# Patient Record
Sex: MALE | Race: WHITE | Employment: OTHER | URBAN - METROPOLITAN AREA
[De-identification: names, ages, dates, MRNs, and addresses within clinical notes are randomized per-mention and may not be internally consistent; named-entity substitution may affect disease eponyms.]

---

## 2017-04-15 ENCOUNTER — HOSPITAL ENCOUNTER (EMERGENCY)
Facility: CLINIC | Age: 67
Discharge: HOME | End: 2017-04-15
Attending: EMERGENCY MEDICINE | Admitting: EMERGENCY MEDICINE
Payer: COMMERCIAL

## 2017-04-15 ENCOUNTER — OFFICE VISIT (OUTPATIENT)
Dept: OPHTHALMOLOGY | Facility: CLINIC | Age: 67
End: 2017-04-15

## 2017-04-15 VITALS
TEMPERATURE: 98.7 F | RESPIRATION RATE: 20 BRPM | DIASTOLIC BLOOD PRESSURE: 107 MMHG | SYSTOLIC BLOOD PRESSURE: 159 MMHG | HEART RATE: 76 BPM | OXYGEN SATURATION: 97 %

## 2017-04-15 DIAGNOSIS — Z96.1 PSEUDOPHAKIA OF BOTH EYES: ICD-10-CM

## 2017-04-15 DIAGNOSIS — Z86.69 H/O RD (RETINAL DETACHMENT): ICD-10-CM

## 2017-04-15 DIAGNOSIS — H43.391 FLOATERS IN VISUAL FIELD, RIGHT: ICD-10-CM

## 2017-04-15 DIAGNOSIS — H43.811 PVD (POSTERIOR VITREOUS DETACHMENT), RIGHT EYE: Primary | ICD-10-CM

## 2017-04-15 PROCEDURE — 99284 EMERGENCY DEPT VISIT MOD MDM: CPT | Mod: 25

## 2017-04-15 PROCEDURE — 76512 OPH US DX B-SCAN: CPT

## 2017-04-15 RX ORDER — DILTIAZEM HYDROCHLORIDE 5 MG/ML
INJECTION INTRAVENOUS
Status: DISCONTINUED
Start: 2017-04-15 | End: 2017-04-15 | Stop reason: HOSPADM

## 2017-04-15 RX ORDER — ASPIRIN 81 MG/1
TABLET, CHEWABLE ORAL
Status: DISCONTINUED
Start: 2017-04-15 | End: 2017-04-15 | Stop reason: HOSPADM

## 2017-04-15 RX ORDER — PROPARACAINE HYDROCHLORIDE 5 MG/ML
SOLUTION/ DROPS OPHTHALMIC
Status: DISCONTINUED
Start: 2017-04-15 | End: 2017-04-15 | Stop reason: HOSPADM

## 2017-04-15 ASSESSMENT — TONOMETRY
OD_IOP_MMHG: 21
OS_IOP_MMHG: 15
IOP_METHOD: TONOPEN

## 2017-04-15 ASSESSMENT — VISUAL ACUITY
OS_PH_CC: 20/25-1
OD_CC: 20/20
METHOD: SNELLEN - LINEAR
OD_CC+: -1
OS: 20/40
CORRECTION_TYPE: GLASSES
OD: 20/50
OS_CC: 20/30
OS_CC+: +1

## 2017-04-15 ASSESSMENT — EXTERNAL EXAM - LEFT EYE: OS_EXAM: NORMAL

## 2017-04-15 ASSESSMENT — ENCOUNTER SYMPTOMS
HEADACHES: 0
EYE PAIN: 0

## 2017-04-15 ASSESSMENT — SLIT LAMP EXAM - LIDS
COMMENTS: NORMAL
COMMENTS: NORMAL

## 2017-04-15 ASSESSMENT — EXTERNAL EXAM - RIGHT EYE: OD_EXAM: NORMAL

## 2017-04-15 NOTE — MR AVS SNAPSHOT
After Visit Summary   4/15/2017    Leodan Yanez    MRN: [id number]           Patient Information     Date Of Birth          1950        Visit Information        Provider Department      4/15/2017 6:36 PM Bakari Mina MD Eye Clinic        Today's Diagnoses     PVD (posterior vitreous detachment), right eye    -  1    Pseudophakia of both eyes        H/O RD (retinal detachment)           Follow-ups after your visit        Follow-up notes from your care team     Return in 2 days (on 4/17/2017) for [facility] clinic 830 am.      Your next 10 appointments already scheduled     May 12, 2017   Procedure with Rose Weston MD   [facility] (--)    [address]  [facility] [address]   [phone]            May 13, 2017 10:15 AM CDT   Post-Op with Rose Weston MD   Eye Clinic ([facility])    [address]  [address]  [address] Clin [address] [place] [address]   [phone]            May 13, 2017 10:30 AM CDT   Post-Op with Edel Ruiz MD   Eye Clinic ([facility])    [address]  [address]  [address] Clin [address]  [facility] [address]   [phone]            May 18, 2017  3:00 PM CDT   Post-Op with Rose Weston MD   Eye Clinic ([facility])    [address]  [address]  [address] Clin [address] [place] [address]   [phone]            May 18, 2017  3:30 PM CDT   Post-Op with John Lindsey MD   Eye Clinic ([facility])    [address]  [address]  [address] Clin [address]  [facility] [address]   [phone]            Jun 16, 2017 10:30 AM CDT   Post-Op with John Lindsey MD   Eye Clinic ([facility])    [address]  [address]  [address] Clin [address] [place] [address]   [phone]              Who to contact     Please call your clinic at [phone] to:    Ask questions about  your health    Make or cancel appointments    Discuss your medicines    Learn about your test results    Speak to your doctor   If you have compliments or concerns about an experience at your clinic, or if you wish to file a complaint, please contact [facility] Patient Relations at [phone] or email us at [email or web address]         Additional Information About Your Visit        MyChart Information     Contixhart is an electronic gateway that provides easy, online access to your medical records. With Contixhart, you can request a clinic appointment, read your test results, renew a prescription or communicate with your care team.     To sign up for [employer] visit the website at [email or web address]   You will be asked to enter the access code listed below, as well as some personal information. Please follow the directions to create your username and password.     Your access code is: [email or web address]  Expires: 2017  5:44 PM     Your access code will  in 90 days. If you need help or a new code, please contact your [facility] or call [phone] for assistance.        Care EveryWhere ID     This is your Care EveryWhere ID. This could be used by other organizations to access your [place] medical records  [id number]         Blood Pressure from Last 3 Encounters:   04/15/17 (!) 159/107   12 139/80    Weight from Last 3 Encounters:   12 (!) 142 kg (313 lb 0.9 oz)              Today, you had the following     No orders found for display         Today's Medication Changes      Notice     This visit is on the same day as an admission, and a visit start time could not be determined. If the visit took place after discharge, manually review the med list with the patient.             Primary Care Provider Office Phone # Fax #    Stevie Krueger [phone]       [facility] [address]  [place]  [place] [address]        Thank you!     Thank you for choosing EYE CLINIC  for your care. Our goal is always to provide you with excellent care. Hearing back from our patients is one way we can continue to improve our services. Please take a few minutes to complete the written survey that you may receive in the mail after your visit with us. Thank you!             Your Updated Medication List - Protect others around you: Learn how to safely use, store and throw away your medicines at www.disposemymeds.org.      Notice     This visit is on the same day as an admission, and a visit start time could not be determined. If the visit took place after discharge, manually review the med list with the patient.

## 2017-04-15 NOTE — ED AVS SNAPSHOT
[facility] Emergency Department    [address] [place] [address]    Phone:  [phone]    Fax:  [phone]                                       Leodan Yanez   MRN: [id number]    Department:  [facility] Emergency Department   Date of Visit:  4/15/2017           Patient Information     Date Of Birth          1950        Your diagnoses for this visit were:     Floaters in visual field, right        You were seen by Ryan Patterson MD.      Follow-up Information     Go to Go see Dr. Mina right now at the [facility], 9th Floor, [address] [place] [address].  His cell phone is [phone] if you need it, he asked me to give it to you..      24 Hour Appointment Hotline       To make an appointment at any [facility], call [phone] ([phone]). If you don't have a family doctor or clinic, we will help you find one. [facility] clinics are conveniently located to serve the needs of you and your family.             Review of your medicines      Our records show that you are taking the medicines listed below. If these are incorrect, please call your family doctor or clinic.        Dose / Directions Last dose taken    acetaminophen 325 MG tablet   Commonly known as:  TYLENOL   Dose:  650 mg   Quantity:  250 tablet        Take 2 tablets by mouth every 6 hours as needed. Pain.   Refills:  0        calcium + D 600-200 MG-UNIT Tabs   Dose:  1 tablet   Generic drug:  calcium carbonate-vitamin D        Take 1 tablet by mouth daily.   Refills:  0        lisinopril 10 MG tablet   Commonly known as:  PRINIVIL/ZESTRIL   Dose:  10 mg   Quantity:  5 tablet        Take 1 tablet by mouth daily.   Refills:  0        metoprolol 25 MG tablet   Commonly known as:  LOPRESSOR   Dose:  25 mg   Quantity:  10 tablet        Take 1 tablet by mouth 2 times daily.   Refills:  0        pantoprazole 40 MG EC  tablet   Commonly known as:  PROTONIX   Dose:  40 mg   Quantity:  60 tablet        Take 1 tablet by mouth 2 times daily.   Refills:  0        sucralfate 1 GM/10ML suspension   Commonly known as:  CARAFATE   Dose:  1 g   Quantity:  420 mL        Take 10 mLs by mouth 2 times daily.   Refills:  0        VITAMIN C PO   Dose:  500 mg        Take 500 mg by mouth.   Refills:  0                Orders Needing Specimen Collection     None      Pending Results     No orders found from 4/13/2017 to 4/16/2017.            Pending Culture Results     No orders found from 4/13/2017 to 4/16/2017.            Test Results From Your Hospital Stay               Clinical Quality Measure: Blood Pressure Screening     Your blood pressure was checked while you were in the emergency department today. The last reading we obtained was  BP: (!) 159/107 . Please read the guidelines below about what these numbers mean and what you should do about them.  If your systolic blood pressure (the top number) is less than 120 and your diastolic blood pressure (the bottom number) is less than 80, then your blood pressure is normal. There is nothing more that you need to do about it.  If your systolic blood pressure (the top number) is 120-139 or your diastolic blood pressure (the bottom number) is 80-89, your blood pressure may be higher than it should be. You should have your blood pressure rechecked within a year by a primary care provider.  If your systolic blood pressure (the top number) is 140 or greater or your diastolic blood pressure (the bottom number) is 90 or greater, you may have high blood pressure. High blood pressure is treatable, but if left untreated over time it can put you at risk for heart attack, stroke, or kidney failure. You should have your blood pressure rechecked by a primary care provider within the next 4 weeks.  If your provider in the emergency department today gave you specific instructions to follow-up with your doctor or  "provider even sooner than that, you should follow that instruction and not wait for up to 4 weeks for your follow-up visit.        Thank you for choosing [facility]       Thank you for choosing [facility] for your care. Our goal is always to provide you with excellent care. Hearing back from our patients is one way we can continue to improve our services. Please take a few minutes to complete the written survey that you may receive in the mail after you visit with us. Thank you!        [employer] Information     Xcalia lets you send messages to your doctor, view your test results, renew your prescriptions, schedule appointments and more. To sign up, go to [email or web address] . Click on \"Log in\" on the left side of the screen, which will take you to the Welcome page. Then click on \"Sign up Now\" on the right side of the page.     You will be asked to enter the access code listed below, as well as some personal information. Please follow the directions to create your username and password.     Your access code is: [email or web address]  Expires: 2017  5:44 PM     Your access code will  in 90 days. If you need help or a new code, please call your [facility] clinic or [phone].        Care EveryWhere ID     This is your Care EveryWhere ID. This could be used by other organizations to access your [facility] medical records  [id number]        After Visit Summary       This is your record. Keep this with you and show to your community pharmacist(s) and doctor(s) at your next visit.                  "

## 2017-04-15 NOTE — ED AVS SNAPSHOT
[facility] Emergency Department    [address]    [facility] [address]    Phone:  [phone]    Fax:  [phone]                                       Leodan Yanez   MRN: [id number]    Department:  [facility] Emergency Department   Date of Visit:  4/15/2017           After Visit Summary Signature Page     I have received my discharge instructions, and my questions have been answered. I have discussed any challenges I see with this plan with the nurse or doctor.    ..........................................................................................................................................  Patient/Patient Representative Signature      ..........................................................................................................................................  Patient Representative Print Name and Relationship to Patient    ..................................................               ................................................  Date                                            Time    ..........................................................................................................................................  Reviewed by Signature/Title    ...................................................              ..............................................  Date                                                            Time

## 2017-04-15 NOTE — ED PROVIDER NOTES
"  History     Chief Complaint:  Blurred Vision    HPI   Leodan Yanez is a 66 year old male with a history of hypertension who presents with blurred vision. The patient reports the onset of blurred vision to his right eye approximately one week ago. He was seen by his Ophthalmologist earlier this week where everything checked out after testing ocular pressure and dilating he eyes. He comes into the ED today with complaint of intermittent blurry vision and intermittent \"hook\" shaped loss of vision to his right eye over the past 48 hours. Upon presentation in the ED, the patient reports that his vision is normal at this time, vision changes are sporadic per his report. The patient reports a history of retinal tear to his left eye 25 years ago, and states that symptoms today are similar. He also reports a history of bilateral cataracts and is status post cataract lens surgery in both eyes. He otherwise reports no further complaints at this time and no recent illness, headache, or eye pain.     Allergies:  NKDA    Medications:    Lisinopril  Metoprolol  Pantoprazole  Sucralfate     Past Medical History:    Hernia  Hypertension  GI bleed    Past Surgical History:    Bypass gastric duodenal switch    Family History:    No past pertinent family history.    Social History:  Marital Status:   [2]     Review of Systems   Eyes: Negative for pain.        Positive for floaters right eye  Positive for blurred vision right eye   Neurological: Negative for headaches.   All other systems reviewed and are negative.    Physical Exam   First Vitals:  BP: (!) 159/107  Pulse: 76  Temp: 98.7  F (37.1  C)  Resp: 20  SpO2: 97 %    Physical Exam  Constitutional: Patient is well appearing. No distress.  Head: Atraumatic.  Mouth/Throat: Oropharynx is clear and moist. No oropharyngeal exudate.  Eyes: Conjunctivae and EOM are normal. No scleral icterus.  Slit lamp/woods lamp: No anterior chamber abnormalities identified. No fluorecin " uptake. Pupil reactive. Bedside US technically difficult with lens replacement, no definite retinal detachment identified. Occular pressure 19.  Neck: Normal range of motion. Neck supple.   Cardiovascular: Normal rate, regular rhythm, normal heart sounds and intact distal pulses.   Pulmonary/Chest: Breath sounds normal. No respiratory distress.  Abdominal: Soft. Bowel sounds are normal. No distension. No tenderness. No rebound or guarding.   Musculoskeletal: Normal range of motion. No edema or tenderness.   Neurological: Alert and orientated to person, place, and time. No observable focal neuro deficit  Skin: Warm and dry. No rash noted. Not diaphoretic.      Emergency Department Course   Interventions:  proparacaine (ALCAINE) 0.5 % ophthalmic solution  fluorescein 1 MG ophthalmic strip     Emergency Department Course:  Nursing notes and vitals reviewed.  I performed an exam of the patient as documented above.     1658 Visual acuity: 20/30 left, 20/50 right.  1705 Pressure: 19 in the right eye.  1713 I performed a bedside US of the right eye, findings above.   1717 I discussed the patient with Dr. Mina, Ophthalmology, who is expecting the patient in clinic this evening.   1721 I reevaluated the patient and provided an update in regards to his ED course.      Findings and plan explained to the Patient. Patient discharged to [facility], Dr. Mina is expecting him in clinic.    Impression & Plan      Medical Decision Making:  Leodan Yanez is a 66 year old male floaters in right eye and intermittent black hook appearance form lateral margin.  Technically difficult US of eye with lens replacement as is fundoscopic exam.  Exam as above concern for vitreous or retinal abnl as pt had similar left eye detachment.  Spoke with ophthalmology and accepted right now in clinic at the [facility].      Diagnosis:  (H43.391) Floaters in visual field, right    Disposition:  Discharged to Opthalmology.        I Mary Arredondo, am serving as a scribe on 4/15/2017 at 4:47 PM to personally document services performed by Dr. Patterson based on my observations and the provider's statements to me.      Mary Arredondo  4/15/2017   [facility] EMERGENCY DEPARTMENT       Ryan Patterson MD  04/15/17 1755

## 2017-04-15 NOTE — ED NOTES
"Patient presents complaining of \"floaters\" in his right eye, along with some blurred vision. He is alert and oriented, ABCs intact.  "

## 2017-04-16 NOTE — PROGRESS NOTES
HPI:  Leodan Yanez is a 66 year old male with history of RD OS 1990s, CE/IOL OU, saw Dr. Mukul Linder on Thursday of this week, complaining of blurry vision in his right eye. That evening he noticed increased blur out of his right eye. Yesterday, noticed new flashes in his right eye, like little balls. He also describes a hook shaped floater. He describes 4-5 episodes of flashes/floater. He denies pain, redness. Endorses tearing. The blur occurs after every blink.    No curtain       POHx: RD OS 1990s s/p buckle, CE/IOL OU  PMHx: HTN  Current Medications:   Current Outpatient Prescriptions on File Prior to Visit:  acetaminophen (TYLENOL) 325 MG tablet Take 2 tablets by mouth every 6 hours as needed. Pain.   lisinopril (PRINIVIL,ZESTRIL) 10 MG tablet Take 1 tablet by mouth daily.   metoprolol (LOPRESSOR) 25 MG tablet Take 1 tablet by mouth 2 times daily.   pantoprazole (PROTONIX) 40 MG enteric coated tablet Take 1 tablet by mouth 2 times daily.   sucralfate (CARAFATE) 1 GM/10ML suspension Take 10 mLs by mouth 2 times daily.   Ascorbic Acid (VITAMIN C PO) Take 500 mg by mouth.   calcium carbonate-vitamin D (CALCIUM + D) 600-200 MG-UNIT TABS Take 1 tablet by mouth daily.     Current Facility-Administered Medications on File Prior to Visit:  [DISCONTINUED] proparacaine (ALCAINE) 0.5 % ophthalmic solution   [DISCONTINUED] fluorescein 1 MG ophthalmic strip   [DISCONTINUED] fluorescein 1 MG ophthalmic strip   [DISCONTINUED] aspirin 81 MG chewable tablet   [DISCONTINUED] diltiazem (CARDIZEM) 25 MG/5ML injection     FHx: no ocular hx  PSHx: bariatric surgery      Current Eye Medications:  Visine    Assessment & Plan:  (H43.811) PVD (posterior vitreous detachment), right eye  (primary encounter diagnosis)  Comment: new on exam  Plan: RD precautions discussed    (Z96.1) Pseudophakia of both eyes  Comment: loose zonules with inferonasal displacement of both lenses  Right eye moves with each blink, likely explaining patients  decreased vision  Plan: may need scleral fixation of 3-piece lens, Monday    (Z86.69) H/O RD (retinal detachment)  Comment: retina appears attached  Plan: monitor            Return in 2 days (on 4/17/2017) for [facility] clinic 830 am.            Bakari Mina MD  PGY2, Dept of Ophthalmology  Pager [phone]      ~~~~~~~~~~~~~~~~~~~~~~~~~~~~~~~~~~   Complete documentation of historical and exam elements from today's encounter can be found in the full encounter summary report (not reduplicated in this progress note).  I personally obtained the chief complaint(s) and history of present illness.  I confirmed and edited as necessary the review of systems, past medical/surgical history, family history, social history, and examination findings as documented by others; and I examined the patient myself.  I personally reviewed the relevant tests, images, and reports as documented above.  I formulated and edited as necessary the assessment and plan and discussed the findings and management plan with the patient and family and I have not examined this patient.  I have reviewed the documentation above and agree with the assessment and plan as stated above and agree with all of its relevant components.    Rose Weston MD  .  Retina Service   Department of Ophthalmology and Visual Neurosciences   [facility]  Phone: [phone]   Fax: [phone]

## 2017-04-17 ENCOUNTER — OFFICE VISIT (OUTPATIENT)
Dept: OPHTHALMOLOGY | Facility: CLINIC | Age: 67
End: 2017-04-17
Attending: OPHTHALMOLOGY
Payer: COMMERCIAL

## 2017-04-17 DIAGNOSIS — T85.22XD DISLOCATED INTRAOCULAR LENS, SUBSEQUENT ENCOUNTER: ICD-10-CM

## 2017-04-17 DIAGNOSIS — Z96.1 PSEUDOPHAKIA OF BOTH EYES: ICD-10-CM

## 2017-04-17 DIAGNOSIS — Z86.69 H/O RD (RETINAL DETACHMENT): ICD-10-CM

## 2017-04-17 DIAGNOSIS — H43.811 PVD (POSTERIOR VITREOUS DETACHMENT), RIGHT EYE: Primary | ICD-10-CM

## 2017-04-17 PROCEDURE — 99213 OFFICE O/P EST LOW 20 MIN: CPT | Mod: ZF

## 2017-04-17 ASSESSMENT — VISUAL ACUITY
OD_CC: 20/20
METHOD: SNELLEN - LINEAR
OD_CC+: -3
OS_CC+: +1
CORRECTION_TYPE: GLASSES
OS_CC: 20/30

## 2017-04-17 ASSESSMENT — REFRACTION_WEARINGRX
OS_ADD: +2.75
OS_CYLINDER: +1.25
OS_SPHERE: -1.50
OD_CYLINDER: +0.75
OD_ADD: +2.75
OD_AXIS: 062
OD_SPHERE: -2.50
OS_AXIS: 082

## 2017-04-17 ASSESSMENT — CONF VISUAL FIELD
OS_NORMAL: 1
OD_NORMAL: 1

## 2017-04-17 ASSESSMENT — TONOMETRY
IOP_METHOD: TONOPEN
OD_IOP_MMHG: 11
OS_IOP_MMHG: 12

## 2017-04-17 ASSESSMENT — SLIT LAMP EXAM - LIDS
COMMENTS: NORMAL
COMMENTS: NORMAL

## 2017-04-17 ASSESSMENT — EXTERNAL EXAM - LEFT EYE: OS_EXAM: NORMAL

## 2017-04-17 ASSESSMENT — EXTERNAL EXAM - RIGHT EYE: OD_EXAM: NORMAL

## 2017-04-17 NOTE — MR AVS SNAPSHOT
After Visit Summary   4/17/2017    Leodan Yanez    MRN: [id number]           Patient Information     Date Of Birth          1950        Visit Information        Provider Department      4/17/2017 8:30 AM Teo Smith MD Eye Clinic        Today's Diagnoses     PVD (posterior vitreous detachment), right eye    -  1    Pseudophakia of both eyes        H/O RD (retinal detachment)        Dislocated intraocular lens, subsequent encounter           Follow-ups after your visit        Follow-up notes from your care team     Return in about 3 days (around 4/20/2017) for dislocated IOL right eye.      Your next 10 appointments already scheduled     Apr 20, 2017  8:15 AM CDT   NEW RETINA with Rose Weston MD   Eye Clinic ([facility])    [facility]  [address]  [address] Clin [address]  [facility] [id number] [phone]              Who to contact     Please call your clinic at [phone] to:    Ask questions about your health    Make or cancel appointments    Discuss your medicines    Learn about your test results    Speak to your doctor   If you have compliments or concerns about an experience at your clinic, or if you wish to file a complaint, please contact [facility] Patient Relations at [phone] or email us at [email or web address]         Additional Information About Your Visit        MyChart Information     6connect is an electronic gateway that provides easy, online access to your medical records. With 6connect, you can request a clinic appointment, read your test results, renew a prescription or communicate with your care team.     To sign up for [employer] visit the website at [email or web address].   You will be asked to enter the access code listed below, as well as some personal information. Please follow the directions to create your username and password.     Your access code is: [email or web address]  Expires:  2017  5:44 PM     Your access code will  in 90 days. If you need help or a new code, please contact your [facility] or call [phone] for assistance.        Care EveryWhere ID     This is your Care EveryWhere ID. This could be used by other organizations to access your [place] medical records  [id number]         Blood Pressure from Last 3 Encounters:   04/15/17 (!) 159/107   12 139/80    Weight from Last 3 Encounters:   12 (!) 142 kg (313 lb 0.9 oz)              Today, you had the following     No orders found for display       Primary Care Provider Office Phone # Fax #    Stevie Krueger [phone]       [facility] [address]  [facility] [address]        Thank you!     Thank you for choosing EYE CLINIC  for your care. Our goal is always to provide you with excellent care. Hearing back from our patients is one way we can continue to improve our services. Please take a few minutes to complete the written survey that you may receive in the mail after your visit with us. Thank you!             Your Updated Medication List - Protect others around you: Learn how to safely use, store and throw away your medicines at www.disposemymeds.org.          This list is accurate as of: 17 10:23 AM.  Always use your most recent med list.                   Brand Name Dispense Instructions for use    acetaminophen 325 MG tablet    TYLENOL    250 tablet    Take 2 tablets by mouth every 6 hours as needed. Pain.       calcium + D 600-200 MG-UNIT Tabs   Generic drug:  calcium carbonate-vitamin D      Take 1 tablet by mouth daily.       lisinopril 10 MG tablet    PRINIVIL/ZESTRIL    5 tablet    Take 1 tablet by mouth daily.       metoprolol 25 MG tablet    LOPRESSOR    10 tablet    Take 1 tablet by mouth 2 times daily.       pantoprazole 40 MG EC tablet    PROTONIX    60 tablet    Take 1 tablet by mouth 2 times daily.        sucralfate 1 GM/10ML suspension    CARAFATE    420 mL    Take 10 mLs by mouth 2 times daily.       VITAMIN C PO      Take 500 mg by mouth.

## 2017-04-17 NOTE — NURSING NOTE
Chief Complaints and History of Present Illnesses   Patient presents with     Follow Up For     PVD RE     HPI    Last Eye Exam:  4/15/17   Affected eye(s):  Right   Symptoms:     Blurred vision   Floaters   Flashes   No redness   Dryness      Duration:  3 days   Frequency:  Constant       Do you have eye pain now?:  No      Comments:  Pt complains of floaters and flashing lights RE, onset 3 days. Denies any pain. Some dryness noted today, notes that he use to use [facility] regular basis until stopped by Dr over past weekend. Notes that [facility] 1993. States that he has been told his IOL RE is loose RE>LE. GIANCARLO TEE, COA 9:07 AM 04/17/2017

## 2017-04-17 NOTE — PROGRESS NOTES
HPI:  Leodan Yanez is a 66 year old male with history of RD OS 1990s, CE/IOL OU. Here for f/u PVD right eye and eval for dislocated IOL.       POHx: RD OS 1990s s/p buckle, CE/IOL OU  PMHx: HTN  Current Medications: FHx: no ocular hx  PSHx: bariatric surgery      Current Eye Medications:  None    Assessment & Plan:  (H43.811) PVD (posterior vitreous detachment), right eye  (primary encounter diagnosis)  Comment: new on 4/15/17. No tears or detachments on 360 SD today.   Plan: Continue to monitor for RD  RD precautions were discussed. Patient instructed to contact clinic immediately if he/she experiences flashes, floaters, shadows/curtains in their peripheral vision, or a sudden change in vision.     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 Weston due to posterior dislocation in supine     (Z86.69) H/O RD repair with SB left eye at [facility] 1993 (retinal detachment)  Comment: retina appears attached  Plan: monitor    Follow up with Dr. Weston on Thursday due to quickly changing vision right eye with concern for IOL possibly dropping to back of the eye soon     Teo Smith MD MPH   Ophthalmology Resident PGY-3    Teaching statement:  Complete documentation of historical and exam elements from today's encounter can be found in the full encounter summary report (not reduplicated in this progress note). I personally obtained the chief complaint(s) and history of present illness.  I confirmed and edited as necessary the review of systems, past medical/surgical history, family history, social history, and examination findings as documented by others; and I examined the patient myself. I personally reviewed the relevant tests, images, and reports as documented above.     I formulated and edited as necessary the assessment and  plan and discussed the findings and management plan with the patient and family.    Tia Almendarez MD  Comprehensive Ophthalmology & Ocular Pathology  Department of Ophthalmology and Visual Neurosciences  [email or web address]  Pager [phone]

## 2017-04-20 ENCOUNTER — OFFICE VISIT (OUTPATIENT)
Dept: OPHTHALMOLOGY | Facility: CLINIC | Age: 67
End: 2017-04-20
Attending: OPHTHALMOLOGY
Payer: COMMERCIAL

## 2017-04-20 DIAGNOSIS — H43.812 VITREOUS DEGENERATION, LEFT: ICD-10-CM

## 2017-04-20 DIAGNOSIS — T85.22XS DISLOCATED INTRAOCULAR LENS, SEQUELA: Primary | ICD-10-CM

## 2017-04-20 DIAGNOSIS — T85.22XA: Primary | ICD-10-CM

## 2017-04-20 PROCEDURE — 92285 EXTERNAL OCULAR PHOTOGRAPHY: CPT | Mod: ZF | Performed by: OPHTHALMOLOGY

## 2017-04-20 PROCEDURE — 76519 ECHO EXAM OF EYE: CPT | Mod: ZF | Performed by: OPHTHALMOLOGY

## 2017-04-20 PROCEDURE — 40000269 ZZH STATISTIC NO CHARGE FACILITY FEE: Mod: ZF

## 2017-04-20 PROCEDURE — 99212 OFFICE O/P EST SF 10 MIN: CPT | Mod: 25,ZF

## 2017-04-20 RX ORDER — FLUOXETINE 20 MG/5ML
SOLUTION ORAL DAILY
COMMUNITY
End: 2017-05-11

## 2017-04-20 RX ORDER — TAMSULOSIN HYDROCHLORIDE 0.4 MG/1
0.4 CAPSULE ORAL DAILY
COMMUNITY

## 2017-04-20 ASSESSMENT — SLIT LAMP EXAM - LIDS
COMMENTS: DERMATOCHALASIS
COMMENTS: NORMAL
COMMENTS: NORMAL
COMMENTS: DERMATOCHALASIS

## 2017-04-20 ASSESSMENT — CONF VISUAL FIELD
OS_NORMAL: 1
OS_NORMAL: 1
OD_NORMAL: 1

## 2017-04-20 ASSESSMENT — TONOMETRY
IOP_METHOD: TONOPEN
OS_IOP_MMHG: 18
OD_IOP_MMHG: 18
IOP_METHOD: TONOPEN
OD_IOP_MMHG: 18
OS_IOP_MMHG: 18

## 2017-04-20 ASSESSMENT — VISUAL ACUITY
METHOD: SNELLEN - LINEAR
OS_CC: 20/30-2
OD_PH_CC: 20/25
OS_CC: 20/30
OS_PH_CC: 20/20
OD_CC: 20/30
CORRECTION_TYPE: GLASSES
METHOD: SNELLEN - LINEAR
OD_CC: 20/30-1
OS_CC+: -2
OD_CC+: -1

## 2017-04-20 ASSESSMENT — EXTERNAL EXAM - RIGHT EYE
OD_EXAM: NORMAL
OD_EXAM: NORMAL

## 2017-04-20 ASSESSMENT — REFRACTION_WEARINGRX
OD_SPHERE: -2.50
OS_ADD: +2.75
OD_AXIS: 062
OD_CYLINDER: +0.75
OS_CYLINDER: +1.25
OS_SPHERE: -1.50
OD_ADD: +2.75
OS_AXIS: 082

## 2017-04-20 ASSESSMENT — EXTERNAL EXAM - LEFT EYE
OS_EXAM: NORMAL
OS_EXAM: NORMAL

## 2017-04-20 NOTE — MR AVS SNAPSHOT
After Visit Summary   2017    Leodan Yanez    MRN: [id number]           Patient Information     Date Of Birth          1950        Visit Information        Provider Department      2017 12:45 PM John Lindsey MD [facility]        Today's Diagnoses     Dislocated intraocular lens, sequela - Both Eyes    -  1       Follow-ups after your visit        Who to contact     Please call your clinic at [phone] to:    Ask questions about your health    Make or cancel appointments    Discuss your medicines    Learn about your test results    Speak to your doctor   If you have compliments or concerns about an experience at your clinic, or if you wish to file a complaint, please contact [facility] Patient Relations at [phone] or email us at [email or web address]         Additional Information About Your Visit        MyChart Information     [employer] is an electronic gateway that provides easy, online access to your medical records. With [employer], you can request a clinic appointment, read your test results, renew a prescription or communicate with your care team.     To sign up for [employer] visit the website at [email or web address]   You will be asked to enter the access code listed below, as well as some personal information. Please follow the directions to create your username and password.     Your access code is: [email or web address]  Expires: 2017  5:44 PM     Your access code will  in 90 days. If you need help or a new code, please contact your [facility] or call [phone] for assistance.        Care EveryWhere ID     This is your Care EveryWhere ID. This could be used by other organizations to access your [place] medical records  [id number]         Blood Pressure from Last 3 Encounters:   04/15/17 (!) 159/107   12 139/80    Weight from Last 3 Encounters:   12 (!) 142 kg (313 lb 0.9  oz)              We Performed the Following     IOL Biometry w/ IOL calc OU (both eye)     Asya-Operative Worksheet (Ant Seg)        Primary Care Provider Office Phone # Fax #    Stevie Krueger [phone]       [facility] [address]  [facility] [address]        Thank you!     Thank you for choosing EYE CLINIC  for your care. Our goal is always to provide you with excellent care. Hearing back from our patients is one way we can continue to improve our services. Please take a few minutes to complete the written survey that you may receive in the mail after your visit with us. Thank you!             Your Updated Medication List - Protect others around you: Learn how to safely use, store and throw away your medicines at www.disposemymeds.org.          This list is accurate as of: 4/20/17 11:59 PM.  Always use your most recent med list.                   Brand Name Dispense Instructions for use    acetaminophen 325 MG tablet    TYLENOL    250 tablet    Take 2 tablets by mouth every 6 hours as needed. Pain.       calcium + D 600-200 MG-UNIT Tabs   Generic drug:  calcium carbonate-vitamin D      Take 1 tablet by mouth daily.       FLUoxetine 20 MG/5ML solution    PROzac     Take by mouth daily       lisinopril 10 MG tablet    PRINIVIL/ZESTRIL    5 tablet    Take 1 tablet by mouth daily.       metoprolol 25 MG tablet    LOPRESSOR    10 tablet    Take 1 tablet by mouth 2 times daily.       pantoprazole 40 MG EC tablet    PROTONIX    60 tablet    Take 1 tablet by mouth 2 times daily.       sucralfate 1 GM/10ML suspension    CARAFATE    420 mL    Take 10 mLs by mouth 2 times daily.       tamsulosin 0.4 MG capsule    FLOMAX     Take by mouth daily       VITAMIN C PO      Take 500 mg by mouth.

## 2017-04-20 NOTE — PROGRESS NOTES
CC:bilaterally dislocated IOL     HPI: Leodan Yanez is a  66 year old year-old patient seen by Dr Mina and Luis for dislocated intraocular lens' both eyes. He  saw Dr. Mukul Linder on Thursday 4-6-17, complaining of blurry vision in his right eye. That evening he noticed increased blur out of his right eye. Also noticing some flashes/floaters.   He has noticed continued blur of the right eye. He feels like it may be slightly worse. No more flashes.      Left eye is slightly blurry as well.     ON FLOMAX x >2 year.       OCULAR HISTORY  Cataract extraction/IOL both eyes 2000 ([place]; [facility])   High myope  S/p Retinal detachment LE With scleral buckle 1993  Dry eyes     Assessment & Plan:    1. 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 Weston.     Patient very light sensitive and squeezing eyes with light. Could consider general anesthesia  Patient on aspirin consider stopping 2 weeks before surgery    2. 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 posterior vitreous detachment right eye   Comment: new on 4/15/17. No tears/detachments seen on exam.     4. H/O RD repair with SB left eye at [facility] 1993 (retinal detachment)  retina appears attached    5. History of sleep apnea. Uses CPAP at night    Julio Puga MD  Ophthalmology resident, PGY-3      Complete documentation of historical and exam elements from today's encounter can be found in the full encounter summary report (not reduplicated in this progress note). I personally obtained the chief complaint(s) and history of present illness.  I confirmed and edited as necessary the review of systems, past medical/surgical history, family history, social history,  and examination findings as documented by others.  I examined the patient myself, and I personally reviewed the relevant tests, images, and reports as documented above. I formulated and edited as necessary the assessment and plan and discussed the findings and management plan with the patient and family.     I personally interpreted the diagnostic / imaging study and have edited the interpretation as needed.    John Lindsey MD, MA  Director, Cornea & Anterior Segment  [facility] Department of Ophthalmology & Visual Neuroscience

## 2017-04-20 NOTE — NURSING NOTE
Chief Complaints and History of Present Illnesses   Patient presents with     New Patient     dislocated IOL ou; combo case with Trista ISAACS    Last Eye Exam:  4/20/17   Affected eye(s):  Both   Symptoms:              Comments:  Pt. States that VA RE>LE has been getting progressively worse over the last week.  No longer seeing any flashes or floaters BE.  Ana Arreguin COT 8:26 AM April 20, 2017

## 2017-04-20 NOTE — NURSING NOTE
Chief Complaints and History of Present Illnesses   Patient presents with     Follow Up For     dislocated IOL right eye     HPI    Affected eye(s):  Right   Symptoms:        Duration:  1 week   Frequency:  Constant       Do you have eye pain now?:  No      Comments:  Pt. States that VA RE>LE has been getting progressively worse over the last week.  No longer seeing any flashes or floaters BE.  Ana Arreguin COT 8:26 AM April 20, 2017

## 2017-04-20 NOTE — MR AVS SNAPSHOT
After Visit Summary   2017    Leodan Yanez    MRN: [id number]           Patient Information     Date Of Birth          1950        Visit Information        Provider Department      2017 8:15 AM Rose Weston MD [facility]        Today's Diagnoses     Dislocation of intraocular lens into vitreous of both eyes    -  1    Vitreous degeneration, left - Right Eye           Follow-ups after your visit        Who to contact     Please call your clinic at [phone] to:    Ask questions about your health    Make or cancel appointments    Discuss your medicines    Learn about your test results    Speak to your doctor   If you have compliments or concerns about an experience at your clinic, or if you wish to file a complaint, please contact [facility] Patient Relations at [phone] or email us at [email or web address]         Additional Information About Your Visit        MyChart Information     [facility] is an electronic gateway that provides easy, online access to your medical records. With [facility], you can request a clinic appointment, read your test results, renew a prescription or communicate with your care team.     To sign up for [employer] visit the website at [email or web address]   You will be asked to enter the access code listed below, as well as some personal information. Please follow the directions to create your username and password.     Your access code is: [email or web address]  Expires: 2017  5:44 PM     Your access code will  in 90 days. If you need help or a new code, please contact your [facility] or call [phone] for assistance.        Care EveryWhere ID     This is your Care EveryWhere ID. This could be used by other organizations to access your [place] medical records  [id number]         Blood Pressure from Last 3 Encounters:   04/15/17 (!) 159/107   12 139/80    Weight  from Last 3 Encounters:   08/08/12 (!) 142 kg (313 lb 0.9 oz)              We Performed the Following     Fundus Photos OU (both eyes)     OCT Retina Spectralis OU (both eyes)     Asya-Operative Worksheet (Retina)     Asya-Operative Worksheet (Retina)     Slit Lamp Photos OU (both eyes)        Primary Care Provider Office Phone # Fax #    Stevie Krueger [phone]       [facility] [address]  [facility] [address]        Thank you!     Thank you for choosing EYE CLINIC  for your care. Our goal is always to provide you with excellent care. Hearing back from our patients is one way we can continue to improve our services. Please take a few minutes to complete the written survey that you may receive in the mail after your visit with us. Thank you!             Your Updated Medication List - Protect others around you: Learn how to safely use, store and throw away your medicines at www.disposemymeds.org.          This list is accurate as of: 4/20/17  5:47 PM.  Always use your most recent med list.                   Brand Name Dispense Instructions for use    acetaminophen 325 MG tablet    TYLENOL    250 tablet    Take 2 tablets by mouth every 6 hours as needed. Pain.       calcium + D 600-200 MG-UNIT Tabs   Generic drug:  calcium carbonate-vitamin D      Take 1 tablet by mouth daily.       FLUoxetine 20 MG/5ML solution    PROzac     Take by mouth daily       lisinopril 10 MG tablet    PRINIVIL/ZESTRIL    5 tablet    Take 1 tablet by mouth daily.       metoprolol 25 MG tablet    LOPRESSOR    10 tablet    Take 1 tablet by mouth 2 times daily.       pantoprazole 40 MG EC tablet    PROTONIX    60 tablet    Take 1 tablet by mouth 2 times daily.       sucralfate 1 GM/10ML suspension    CARAFATE    420 mL    Take 10 mLs by mouth 2 times daily.       tamsulosin 0.4 MG capsule    FLOMAX     Take by mouth daily       VITAMIN C PO      Take 500 mg by mouth.

## 2017-04-20 NOTE — PROGRESS NOTES
CC: blurred visual acuity   HPI: Leodan Yanez is a  66 year old year-old patient seen by Dr Mina and Luis for dislocated intraocular lens' both eyes. He  saw Dr. Mukul Linder on Thursday 4-6-17, complaining of blurry vision in his right eye. That evening he noticed increased blur out of his right eye. The following day noticed new flashes in his right eye, like little balls. He also describes a hook shaped floater. He describes 4-5 episodes of flashes/floater. He denies pain, redness. Endorses tearing. The blur occurs after every blink.    OCULAR HISTORY  Cataract extraction/IOL both eyes   High myope  S/p Retinal detachment  With scleral buckle 1993    Retinal Imaging:     Optos image: consistent with exam   OCT  4.20.17  RE: small drusen and Retinal pigment epithelium changes   LE: small drusen and Retinal pigment epithelium changes     intraocular lens calcs: 4.20.17  Right eye: 32.77  Left eye: 34.18    Assessment & Plan:    1. 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 Lindsey  - Patient very light sensitive and squeezing eyes with light.  - Patient on aspirin consider stopping 2 weeks before surgery    2. 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 posterior vitreous detachment right eye   new on 4/15/17. No tears or detachments on 360 SD today.   RD precautions were discussed. Patient instructed to contact clinic immediately if he/she experiences flashes, floaters, shadows/curtains in their peripheral vision, or a sudden change in vision.     4. History of RD repair with SB left eye at [facility] 1993 (retinal detachment)  retina  attached    5. History of sleep apnea. Uses CPAP at  night       ~~~~~~~~~~~~~~~~~~~~~~~~~~~~~~~~~~  Complete documentation of historical and exam elements from today's encounter can be found in the full encounter summary report (not redupilcated in this progress note).  I personally obtained the chief complaint(s) and hisotry of present illness., I have confirmed and edited as necessary the Past medical history/Past Surgical History, Social history, Family medical history,  Review of systems, and exam/neuro findings as obtained by the technician or others. I have examined this patient myself. , I personally viewed the relevant tests, image(s), reports, and studies listed above and the documentation reflects my findings and interpretation. and I formulated and edited as necessary the assessment and plan and discussed the findings and management plan with the patient and family.    Rose Weston MD  .  Retina Service   Department of Ophthalmology and Visual Neurosciences   [facility]  Phone: [phone]   Fax: [phone]

## 2017-05-08 ENCOUNTER — TRANSFERRED RECORDS (OUTPATIENT)
Dept: HEALTH INFORMATION MANAGEMENT | Facility: CLINIC | Age: 67
End: 2017-05-08

## 2017-05-11 RX ORDER — LISINOPRIL AND HYDROCHLOROTHIAZIDE 12.5; 2 MG/1; MG/1
1 TABLET ORAL 2 TIMES DAILY
COMMUNITY

## 2017-05-11 RX ORDER — METOPROLOL SUCCINATE 200 MG/1
200 TABLET, EXTENDED RELEASE ORAL DAILY
COMMUNITY

## 2017-05-12 ENCOUNTER — ANESTHESIA EVENT (OUTPATIENT)
Dept: SURGERY | Facility: CLINIC | Age: 67
End: 2017-05-12
Payer: COMMERCIAL

## 2017-05-12 ENCOUNTER — SURGERY (OUTPATIENT)
Age: 67
End: 2017-05-12

## 2017-05-12 ENCOUNTER — ANESTHESIA (OUTPATIENT)
Dept: SURGERY | Facility: CLINIC | Age: 67
End: 2017-05-12
Payer: COMMERCIAL

## 2017-05-12 ENCOUNTER — HOSPITAL ENCOUNTER (OUTPATIENT)
Facility: CLINIC | Age: 67
Discharge: HOME | End: 2017-05-12
Attending: OPHTHALMOLOGY | Admitting: OPHTHALMOLOGY
Payer: COMMERCIAL

## 2017-05-12 VITALS
BODY MASS INDEX: 41.75 KG/M2 | OXYGEN SATURATION: 95 % | WEIGHT: 315 LBS | TEMPERATURE: 98.2 F | HEIGHT: 73 IN | SYSTOLIC BLOOD PRESSURE: 111 MMHG | DIASTOLIC BLOOD PRESSURE: 77 MMHG | RESPIRATION RATE: 18 BRPM

## 2017-05-12 DIAGNOSIS — T85.22XS DISLOCATED INTRAOCULAR LENS, SEQUELA: Primary | ICD-10-CM

## 2017-05-12 PROCEDURE — 25000125 ZZHC RX 250: Performed by: NURSE ANESTHETIST, CERTIFIED REGISTERED

## 2017-05-12 PROCEDURE — 25800025 ZZH RX 258: Performed by: ANESTHESIOLOGY

## 2017-05-12 PROCEDURE — 36000104 ZZH EYE SURGERY LEVEL 4 1ST 30 MIN: Performed by: OPHTHALMOLOGY

## 2017-05-12 PROCEDURE — 25000132 ZZH RX MED GY IP 250 OP 250 PS 637: Performed by: OPHTHALMOLOGY

## 2017-05-12 PROCEDURE — V2632 POST CHMBR INTRAOCULAR LENS: HCPCS | Performed by: OPHTHALMOLOGY

## 2017-05-12 PROCEDURE — 25000125 ZZHC RX 250: Performed by: OPHTHALMOLOGY

## 2017-05-12 PROCEDURE — 25000125 ZZHC RX 250: Performed by: ANESTHESIOLOGY

## 2017-05-12 PROCEDURE — 25800025 ZZH RX 258: Performed by: OPHTHALMOLOGY

## 2017-05-12 PROCEDURE — 27210995 ZZH RX 272: Performed by: OPHTHALMOLOGY

## 2017-05-12 PROCEDURE — 27210794 ZZH OR GENERAL SUPPLY STERILE: Performed by: OPHTHALMOLOGY

## 2017-05-12 PROCEDURE — 25000128 H RX IP 250 OP 636: Performed by: NURSE ANESTHETIST, CERTIFIED REGISTERED

## 2017-05-12 PROCEDURE — 71000028 ZZH EYE RECOVERY PHASE 2 EACH 15 MINS: Performed by: OPHTHALMOLOGY

## 2017-05-12 PROCEDURE — 37000008 ZZH ANESTHESIA TECHNICAL FEE, 1ST 30 MIN: Performed by: OPHTHALMOLOGY

## 2017-05-12 PROCEDURE — 40000170 ZZH STATISTIC PRE-PROCEDURE ASSESSMENT II: Performed by: OPHTHALMOLOGY

## 2017-05-12 PROCEDURE — 36000105 ZZH EYE SURGERY LEVEL 4 EA 15 ADDTL MIN: Performed by: OPHTHALMOLOGY

## 2017-05-12 PROCEDURE — 37000009 ZZH ANESTHESIA TECHNICAL FEE, EACH ADDTL 15 MIN: Performed by: OPHTHALMOLOGY

## 2017-05-12 PROCEDURE — 27211024 ZZHC OR SUPPLY OTHER OPNP: Performed by: OPHTHALMOLOGY

## 2017-05-12 PROCEDURE — 25000128 H RX IP 250 OP 636: Performed by: OPHTHALMOLOGY

## 2017-05-12 RX ORDER — NEOMYCIN SULFATE, POLYMYXIN B SULFATE, AND DEXAMETHASONE 3.5; 10000; 1 MG/G; [USP'U]/G; MG/G
OINTMENT OPHTHALMIC PRN
Status: DISCONTINUED | OUTPATIENT
Start: 2017-05-12 | End: 2017-05-12 | Stop reason: HOSPADM

## 2017-05-12 RX ORDER — PHENYLEPHRINE HYDROCHLORIDE 25 MG/ML
1 SOLUTION/ DROPS OPHTHALMIC
Status: COMPLETED | OUTPATIENT
Start: 2017-05-12 | End: 2017-05-12

## 2017-05-12 RX ORDER — ONDANSETRON 2 MG/ML
INJECTION INTRAMUSCULAR; INTRAVENOUS PRN
Status: DISCONTINUED | OUTPATIENT
Start: 2017-05-12 | End: 2017-05-12

## 2017-05-12 RX ORDER — SODIUM CHLORIDE, SODIUM LACTATE, POTASSIUM CHLORIDE, CALCIUM CHLORIDE 600; 310; 30; 20 MG/100ML; MG/100ML; MG/100ML; MG/100ML
INJECTION, SOLUTION INTRAVENOUS CONTINUOUS
Status: DISCONTINUED | OUTPATIENT
Start: 2017-05-12 | End: 2017-05-12 | Stop reason: HOSPADM

## 2017-05-12 RX ORDER — DEXAMETHASONE SODIUM PHOSPHATE 4 MG/ML
INJECTION, SOLUTION INTRA-ARTICULAR; INTRALESIONAL; INTRAMUSCULAR; INTRAVENOUS; SOFT TISSUE PRN
Status: DISCONTINUED | OUTPATIENT
Start: 2017-05-12 | End: 2017-05-12 | Stop reason: HOSPADM

## 2017-05-12 RX ORDER — BALANCED SALT SOLUTION 6.4; .75; .48; .3; 3.9; 1.7 MG/ML; MG/ML; MG/ML; MG/ML; MG/ML; MG/ML
SOLUTION OPHTHALMIC PRN
Status: DISCONTINUED | OUTPATIENT
Start: 2017-05-12 | End: 2017-05-12 | Stop reason: HOSPADM

## 2017-05-12 RX ORDER — FENTANYL CITRATE 50 UG/ML
INJECTION, SOLUTION INTRAMUSCULAR; INTRAVENOUS PRN
Status: DISCONTINUED | OUTPATIENT
Start: 2017-05-12 | End: 2017-05-12

## 2017-05-12 RX ORDER — CYCLOPENTOLATE HYDROCHLORIDE 10 MG/ML
1 SOLUTION/ DROPS OPHTHALMIC
Status: COMPLETED | OUTPATIENT
Start: 2017-05-12 | End: 2017-05-12

## 2017-05-12 RX ORDER — PREDNISOLONE ACETATE 10 MG/ML
1 SUSPENSION/ DROPS OPHTHALMIC 4 TIMES DAILY
Qty: 1 BOTTLE | Refills: 0 | Status: ON HOLD | OUTPATIENT
Start: 2017-05-12 | End: 2017-08-14

## 2017-05-12 RX ORDER — PROPARACAINE HYDROCHLORIDE 5 MG/ML
1 SOLUTION/ DROPS OPHTHALMIC ONCE
Status: COMPLETED | OUTPATIENT
Start: 2017-05-12 | End: 2017-05-12

## 2017-05-12 RX ORDER — PROPOFOL 10 MG/ML
INJECTION, EMULSION INTRAVENOUS PRN
Status: DISCONTINUED | OUTPATIENT
Start: 2017-05-12 | End: 2017-05-12

## 2017-05-12 RX ORDER — TROPICAMIDE 10 MG/ML
1 SOLUTION/ DROPS OPHTHALMIC
Status: COMPLETED | OUTPATIENT
Start: 2017-05-12 | End: 2017-05-12

## 2017-05-12 RX ORDER — OFLOXACIN 3 MG/ML
1 SOLUTION/ DROPS OPHTHALMIC 4 TIMES DAILY
Qty: 1 BOTTLE | Refills: 0 | Status: ON HOLD | OUTPATIENT
Start: 2017-05-12 | End: 2017-08-14

## 2017-05-12 RX ORDER — DICLOFENAC SODIUM 1 MG/ML
1 SOLUTION/ DROPS OPHTHALMIC
Status: COMPLETED | OUTPATIENT
Start: 2017-05-12 | End: 2017-05-12

## 2017-05-12 RX ADMIN — DEXMEDETOMIDINE HYDROCHLORIDE 12 MCG: 100 INJECTION, SOLUTION INTRAVENOUS at 10:19

## 2017-05-12 RX ADMIN — PROPOFOL 20 MG: 10 INJECTION, EMULSION INTRAVENOUS at 10:24

## 2017-05-12 RX ADMIN — ACETYLCHOLINE CHLORIDE 5 MG: KIT at 11:45

## 2017-05-12 RX ADMIN — Medication 0.5 ML: at 11:09

## 2017-05-12 RX ADMIN — CYCLOPENTOLATE HYDROCHLORIDE 1 DROP: 10 SOLUTION/ DROPS OPHTHALMIC at 09:45

## 2017-05-12 RX ADMIN — TROPICAMIDE 1 DROP: 10 SOLUTION/ DROPS OPHTHALMIC at 09:37

## 2017-05-12 RX ADMIN — FENTANYL CITRATE 25 MCG: 50 INJECTION, SOLUTION INTRAMUSCULAR; INTRAVENOUS at 11:44

## 2017-05-12 RX ADMIN — DICLOFENAC SODIUM 1 DROP: 1 SOLUTION/ DROPS OPHTHALMIC at 09:46

## 2017-05-12 RX ADMIN — LIDOCAINE HYDROCHLORIDE 1 ML: 10 INJECTION, SOLUTION EPIDURAL; INFILTRATION; INTRACAUDAL; PERINEURAL at 09:54

## 2017-05-12 RX ADMIN — DICLOFENAC SODIUM 1 DROP: 1 SOLUTION/ DROPS OPHTHALMIC at 09:37

## 2017-05-12 RX ADMIN — SODIUM CHLORIDE, POTASSIUM CHLORIDE, SODIUM LACTATE AND CALCIUM CHLORIDE: 600; 310; 30; 20 INJECTION, SOLUTION INTRAVENOUS at 09:54

## 2017-05-12 RX ADMIN — BALANCED SALT SOLUTION 15 ML: 6.4; .75; .48; .3; 3.9; 1.7 SOLUTION OPHTHALMIC at 10:50

## 2017-05-12 RX ADMIN — NEOMYCIN SULFATE, POLYMYXIN B SULFATE, AND DEXAMETHASONE 1 G: 3.5; 10000; 1 OINTMENT OPHTHALMIC at 12:10

## 2017-05-12 RX ADMIN — PROPOFOL 30 MG: 10 INJECTION, EMULSION INTRAVENOUS at 10:20

## 2017-05-12 RX ADMIN — PROPARACAINE HYDROCHLORIDE 1 DROP: 5 SOLUTION/ DROPS OPHTHALMIC at 09:37

## 2017-05-12 RX ADMIN — WATER 0.5 ML: 1 INJECTION INTRAMUSCULAR; INTRAVENOUS; SUBCUTANEOUS at 12:09

## 2017-05-12 RX ADMIN — PHENYLEPHRINE HYDROCHLORIDE 1 DROP: 2.5 SOLUTION/ DROPS OPHTHALMIC at 09:53

## 2017-05-12 RX ADMIN — PROPOFOL 20 MG: 10 INJECTION, EMULSION INTRAVENOUS at 10:21

## 2017-05-12 RX ADMIN — DEXAMETHASONE SODIUM PHOSPHATE 2 MG: 4 INJECTION, SOLUTION INTRA-ARTICULAR; INTRALESIONAL; INTRAMUSCULAR; INTRAVENOUS; SOFT TISSUE at 12:10

## 2017-05-12 RX ADMIN — FENTANYL CITRATE 25 MCG: 50 INJECTION, SOLUTION INTRAMUSCULAR; INTRAVENOUS at 10:42

## 2017-05-12 RX ADMIN — CYCLOPENTOLATE HYDROCHLORIDE 1 DROP: 10 SOLUTION/ DROPS OPHTHALMIC at 09:37

## 2017-05-12 RX ADMIN — EPINEPHRINE 5002 ML: 1 INJECTION, SOLUTION, CONCENTRATE INTRAVENOUS at 10:51

## 2017-05-12 RX ADMIN — DEXMEDETOMIDINE HYDROCHLORIDE 8 MCG: 100 INJECTION, SOLUTION INTRAVENOUS at 10:21

## 2017-05-12 RX ADMIN — PHENYLEPHRINE HYDROCHLORIDE 1 DROP: 2.5 SOLUTION/ DROPS OPHTHALMIC at 09:37

## 2017-05-12 RX ADMIN — FENTANYL CITRATE 25 MCG: 50 INJECTION, SOLUTION INTRAMUSCULAR; INTRAVENOUS at 10:55

## 2017-05-12 RX ADMIN — SODIUM CHLORIDE, POTASSIUM CHLORIDE, SODIUM LACTATE AND CALCIUM CHLORIDE: 600; 310; 30; 20 INJECTION, SOLUTION INTRAVENOUS at 10:00

## 2017-05-12 RX ADMIN — FENTANYL CITRATE 25 MCG: 50 INJECTION, SOLUTION INTRAMUSCULAR; INTRAVENOUS at 12:01

## 2017-05-12 RX ADMIN — TROPICAMIDE 1 DROP: 10 SOLUTION/ DROPS OPHTHALMIC at 09:46

## 2017-05-12 RX ADMIN — CYCLOPENTOLATE HYDROCHLORIDE 1 DROP: 10 SOLUTION/ DROPS OPHTHALMIC at 09:53

## 2017-05-12 RX ADMIN — TROPICAMIDE 1 DROP: 10 SOLUTION/ DROPS OPHTHALMIC at 09:53

## 2017-05-12 RX ADMIN — PHENYLEPHRINE HYDROCHLORIDE 1 DROP: 2.5 SOLUTION/ DROPS OPHTHALMIC at 09:45

## 2017-05-12 RX ADMIN — Medication 5.5 MG: at 10:51

## 2017-05-12 RX ADMIN — ONDANSETRON 4 MG: 2 INJECTION INTRAMUSCULAR; INTRAVENOUS at 10:30

## 2017-05-12 RX ADMIN — PROPOFOL 20 MG: 10 INJECTION, EMULSION INTRAVENOUS at 10:22

## 2017-05-12 RX ADMIN — DICLOFENAC SODIUM 1 DROP: 1 SOLUTION/ DROPS OPHTHALMIC at 09:53

## 2017-05-12 RX ADMIN — PROPOFOL 10 MG: 10 INJECTION, EMULSION INTRAVENOUS at 10:23

## 2017-05-12 RX ADMIN — BALANCED SALT SOLUTION 0.5 ML: 6.4; .75; .48; .3; 3.9; 1.7 SOLUTION OPHTHALMIC at 10:51

## 2017-05-12 ASSESSMENT — COPD QUESTIONNAIRES: COPD: 0

## 2017-05-12 ASSESSMENT — LIFESTYLE VARIABLES: TOBACCO_USE: 0

## 2017-05-12 ASSESSMENT — ENCOUNTER SYMPTOMS
SEIZURES: 0
DYSRHYTHMIAS: 0

## 2017-05-12 NOTE — IP AVS SNAPSHOT
MRN:[id number]                      After Visit Summary   5/12/2017    Leodan Yanez    MRN: [id number]           Thank you!     Thank you for choosing [facility] for your care. Our goal is always to provide you with excellent care. Hearing back from our patients is one way we can continue to improve our services. Please take a few minutes to complete the written survey that you may receive in the mail after you visit with us. Thank you!        Patient Information     Date Of Birth          1950        About your hospital stay     You were admitted on:  May 12, 2017 You last received care in the:  [facility]    You were discharged on:  May 12, 2017       Who to Call     For medical emergencies, please call 911.  For non-urgent questions about your medical care, please call your primary care provider or clinic, [phone]  For questions related to your surgery, please call your surgery clinic        Attending Provider     Provider Rose Dixon MD Ophthalmology       Primary Care Provider Office Phone # Fax #    Stevie Krueger [phone]       [facility] [address] [facility] [address]        Your next 10 appointments already scheduled     May 13, 2017 10:15 AM CDT   Post-Op with Rose Weston MD   Eye Clinic ([facility])    [address]res [address]  [address]   [address] Clin [address] [place] [id number]   [phone]            May 13, 2017 10:30 AM CDT   Post-Op with Edel Ruiz MD   Eye Clinic ([facility])    Andres Nairg  [address]   [address] Clin [address] [place] [id number]   [phone]            May 18, 2017  3:00 PM CDT   Post-Op with Rose Weston MD   Eye Clinic ([facility])    Andres Nairg  [address]   [address] Clin [address] [place] [address]   [phone]            May 18, 2017  3:30 PM CDT   Post-Op  with John Lindsey MD   Eye Clinic ([facility])    [address]  [address]  9[address] Clin [address]  [facility] [address]   [phone]            Jun 16, 2017 10:30 AM CDT   Post-Op with John Lindsey MD   Eye Clinic ([facility])    [address]  [address]  9[address] Clin [address]a  [facility] [address]   [phone]              Further instructions from your care team       [facility] Anesthesia [facility] Care Center Discharge  Instructions  Anesthesia ([facility] [place])   Adult Discharge Instructions    For 24 hours after surgery    1. Get plenty of rest.  Make arrangements to have a responsible adult stay with you for at least 6 hours after you leave the hospital.  2. Do not drive or use heavy equipment for 24 hours.    3. Do not drink alcohol for 24 hours.  4. Do not sign legal documents or make important decisions for 24 hours.  5. Avoid strenuous or risky activities. You may feel lightheaded.  If so, sit for a few minutes before standing.  Have someone help you get up.   6. Conscious sedation patients may resume a regular diet..  7. Any questions of medical nature, call your physician.      **Because you had anesthesia today and your history of sleep apnea, it is extremely important that you use your CPAP machine for the next 24 hours while napping or sleeping.**    [facility]  Discharge Instruction    EyeSurgery [facility]    MD Rose Ford MD Joseph Terry, MD  Will I have pain?  Some discomfort is normal and expected following surgery. The first few days after surgery you may need to use prescription pain pills. Taking Tylenol (acetaminophen) regularly may help prevent pain.  Discomfort should gradually decrease and Tylenol should be sufficient to relieve pain. A foreign body sensation in the cornea of the eye is very common and caused by sutures placed at surgery. These sutures will go away in one  to two weeks. If the pain worsens, you should call the doctor.  Do I need to wear an eye patch?  You do not need to wear an eye patch at home after the doctor has removed the patch on your first day after surgery. However, you may be more comfortable wearing a patch outside in the sun, when sleeping or napping, or in a timi, windy environment.  How much drainage should I have?  You may expect a moderate amount of drainage for a week. Gradually the drainage should decrease. The lids can be cleaned with a clean washcloth and gentle soap or diluted baby shampoo. Wipe the eyelids gently from the nose outward. Some blood in the tears is a normal finding.  Will there be swelling?  Some swelling is normal for about a week or two after which it will gradually decrease. Applying a cool compress, using a clean washcloth for 5 - 10 minutes several times a day may reduce the swelling and make you more comfortable. People may have some swelling of both eyes, especially if face down positioning is required. The white part of the eye may appear very red or bloody for a week or two. This may get worse a few days after surgery. Though the bright red appearance can look frightening, it is a normal finding early after surgery and will resolve in a few weeks.  Will I need to use eye drops?  You will be using several different kinds of eye drops or ointment (salve) when you leave the hospital. The directions will be on each bottle or tube. The medication with the red top will keep your eye dilated and may make your eye more sensitive to light. Wearing sunglasses may help. The other medication is a combination antibiotic/steroid to prevent infection and promote healing.  Occasionally a third drop is used to control the pressure in your eye. A new bottle of artificial tears or lubricant ointment may be used along with your prescription eye drops after surgery. You will be using drops from four to eight weeks. Bring all eye medications  (drops. ointments, or pills) with you  to each visit.   Always wash your hands before putting in the eye drops. You may wish to have someone else help you. Pull down on the lower lid and squeeze one drop from the bottle being careful not to touch the dropper to your eye or eyelid. One drop is sufficient, but another may be used if the first did not go into the eye. It is often easier to put in the drops if you are reclining or lying down. Wait five (5) minutes after the first drop before using the second drop to allow the medications to absorb into the eye.  How long will it take for my vision to improve?  Your vision should gradually improve, but it may take up to six months to regain your best vision. Frequently, air or gas bubbles are injected into the eye at the time of surgery. This will blur your vision significantly at first. As the bubble becomes smaller it will cause a black line in your vision that moves as you move your head. As the bubble becomes smaller you may notice that it looks more like a bubble or that it will break up into several smaller bubbles. It will take from a few days to a few weeks for the bubble to dissolve and be replaced by clear fluid.  You may notice floaters or double vision after your surgery. These symptoms usually will decrease with time. If the double vision is bothersome patching the eye may help.  If you notice a sudden worsening in your vision call your doctor.    Heavy lifting (greater than 50 pounds), swimming and contact sports should be avoided for about 3 to 4 weeks after surgery.  You may resume your usual sexual activities about one week after surgery.  When may I return to work or my normal activities?  Depending on the type or work, you may return to work within a few days. If your work involves physical activity or driving, you will need to restrict your activities and remain home longer.  You may watch television, look at magazines, or work puzzles. Reading may be  uncomfortable for several days, but using the eyes will not cause any damage.  You may go outside as usual. If conditions are windy or timi, wear an eye pad to avoid getting dust or dirt in the eye.      Are there any driving restrictions?  Someone will need to drive you home from the hospital. Generally driving can be resumed in several days if you have good vision in your other eye. If you do not feel comfortable driving, do not drive! Your depth perception will be decreased so you will want to try driving during the day in light traffic until you feel comfortable driving. You should restrict your driving while you are taking prescription pain pills as they also can affect your judgment.  When can I shower and wash my hair?  You may shower or bathe when you get home, but avoid getting water in your eye. You may want someone to help you shampoo your hair at first.  You may shave, brush your teeth, or comb your hair. Do not use make-up, mascara, or creams/lotions around your eye for several weeks.  When will I see the doctor again?  Generally, you will be seen the first day after surgery and again 1-2 weeks later. If you have not received a return appointment before leaving the hospital, you should call our office during the business hours to arrange an appointment. If you will be seeing your local doctor instead of us, you will need to call that office to set up an appointment.  How do I reach a doctor if I have concerns?  One of our doctors is available by calling [facility] [phone]. Please try to call for routine questions and prescription refills during business hours.  You should call your doctor if:  You notice a sudden decrease in your vision.  Have severe pain or pain increases rather than subsiding.    You notice a new black curtain over your eye that is not the gas bubble. If you have any of these symptoms, you may need to be examined.        2/14/14    Dr. Lindsey   [place]  "[facility]          Please call [phone] if you have any questions for Dr Lindsey      Pending Results     No orders found from 5/10/2017 to 2017.            Admission Information     Date & Time Provider Department Dept. Phone    2017 Rose Weston MD [facility] [phone]      Your Vitals Were     Blood Pressure Temperature Respirations Height Weight Pulse Oximetry    123/86 98.2  F (36.8  C) (Temporal) 16 1.854 m (6' 1\") 143.8 kg (317 lb) 98%    BMI (Body Mass Index)                   41.82 kg/m2           MyChart Information     [facility] lets you send messages to your doctor, view your test results, renew your prescriptions, schedule appointments and more. To sign up, go to [email or web address] . Click on \"Log in\" on the left side of the screen, which will take you to the Welcome page. Then click on \"Sign up Now\" on the right side of the page.     You will be asked to enter the access code listed below, as well as some personal information. Please follow the directions to create your username and password.     Your access code is: [email or web address]  Expires: 2017  5:44 PM     Your access code will  in 90 days. If you need help or a new code, please call your [facility] clinic or [phone].        Care EveryWhere ID     This is your Care EveryWhere ID. This could be used by other organizations to access your [facility] medical records  [id number]           Review of your medicines      UNREVIEWED medicines. Ask your doctor about these medicines        Dose / Directions    acetaminophen 325 MG tablet   Commonly known as:  TYLENOL   Used for:  Upper GI bleed        Dose:  650 mg   Take 2 tablets by mouth every 6 hours as needed. Pain.   Quantity:  250 tablet   Refills:  0       calcium + D 600-200 MG-UNIT Tabs   Generic drug:  calcium carbonate-vitamin D        Dose:  1 tablet   Take 1 tablet by mouth daily.   Refills:  0       CELEBREX PO        Dose:  200 mg "   Take 200 mg by mouth daily   Refills:  0       FLUOXETINE HCL PO        Dose:  60 mg   Take 60 mg by mouth daily   Refills:  0       lisinopril-hydrochlorothiazide 20-12.5 MG per tablet   Commonly known as:  PRINZIDE/ZESTORETIC        Dose:  1 tablet   Take 1 tablet by mouth 2 times daily   Refills:  0       metoprolol 200 MG 24 hr tablet   Commonly known as:  TOPROL-XL        Dose:  200 mg   Take 200 mg by mouth daily   Refills:  0       NIFEDIPINE PO        Dose:  90 mg   Take 90 mg by mouth daily   Refills:  0       pantoprazole 40 MG EC tablet   Commonly known as:  PROTONIX   Used for:  Upper GI bleed        Dose:  40 mg   Take 1 tablet by mouth 2 times daily.   Quantity:  60 tablet   Refills:  0       tamsulosin 0.4 MG capsule   Commonly known as:  FLOMAX        Take by mouth daily   Refills:  0       VITAMIN B-12 PO        Refills:  0       VITAMIN C PO        Dose:  500 mg   Take 500 mg by mouth.   Refills:  0       ZOLPIDEM TARTRATE PO        Dose:  10 mg   Take 10 mg by mouth At Bedtime   Refills:  0         START taking        Dose / Directions    ofloxacin 0.3 % ophthalmic solution   Commonly known as:  OCUFLOX   Used for:  Dislocated intraocular lens, sequela        Dose:  1 drop   Place 1 drop into the right eye 4 times daily   Quantity:  1 Bottle   Refills:  0       prednisoLONE acetate 1 % ophthalmic susp   Commonly known as:  PRED FORTE   Used for:  Dislocated intraocular lens, sequela        Dose:  1 drop   Place 1 drop into the right eye 4 times daily   Quantity:  1 Bottle   Refills:  0            Where to get your medicines      These medications were sent to [facility] [place], [place] - [address]  [address] [facility] [address], [place] [address]     Phone:  [phone]     ofloxacin 0.3 % ophthalmic solution    prednisoLONE acetate 1 % ophthalmic susp                Protect others around you: Learn how to safely use, store and throw away your medicines at  www.disposemymeds.org.             Medication List: This is a list of all your medications and when to take them. Check marks below indicate your daily home schedule. Keep this list as a reference.      Medications           Morning Afternoon Evening Bedtime As Needed    acetaminophen 325 MG tablet   Commonly known as:  TYLENOL   Take 2 tablets by mouth every 6 hours as needed. Pain.                                calcium + D 600-200 MG-UNIT Tabs   Take 1 tablet by mouth daily.   Generic drug:  calcium carbonate-vitamin D                                CELEBREX PO   Take 200 mg by mouth daily                                FLUOXETINE HCL PO   Take 60 mg by mouth daily                                lisinopril-hydrochlorothiazide 20-12.5 MG per tablet   Commonly known as:  PRINZIDE/ZESTORETIC   Take 1 tablet by mouth 2 times daily                                metoprolol 200 MG 24 hr tablet   Commonly known as:  TOPROL-XL   Take 200 mg by mouth daily                                NIFEDIPINE PO   Take 90 mg by mouth daily                                ofloxacin 0.3 % ophthalmic solution   Commonly known as:  OCUFLOX   Place 1 drop into the right eye 4 times daily                                pantoprazole 40 MG EC tablet   Commonly known as:  PROTONIX   Take 1 tablet by mouth 2 times daily.                                prednisoLONE acetate 1 % ophthalmic susp   Commonly known as:  PRED FORTE   Place 1 drop into the right eye 4 times daily                                tamsulosin 0.4 MG capsule   Commonly known as:  FLOMAX   Take by mouth daily                                VITAMIN B-12 PO                                VITAMIN C PO   Take 500 mg by mouth.                                ZOLPIDEM TARTRATE PO   Take 10 mg by mouth At Bedtime

## 2017-05-12 NOTE — DISCHARGE INSTRUCTIONS
[facility] Anesthesia Eye Care Center Discharge  Instructions  Anesthesia (Eye Care Center)   Adult Discharge Instructions    For 24 hours after surgery    1. Get plenty of rest.  Make arrangements to have a responsible adult stay with you for at least 6 hours after you leave the hospital.  2. Do not drive or use heavy equipment for 24 hours.    3. Do not drink alcohol for 24 hours.  4. Do not sign legal documents or make important decisions for 24 hours.  5. Avoid strenuous or risky activities. You may feel lightheaded.  If so, sit for a few minutes before standing.  Have someone help you get up.   6. Conscious sedation patients may resume a regular diet..  7. Any questions of medical nature, call your physician.      **Because you had anesthesia today and your history of sleep apnea, it is extremely important that you use your CPAP machine for the next 24 hours while napping or sleeping.**    [facility]  Discharge Instruction    EyeSurgery [facility]    MD Rose Ford MD Joseph Terry, MD  Will I have pain?  Some discomfort is normal and expected following surgery. The first few days after surgery you may need to use prescription pain pills. Taking Tylenol (acetaminophen) regularly may help prevent pain.  Discomfort should gradually decrease and Tylenol should be sufficient to relieve pain. A foreign body sensation in the cornea of the eye is very common and caused by sutures placed at surgery. These sutures will go away in one to two weeks. If the pain worsens, you should call the doctor.  Do I need to wear an eye patch?  You do not need to wear an eye patch at home after the doctor has removed the patch on your first day after surgery. However, you may be more comfortable wearing a patch outside in the sun, when sleeping or napping, or in a timi, windy environment.  How much drainage should I have?  You may expect a moderate amount of drainage for a  week. Gradually the drainage should decrease. The lids can be cleaned with a clean washcloth and gentle soap or diluted baby shampoo. Wipe the eyelids gently from the nose outward. Some blood in the tears is a normal finding.  Will there be swelling?  Some swelling is normal for about a week or two after which it will gradually decrease. Applying a cool compress, using a clean washcloth for 5 - 10 minutes several times a day may reduce the swelling and make you more comfortable. People may have some swelling of both eyes, especially if face down positioning is required. The white part of the eye may appear very red or bloody for a week or two. This may get worse a few days after surgery. Though the bright red appearance can look frightening, it is a normal finding early after surgery and will resolve in a few weeks.  Will I need to use eye drops?  You will be using several different kinds of eye drops or ointment (salve) when you leave the hospital. The directions will be on each bottle or tube. The medication with the red top will keep your eye dilated and may make your eye more sensitive to light. Wearing sunglasses may help. The other medication is a combination antibiotic/steroid to prevent infection and promote healing.  Occasionally a third drop is used to control the pressure in your eye. A new bottle of artificial tears or lubricant ointment may be used along with your prescription eye drops after surgery. You will be using drops from four to eight weeks. Bring all eye medications (drops. ointments, or pills) with you  to each visit.   Always wash your hands before putting in the eye drops. You may wish to have someone else help you. Pull down on the lower lid and squeeze one drop from the bottle being careful not to touch the dropper to your eye or eyelid. One drop is sufficient, but another may be used if the first did not go into the eye. It is often easier to put in the drops if you are reclining or  lying down. Wait five (5) minutes after the first drop before using the second drop to allow the medications to absorb into the eye.  How long will it take for my vision to improve?  Your vision should gradually improve, but it may take up to six months to regain your best vision. Frequently, air or gas bubbles are injected into the eye at the time of surgery. This will blur your vision significantly at first. As the bubble becomes smaller it will cause a black line in your vision that moves as you move your head. As the bubble becomes smaller you may notice that it looks more like a bubble or that it will break up into several smaller bubbles. It will take from a few days to a few weeks for the bubble to dissolve and be replaced by clear fluid.  You may notice floaters or double vision after your surgery. These symptoms usually will decrease with time. If the double vision is bothersome patching the eye may help.  If you notice a sudden worsening in your vision call your doctor.    Heavy lifting (greater than 50 pounds), swimming and contact sports should be avoided for about 3 to 4 weeks after surgery.  You may resume your usual sexual activities about one week after surgery.  When may I return to work or my normal activities?  Depending on the type or work, you may return to work within a few days. If your work involves physical activity or driving, you will need to restrict your activities and remain home longer.  You may watch television, look at magazines, or work puzzles. Reading may be uncomfortable for several days, but using the eyes will not cause any damage.  You may go outside as usual. If conditions are windy or timi, wear an eye pad to avoid getting dust or dirt in the eye.      Are there any driving restrictions?  Someone will need to drive you home from the hospital. Generally driving can be resumed in several days if you have good vision in your other eye. If you do not feel comfortable driving,  do not drive! Your depth perception will be decreased so you will want to try driving during the day in light traffic until you feel comfortable driving. You should restrict your driving while you are taking prescription pain pills as they also can affect your judgment.  When can I shower and wash my hair?  You may shower or bathe when you get home, but avoid getting water in your eye. You may want someone to help you shampoo your hair at first.  You may shave, brush your teeth, or comb your hair. Do not use make-up, mascara, or creams/lotions around your eye for several weeks.  When will I see the doctor again?  Generally, you will be seen the first day after surgery and again 1-2 weeks later. If you have not received a return appointment before leaving the hospital, you should call our office during the business hours to arrange an appointment. If you will be seeing your local doctor instead of us, you will need to call that office to set up an appointment.  How do I reach a doctor if I have concerns?  One of our doctors is available by calling [facility] [phone]. Please try to call for routine questions and prescription refills during business hours.  You should call your doctor if:  You notice a sudden decrease in your vision.  Have severe pain or pain increases rather than subsiding.    You notice a new black curtain over your eye that is not the gas bubble. If you have any of these symptoms, you may need to be examined.        2/14/14    Dr. Lindsey   [facility]          Please call [phone] if you have any questions for Dr Lindsey

## 2017-05-12 NOTE — ANESTHESIA PREPROCEDURE EVALUATION
Procedure: Procedure(s):  VITRECTOMY PARSPLANA WITH 25 GAUGE SYSTEM  IMPLANT SECONDARY INTRAOCULAR LENS IMPLANT  Preop diagnosis: RIGHT EYE DISLOCATED IOL    No Known Allergies  Past Medical History:   Diagnosis Date     Hernia of unspecified site of abdominal cavity without mention of obstruction or gangrene      Hypertension      Retinal detachment 1993    [facility]     Sleep apnea      Past Surgical History:   Procedure Laterality Date     BYPASS GASTRIC DUODENAL SWITCH       COLONOSCOPY       EYE SURGERY       ORTHOPEDIC SURGERY       Prior to Admission medications    Medication Sig Start Date End Date Taking? Authorizing Provider   NIFEDIPINE PO Take 90 mg by mouth daily   Yes Reported, Patient   metoprolol (TOPROL-XL) 200 MG 24 hr tablet Take 200 mg by mouth daily   Yes Reported, Patient   Celecoxib (CELEBREX PO) Take 200 mg by mouth daily   Yes Reported, Patient   FLUOXETINE HCL PO Take 60 mg by mouth daily   Yes Reported, Patient   ZOLPIDEM TARTRATE PO Take 10 mg by mouth At Bedtime   Yes Reported, Patient   lisinopril-hydrochlorothiazide (PRINZIDE/ZESTORETIC) 20-12.5 MG per tablet Take 1 tablet by mouth 2 times daily   Yes Reported, Patient   Cyanocobalamin (VITAMIN B-12 PO)    Yes Reported, Patient   tamsulosin (FLOMAX) 0.4 MG capsule Take by mouth daily   Yes Reported, Patient   pantoprazole (PROTONIX) 40 MG enteric coated tablet Take 1 tablet by mouth 2 times daily. 8/9/12  Yes Samuel Eckert DO   Ascorbic Acid (VITAMIN C PO) Take 500 mg by mouth.   Yes Reported, Patient   calcium carbonate-vitamin D (CALCIUM + D) 600-200 MG-UNIT TABS Take 1 tablet by mouth daily.   Yes Reported, Patient   acetaminophen (TYLENOL) 325 MG tablet Take 2 tablets by mouth every 6 hours as needed. Pain. 8/9/12   Samuel Eckert DO     Current Facility-Administered Medications Ordered in Epic   Medication Dose Route Frequency Last Rate Last Dose     lactated ringers infusion   Intravenous Continuous         bupivacaine 0.75%  (pf) 4.5mL + lidocaine 2% w/EPI 1:111866 (pf) 4.5mL + hyaluronidase (HYLENEX) 150 units   Retrobulbar Once         lidocaine 1 % 1 mL  1 mL Other Q1H PRN   1 mL at 05/12/17 0954     lactated ringers infusion   Intravenous Continuous 25 mL/hr at 05/12/17 0954       No current Epic-ordered outpatient prescriptions on file.     Wt Readings from Last 1 Encounters:   05/11/17 (!) 143.8 kg (317 lb)     Temp Readings from Last 1 Encounters:   05/12/17 36.8  C (98.2  F) (Temporal)     BP Readings from Last 6 Encounters:   05/12/17 123/86   04/15/17 (!) 159/107   08/09/12 139/80     Pulse Readings from Last 4 Encounters:   04/15/17 76   08/09/12 69     Resp Readings from Last 1 Encounters:   05/12/17 16     SpO2 Readings from Last 1 Encounters:   05/12/17 98%     Recent Labs   Lab Test  05/19/15   1502  08/09/12   0250  08/08/12   0517   NA   --   141  144   POTASSIUM   --   3.8  3.7   CHLORIDE   --   108  115*   CO2   --   27  26   ANIONGAP   --   6  4*   GLC  93  99  96   BUN   --   7  11   CR   --   0.66  0.62*   KELLY   --   7.6*  6.9*     Recent Labs   Lab Test  08/09/12   0250  08/08/12   2130   08/08/12   0517   WBC  7.4   --    --   7.9   HGB  8.2*  8.4*   < >  8.2*   PLT  151   --    --   142*    < > = values in this interval not displayed.     RECENT LABS:   Reviewed    ECG: Afib, no RVR    STRESS ECHO: 2016  FINAL CONCLUSIONS   Probably normal study with diaphragmatic attenuation.   Atrial fibrillation.   Stress ECG is negative for myocardial ischemia.   EF may not be accurate due to AF; see below.    Echo:    Final Conclusion   Normal LV size and systolic function with estimated ejection fraction of 55%.   No regional wall motion abnormalities.   Moderate left atrial dilatation. Mild right atrial dilatation.   No significant valvular heart disease noted.   Right ventricular systolic pressure estimated at 26.3 mmHg + RAP.   Probable normal right ventricular size and function.   Mild aortic dilatation at the level of  the sinuses of Valsalva (4.2 cm). Mildly dilated   ascending aorta (4.0 cm).    Anesthesia Evaluation     . Pt has had prior anesthetic.     No history of anesthetic complications          ROS/MED HX    ENT/Pulmonary:     (+)sleep apnea, uses CPAP , . .   (-) tobacco use, asthma, COPD and recent URI   Neurologic:      (-) seizures and CVA   Cardiovascular:     (+) Dyslipidemia, hypertension----. : . . . :. .      (-) angina, CAD, syncope, arrhythmias, irregular heartbeat/palpitations, valvular problems/murmurs and angina   METS/Exercise Tolerance:     Hematologic:         Musculoskeletal:         GI/Hepatic:     (+) Other GI/Hepatic s/p GIB     (-) GERD and liver disease   Renal/Genitourinary:     (+) BPH,    (-) renal disease   Endo:     (+) Obesity, .   (-) Type II DM, thyroid disease and chronic steroid usage   Psychiatric:         Infectious Disease:         Malignancy:         Other:                     Physical Exam      Airway   Mallampati: I  TM distance: >3 FB  Neck ROM: full    Dental   (+) upper dentures    Cardiovascular   Rhythm and rate: irregular and normal  (-) no murmur    Pulmonary    breath sounds clear to auscultation(-) no wheezes                    Anesthesia Plan      History & Physical Review  History and physical reviewed and following examination; no interval change.    ASA Status:  3 .    NPO Status:  > 8 hours    Plan for MAC   PONV prophylaxis:  Ondansetron (or other 5HT-3)  Avoid Fentanyl  Light sedation after propofol/precedex for RBBB       Postoperative Care      Consents  Anesthetic plan, risks, benefits and alternatives discussed with:  Patient..

## 2017-05-12 NOTE — IP AVS SNAPSHOT
[facility]    [address]    [place] [address]    Phone:  [phone]    Fax:  [phone]                                       After Visit Summary   5/12/2017    Leodan Yanez    MRN: [id number]           After Visit Summary Signature Page     I have received my discharge instructions, and my questions have been answered. I have discussed any challenges I see with this plan with the nurse or doctor.    ..........................................................................................................................................  Patient/Patient Representative Signature      ..........................................................................................................................................  Patient Representative Print Name and Relationship to Patient    ..................................................               ................................................  Date                                            Time    ..........................................................................................................................................  Reviewed by Signature/Title    ...................................................              ..............................................  Date                                                            Time

## 2017-05-12 NOTE — ANESTHESIA POSTPROCEDURE EVALUATION
Patient: Leodan Yanez    Procedure(s):  RIGHT EYE VITRECTOMY PARSPLANA WITH 25 GAUGE SYSTEM, ENDOLASER,  REPOSIONING OF INTRAOCULAR LENS WITH SCLERAL FIXATION - Wound Class: I-Clean   - Wound Class: I-Clean    Diagnosis:RIGHT EYE DISLOCATED IOL  Diagnosis Additional Information: No value filed.    Anesthesia Type:  MAC    Note:  Anesthesia Post Evaluation    Patient location during evaluation: PACU  Patient participation: Able to fully participate in evaluation  Level of consciousness: awake  Pain management: adequate  Airway patency: patent  Cardiovascular status: acceptable  Respiratory status: acceptable  Hydration status: acceptable  PONV: none     Anesthetic complications: None          Last vitals:  Vitals:    05/12/17 0944 05/12/17 1220   BP: 123/86 111/77   Resp: 16 18   Temp: 36.8  C (98.2  F)    SpO2: 98% 95%         Electronically Signed By: Zeyad Daniels MD  May 12, 2017  2:01 PM

## 2017-05-12 NOTE — OR NURSING
AOX3-VSS-O2 sats >94% RA- no positional issues per post op report- no gas per chart- no green band in p[lace- clarified no positional issues, no need for green band and OK for CPAP when home with Charge Nurse- confirmed by Charge Nurse. Pt and wife verbalize understanding of discharge instructions, denies questions. Up to BR voids cl yellow- Up to W/C- transported to door for discharge to home.

## 2017-05-12 NOTE — OP NOTE
Attending: John Lindsey MD    Fellow: Edel Ruiz MD    Pre-operative diagnosis: Dislocated intraocular lens, right eye    Pre-operative diagnosis: Dislocated intraocular lens, right eye    Procedure: Repositioning of intraocular lens with scleral fixation, right eye    Anesthesia: MAC with retrobulbar block    Complications: None    Indications for Procedure:  The patient has a history of dislocated 3 piece intraocular lens and high myopia. The risks including, but not limited to infection, loss of vision, loss of eye, need for more surgery, and bleeding, along with the benefits, alternatives, expectations, and the procedure itself were discussed at length with the patient who wished to proceed with surgery.     Description of Procedure:  In the preoperative suite, the patient was identified, the surgical site marked and informed consent was obtained. The patient was then brought back to the operative suite where the appropriate anesthesia monitors were connected. The patient's eye was prepped and draped in the usual sterile fashion for ophthalmic surgery.     The retina team performed a vitrectomy and capsulectomy, dictated under a separate note.      A 2.8 mm keratome was used to make a superonasal clear corneal incision.     The conjunctiva was marked nasally and temporally 2mm posterior to the limbus, 180 degrees away from each other.      A 30g thin walled needle was passed transconjunctivally and through the sclera at the previously made marks 2mm from the nasal limbus, creating an oblique scleral tunnel. The leading haptic was grasped with intraocular forceps and fed into the 30g needle ab interno. A second 30g needle was passed temporally in opposite fashion, and the trailing haptic docked within the needle lumen. Both needles were withdrawn to externalize the haptics. High temp cautery was used to melt the haptic tips and create a flange at the end of each haptic. The haptics were then fed back into  the scleral tunnels and the flanges ensured to be buried under the conjunctiva and buried in the sclera. The intraocular lens was noted to be well centered with minimal tilt of the superior optic. Miochol was instilled, and the intraocular lens remained posterior to the iris.     The superior keratome wound was hydrated and the retina infusion line was removed and the sclerotomy sutured.      Subconjunctival ancef / decadron / lidocaine was injected inferiorly.      A patch and shield were taped over the eye. The patient was then taken to the recovery room in stable condition having tolerated the procedure well and discharged home in good condition.     Dr. John Lindsey was scrubbed and present for the entire case.

## 2017-05-12 NOTE — OP NOTE
SURGEON: Rose Weston M.D. (vitrectomy, endolaser )  Surgeon: John Lindsey MD (scleral fixation of intraocular lens placement)  ASSISTANT SURGEON: Dr. Teo Smith MD  PREOPERATIVE DIAGNOSES:   1. Dislocated intraocular lens right eye   POSTOPERATIVE DIAGNOSES: same  PROCEDURES:   1. 25 gauge pars plana vitrectomy, endolaser right eye, membrane peel right eye   ANESTHESIA: MAC and Retrobulbar block.   COMPLICATIONS: None.   ESTIMATED BLOOD LOSS: Scant.   INDICATIONS: Mr. Beau Alcocer is a 66 year old patient with a history of dislocated intraocular lens. The patient has also history of high myopia. This is a planned combo surgery with Dr. Lindsey  DESCRIPTION OF PROCEDURE:   The patient was taken to the operating room where sedation was administered by the anesthesia department. A retrobulbar block consisting of a 1:1 mixture of 2% lidocaine and 0.75% Marcaine with epinephrine and Wydase was administered with adequate anesthesia and akinesia.  The patient's operative eye was prepped and draped in the usual sterile surgical fashion for ophthalmic surgery, including instillation of 1 drop of 5% povidone iodine. A sterile drape was placed over the face and body and a lid speculum was inserted. The microscope was brought into the operative field.   A 25-gauge trocar was placed inferoly at 6 o'clock 3.5 mm posterior to the limbus. the infusion cannula was connected and its intravitreal location was verified by direct visualization. another 23-gauge trocar was placed superior nasally and superior temporally. Next, the vitrector handpiece and endoilluminator were placed in the eye. Upon entering the eye it was noted the patient to have a dislocated intraocular lens. A vitrectomy was performed. The Vitrectomy was assisted with scleral depression 360 degrees. Next a paracenthesis was performed inferior temporally. Next the intraocular lens was brought to the anterior chamber using the vitrector and a vitreous forceps  placed through the paracenthesis. Next, the lens capsule and lens material were removed around the intraocular lens. Next endolaser was applied 360 degrees to the periphery. The retina remained attached.     Next Dr. Lindsey proceed with the sclera fixation of the intraocular lens and this will be dictated in a separate op note     Next, the instruments were removed. myochol was administered to induced pupillary constriction. the trocars and the infusion were removed and the sclerotomies were closed with 6-0 plain gut sutures. The wounds were hydrated. Subconjunctival injections of ceftazidime and dexamethasone were administered.     The patient tolerated the procedure well and was discharge to the PACU in stable condition.  The surgery was assisted by dr. nataly MD, our current resident who was available on the day of the surgery. Dr. Smith assisted with parts of the vitrectomy.

## 2017-05-16 ENCOUNTER — OFFICE VISIT (OUTPATIENT)
Dept: OPHTHALMOLOGY | Facility: CLINIC | Age: 67
End: 2017-05-16
Attending: OPHTHALMOLOGY
Payer: COMMERCIAL

## 2017-05-16 DIAGNOSIS — Z48.810 AFTERCARE FOLLOWING SURGERY OF A SENSORY ORGAN: Primary | ICD-10-CM

## 2017-05-16 DIAGNOSIS — T85.22XS DISLOCATED INTRAOCULAR LENS, SEQUELA: ICD-10-CM

## 2017-05-16 PROCEDURE — 99212 OFFICE O/P EST SF 10 MIN: CPT | Mod: ZF

## 2017-05-16 ASSESSMENT — VISUAL ACUITY
OS_CC+: -2
OS_CC: 20/25
OD_CC+: -2
METHOD: SNELLEN - LINEAR
OD_CC: 20/60

## 2017-05-16 ASSESSMENT — TONOMETRY
OD_IOP_MMHG: 15
OS_IOP_MMHG: 19
IOP_METHOD: ICARE

## 2017-05-16 ASSESSMENT — SLIT LAMP EXAM - LIDS: COMMENTS: NORMAL

## 2017-05-16 NOTE — MR AVS SNAPSHOT
After Visit Summary   5/16/2017    Leodan Yanez    MRN: [id number]           Patient Information     Date Of Birth          1950        Visit Information        Provider Department      5/16/2017 3:15 PM John Lindsey MD [facility]        Today's Diagnoses     Aftercare following surgery of a sensory organ    -  1    Dislocated intraocular lens, sequela          Care Instructions    Prednisolone six times a day for one week then four times a day until your follow up appointment in 1 month    Stop ofloxacin on 5/18/17    Okay to use preservative free artificial tears every 3-4 hours as needed    Follow up on 5/17/17 at 3 pm with Trista        Follow-ups after your visit        Your next 10 appointments already scheduled     May 17, 2017  3:00 PM CDT   Post-Op with Rose Weston MD   Eye Clinic ([facility])    [address]  [address] [facility] [address]  [address]  [facility] [id number] [phone]              Who to contact     Please call your clinic at [phone] to:    Ask questions about your health    Make or cancel appointments    Discuss your medicines    Learn about your test results    Speak to your doctor   If you have compliments or concerns about an experience at your clinic, or if you wish to file a complaint, please contact [facility] Patient Relations at [phone] or email us at [email or web address]         Additional Information About Your Visit        MyChart Information     [employer] is an electronic gateway that provides easy, online access to your medical records. With [employer], you can request a clinic appointment, read your test results, renew a prescription or communicate with your care team.     To sign up for [employer] visit the website at [email or web address]   You will be asked to enter the access code listed below, as well as some personal information. Please follow the directions to  create your username and password.     Your access code is: [email or web address]  Expires: 2017  5:44 PM     Your access code will  in 90 days. If you need help or a new code, please contact your [facility] or call [phone] for assistance.        Care EveryWhere ID     This is your Care EveryWhere ID. This could be used by other organizations to access your [facility] medical records  [id number]         Blood Pressure from Last 3 Encounters:   17 111/77   04/15/17 (!) 159/107   12 139/80    Weight from Last 3 Encounters:   17 (!) 143.8 kg (317 lb)   12 (!) 142 kg (313 lb 0.9 oz)              Today, you had the following     No orders found for display         Today's Medication Changes          These changes are accurate as of: 17 11:59 PM.  If you have any questions, ask your nurse or doctor.               These medicines have changed or have updated prescriptions.        Dose/Directions    * prednisoLONE acetate 1 % ophthalmic susp   Commonly known as:  PRED FORTE   This may have changed:  Another medication with the same name was added. Make sure you understand how and when to take each.   Used for:  Dislocated intraocular lens, sequela        Dose:  1 drop   Place 1 drop into the right eye 4 times daily   Quantity:  1 Bottle   Refills:  0       * prednisoLONE acetate 1 % ophthalmic susp   Commonly known as:  PRED FORTE   This may have changed:  You were already taking a medication with the same name, and this prescription was added. Make sure you understand how and when to take each.   Used for:  Aftercare following surgery of a sensory organ        Dose:  1 drop   Place 1 drop into the right eye 4 times daily   Quantity:  15 mL   Refills:  0       * Notice:  This list has 2 medication(s) that are the same as other medications prescribed for you. Read the directions carefully, and ask your doctor or other care provider to review them with you.          Where to get your medicines      These medications were sent to [facility]/pharmacy #[id number] - [place], [place] - [address]  [address], [facility] [address]     Phone:  [phone]     prednisoLONE acetate 1 % ophthalmic susp                Primary Care Provider Office Phone # Fax #    Stevie Krueger [phone]       [facility] [id number]  [facility] [address]        Thank you!     Thank you for choosing EYE CLINIC  for your care. Our goal is always to provide you with excellent care. Hearing back from our patients is one way we can continue to improve our services. Please take a few minutes to complete the written survey that you may receive in the mail after your visit with us. Thank you!             Your Updated Medication List - Protect others around you: Learn how to safely use, store and throw away your medicines at www.disposemymeds.org.          This list is accurate as of: 5/16/17 11:59 PM.  Always use your most recent med list.                   Brand Name Dispense Instructions for use    acetaminophen 325 MG tablet    TYLENOL    250 tablet    Take 2 tablets by mouth every 6 hours as needed. Pain.       calcium + D 600-200 MG-UNIT Tabs   Generic drug:  calcium carbonate-vitamin D      Take 1 tablet by mouth daily.       CELEBREX PO      Take 200 mg by mouth daily       FLUOXETINE HCL PO      Take 60 mg by mouth daily       lisinopril-hydrochlorothiazide 20-12.5 MG per tablet    PRINZIDE/ZESTORETIC     Take 1 tablet by mouth 2 times daily       metoprolol 200 MG 24 hr tablet    TOPROL-XL     Take 200 mg by mouth daily       NIFEDIPINE PO      Take 90 mg by mouth daily       ofloxacin 0.3 % ophthalmic solution    OCUFLOX    1 Bottle    Place 1 drop into the right eye 4 times daily       pantoprazole 40 MG EC tablet    PROTONIX    60 tablet    Take 1 tablet by mouth 2 times daily.       * prednisoLONE acetate 1 % ophthalmic susp    PRED FORTE    1  Bottle    Place 1 drop into the right eye 4 times daily       * prednisoLONE acetate 1 % ophthalmic susp    PRED FORTE    15 mL    Place 1 drop into the right eye 4 times daily       tamsulosin 0.4 MG capsule    FLOMAX     Take by mouth daily       VITAMIN B-12 PO          VITAMIN C PO      Take 500 mg by mouth.       ZOLPIDEM TARTRATE PO      Take 10 mg by mouth At Bedtime       * Notice:  This list has 2 medication(s) that are the same as other medications prescribed for you. Read the directions carefully, and ask your doctor or other care provider to review them with you.

## 2017-05-16 NOTE — NURSING NOTE
Chief Complaints and History of Present Illnesses   Patient presents with     Post Op (Ophthalmology) Right Eye     CE IOL RE     HPI    Affected eye(s):  Right   Symptoms:     Blurred vision   No decreased vision   No floaters   No flashes   No tearing   No Dryness   No itching   No burning   No photophobia      Duration:  1 week      Do you have eye pain now?:  No      Comments:  Seems like he is doing worse since last visit in RE everything in vision is worse.  Grey looking through maira and with mild pin holes.  Taking drops as prescribed.  Shield using at night.    Tawny PARKER 3:11 PM May 16, 2017

## 2017-05-16 NOTE — PROGRESS NOTES
CC: POD #1 PPV/EL (McCracken) and repositioning of intraocular lens with scleral fixation (Page) right eye 5/12/17    A/P:    1. POD #4 PPV/EL (McCracken) and repositioning of intraocular lens with scleral fixation (Page) right eye 5/12/17  2. High myopia both eyes  3. Dislocated intraocular lens left eye    Intermittent capture of optic from 12 to 3 o'clock   Prednisolone six times a day right eye  Ofloxacin four times a day right eye  Shield at night  Post op precautions  F/u Wednesday with McCracken

## 2017-05-16 NOTE — PATIENT INSTRUCTIONS
Prednisolone six times a day for one week then four times a day until your follow up appointment in 1 month    Stop ofloxacin on 5/18/17    Okay to use preservative free artificial tears every 3-4 hours as needed    Follow up on 5/17/17 at 3 pm with Trista

## 2017-05-17 ENCOUNTER — OFFICE VISIT (OUTPATIENT)
Dept: OPHTHALMOLOGY | Facility: CLINIC | Age: 67
End: 2017-05-17
Attending: OPHTHALMOLOGY
Payer: COMMERCIAL

## 2017-05-17 DIAGNOSIS — Z48.810 AFTERCARE FOLLOWING SURGERY OF A SENSORY ORGAN: Primary | ICD-10-CM

## 2017-05-17 PROCEDURE — 99212 OFFICE O/P EST SF 10 MIN: CPT | Mod: ZF

## 2017-05-17 RX ORDER — PREDNISOLONE ACETATE 10 MG/ML
1 SUSPENSION/ DROPS OPHTHALMIC 4 TIMES DAILY
Qty: 15 ML | Refills: 0 | Status: ON HOLD | OUTPATIENT
Start: 2017-05-17 | End: 2017-08-14

## 2017-05-17 ASSESSMENT — VISUAL ACUITY
METHOD: SNELLEN - LINEAR
OS_CC+: -2
OS_CC: 20/30
OD_CC: 20/100
CORRECTION_TYPE: GLASSES
OD_CC+: -1
OD_PH_CC: 20/80

## 2017-05-17 ASSESSMENT — REFRACTION_WEARINGRX
OD_SPHERE: -2.50
OS_ADD: +2.75
OS_AXIS: 082
OD_AXIS: 062
OD_CYLINDER: +0.75
OD_ADD: +2.75
OS_CYLINDER: +1.25
OS_SPHERE: -1.50

## 2017-05-17 ASSESSMENT — SLIT LAMP EXAM - LIDS: COMMENTS: NORMAL

## 2017-05-17 ASSESSMENT — TONOMETRY
OS_IOP_MMHG: 12
IOP_METHOD: TONOPEN
OD_IOP_MMHG: 07

## 2017-05-17 NOTE — MR AVS SNAPSHOT
After Visit Summary   2017    Leodan Yanez    MRN: [id number]           Patient Information     Date Of Birth          1950        Visit Information        Provider Department      2017 3:00 PM Rose Weston MD [facility]        Today's Diagnoses     Aftercare following surgery of a sensory organ    -  1       Follow-ups after your visit        Follow-up notes from your care team     Return in about 1 week (around 2017).      Who to contact     Please call your clinic at [phone] to:    Ask questions about your health    Make or cancel appointments    Discuss your medicines    Learn about your test results    Speak to your doctor   If you have compliments or concerns about an experience at your clinic, or if you wish to file a complaint, please contact [facility] Patient Relations at [phone] or email us at [email or web address]         Additional Information About Your Visit        MyChart Information     ServiceBencht is an electronic gateway that provides easy, online access to your medical records. With Nanameue, you can request a clinic appointment, read your test results, renew a prescription or communicate with your care team.     To sign up for ServiceBencht visit the website at [email or web address]   You will be asked to enter the access code listed below, as well as some personal information. Please follow the directions to create your username and password.     Your access code is: [email or web address]  Expires: 2017  5:44 PM     Your access code will  in 90 days. If you need help or a new code, please contact your [facility] or call [phone] for assistance.        Care EveryWhere ID     This is your Care EveryWhere ID. This could be used by other organizations to access your [place] medical records  [id number]         Blood Pressure from Last 3 Encounters:   17 111/77    04/15/17 (!) 159/107   08/09/12 139/80    Weight from Last 3 Encounters:   05/11/17 (!) 143.8 kg (317 lb)   08/08/12 (!) 142 kg (313 lb 0.9 oz)              Today, you had the following     No orders found for display       Primary Care Provider Office Phone # Fax #    Stevie Krueger [phone]       [facility] [address]  [facility] [address]        Thank you!     Thank you for choosing EYE CLINIC  for your care. Our goal is always to provide you with excellent care. Hearing back from our patients is one way we can continue to improve our services. Please take a few minutes to complete the written survey that you may receive in the mail after your visit with us. Thank you!             Your Updated Medication List - Protect others around you: Learn how to safely use, store and throw away your medicines at www.disposemymeds.org.          This list is accurate as of: 5/17/17  3:52 PM.  Always use your most recent med list.                   Brand Name Dispense Instructions for use    acetaminophen 325 MG tablet    TYLENOL    250 tablet    Take 2 tablets by mouth every 6 hours as needed. Pain.       calcium + D 600-200 MG-UNIT Tabs   Generic drug:  calcium carbonate-vitamin D      Take 1 tablet by mouth daily.       CELEBREX PO      Take 200 mg by mouth daily       FLUOXETINE HCL PO      Take 60 mg by mouth daily       lisinopril-hydrochlorothiazide 20-12.5 MG per tablet    PRINZIDE/ZESTORETIC     Take 1 tablet by mouth 2 times daily       metoprolol 200 MG 24 hr tablet    TOPROL-XL     Take 200 mg by mouth daily       NIFEDIPINE PO      Take 90 mg by mouth daily       ofloxacin 0.3 % ophthalmic solution    OCUFLOX    1 Bottle    Place 1 drop into the right eye 4 times daily       pantoprazole 40 MG EC tablet    PROTONIX    60 tablet    Take 1 tablet by mouth 2 times daily.       * prednisoLONE acetate 1 % ophthalmic susp    PRED FORTE    1 Bottle    Place 1 drop into the  right eye 4 times daily       * prednisoLONE acetate 1 % ophthalmic susp    PRED FORTE    15 mL    Place 1 drop into the right eye 4 times daily       tamsulosin 0.4 MG capsule    FLOMAX     Take by mouth daily       VITAMIN B-12 PO          VITAMIN C PO      Take 500 mg by mouth.       ZOLPIDEM TARTRATE PO      Take 10 mg by mouth At Bedtime       * Notice:  This list has 2 medication(s) that are the same as other medications prescribed for you. Read the directions carefully, and ask your doctor or other care provider to review them with you.

## 2017-05-17 NOTE — NURSING NOTE
Chief Complaints and History of Present Illnesses   Patient presents with     Post Op (Ophthalmology) Right Eye      PPV/EL ([place]) and repositioning of intraocular lens with scleral fixation (Page) right eye 5/12/17     HPI    Affected eye(s):  Right   Symptoms:        Duration:  1 day   Frequency:  Constant       Do you have eye pain now?:  No      Comments:  Pt. States that he has had FB sensation RE.  No change in VA RE.  Some floaters RE.  Ana Arreguin COT 2:58 PM May 17, 2017

## 2017-05-19 ENCOUNTER — TELEPHONE (OUTPATIENT)
Dept: OPHTHALMOLOGY | Facility: CLINIC | Age: 67
End: 2017-05-19

## 2017-05-19 NOTE — TELEPHONE ENCOUNTER
Pt seen by dr. edwards and asked to f/u with dr. Lindsey in 1-2 weeks for dislocated intraocular lens  Pt needed to confirm transportation before scheduling and provided direct triage number  Dr. Lindsey out next week and limited scheduling without overbooking  No call back by this Friday 5-19-17  Scheduled appt for thursday may 25th in one the last add on slots for the week  Left message with direct phone number to confirm date/time or if other arrangements need to be made  Landon Galicia RN 2:00 PM 05/19/17    Note to dr. edwards's facilitator for review

## 2017-06-01 ENCOUNTER — OFFICE VISIT (OUTPATIENT)
Dept: OPHTHALMOLOGY | Facility: CLINIC | Age: 67
End: 2017-06-01
Attending: OPHTHALMOLOGY
Payer: COMMERCIAL

## 2017-06-01 DIAGNOSIS — T85.22XS DISLOCATED INTRAOCULAR LENS, SEQUELA: Primary | ICD-10-CM

## 2017-06-01 DIAGNOSIS — Z96.1 PSEUDOPHAKIA OF BOTH EYES: ICD-10-CM

## 2017-06-01 PROCEDURE — 99213 OFFICE O/P EST LOW 20 MIN: CPT | Mod: ZF

## 2017-06-01 ASSESSMENT — CONF VISUAL FIELD
OD_NORMAL: 1
OS_NORMAL: 1

## 2017-06-01 ASSESSMENT — TONOMETRY
OS_IOP_MMHG: 16
OD_IOP_MMHG: 14
IOP_METHOD: TONOPEN

## 2017-06-01 ASSESSMENT — REFRACTION_MANIFEST
OD_CYLINDER: +3.75
OD_AXIS: 045
OD_SPHERE: -7.00

## 2017-06-01 ASSESSMENT — VISUAL ACUITY
METHOD: SNELLEN - LINEAR
OS_PH_CC: 20/20
OD_CC: 20/200
CORRECTION_TYPE: GLASSES
OS_CC+: -3
OS_CC: 20/25
OD_PH_CC: 20/60-2

## 2017-06-01 ASSESSMENT — REFRACTION_WEARINGRX
OD_CYLINDER: +0.50
OS_SPHERE: -1.25
OD_ADD: +2.75
OD_AXIS: 073
OD_SPHERE: -2.25
OS_ADD: +2.75
OS_AXIS: 016
OS_CYLINDER: +1.25

## 2017-06-01 ASSESSMENT — SLIT LAMP EXAM - LIDS: COMMENTS: NORMAL

## 2017-06-01 NOTE — NURSING NOTE
Chief Complaints and History of Present Illnesses   Patient presents with     Follow Up For      Aftercare following surgery of a sensory organ     HPI    Affected eye(s):  Right   Symptoms:        Duration:  19 days   Frequency:  Constant       Do you have eye pain now?:  No      Comments:  He is her today for Aftercare following surgery of a sensory organ RENina  Leodan states his vision is the same as at his last visit, which is blurry.    Jose Antonio Blair COT 3:21 PM June 1, 2017

## 2017-06-11 NOTE — PROGRESS NOTES
3 weeks s/p vitrectomy, intraocular lens repositioning with transcleral fixation right eye   Eye cooling off  Good centration of intraocular lens but tilted - likely attributable to distortion of haptics from dislocated / fibrosed capsule prior to surgery    manifest refraction today - 20/40 with 3.75 cylinder    Cont  To observe for now for stability and resolution of Anterior chamber cells    Defer decision on intraocular lens status for now, consider explant with replacement as needed      ~~~~~~~~~~~~~~~~~~~~~~~~~~~~~~~~~~~~~~~~~~~~~~~~~~~~~~~~~~~~~~~~    Complete documentation of historical and exam elements from today's encounter can be found in the full encounter summary report (not reduplicated in this progress note). I personally obtained the chief complaint(s) and history of present illness.  I confirmed and edited as necessary the review of systems, past medical/surgical history, family history, social history, and examination findings as documented by others.  I examined the patient myself, and I personally reviewed the relevant tests, images, and reports as documented above. I formulated and edited as necessary the assessment and plan and discussed the findings and management plan with the patient and family.     John Lindsey MD, MA  Director, Cornea & Anterior Segment  [facility] Department of Ophthalmology & Visual Neuroscience

## 2017-06-22 ENCOUNTER — OFFICE VISIT (OUTPATIENT)
Dept: OPHTHALMOLOGY | Facility: CLINIC | Age: 67
End: 2017-06-22
Attending: OPHTHALMOLOGY
Payer: COMMERCIAL

## 2017-06-22 DIAGNOSIS — T85.22XS DISLOCATED INTRAOCULAR LENS, SEQUELA: Primary | ICD-10-CM

## 2017-06-22 PROCEDURE — 99212 OFFICE O/P EST SF 10 MIN: CPT | Mod: ZF

## 2017-06-22 ASSESSMENT — TONOMETRY
IOP_METHOD: ICARE
OS_IOP_MMHG: 18
OD_IOP_MMHG: 19

## 2017-06-22 ASSESSMENT — VISUAL ACUITY
OD_PH_SC: 20/60
METHOD: SNELLEN - LINEAR
OS_PH_SC: 20/25
OD_SC: 20/200
OS_SC: 20/30
CORRECTION_TYPE: GLASSES

## 2017-06-22 ASSESSMENT — REFRACTION_MANIFEST
OD_CYLINDER: +3.75
OD_ADD: +2.75
OD_SPHERE: -7.25
OD_AXIS: 055

## 2017-06-22 ASSESSMENT — SLIT LAMP EXAM - LIDS: COMMENTS: NORMAL

## 2017-06-22 NOTE — NURSING NOTE
Chief Complaints and History of Present Illnesses   Patient presents with     Post Op (Ophthalmology) Right Eye      s/p vitrectomy, intraocular lens repositioning with transcleral fixation right eye      HPI    Affected eye(s):  Right   Symptoms:        Duration:  3 weeks   Frequency:  Constant       Do you have eye pain now?:  No      Comments:  Pt. States VA is still blurry RE.  No c/o comfort BE.  Ana Arreguin COT 3:15 PM June 22, 2017

## 2017-06-22 NOTE — PROGRESS NOTES
6 weeks s/p vitrectomy, intraocular lens repositioning with transcleral fixation right eye 5/12/17  Eye cooling off  Good centration of intraocular lens but tilted - likely attributable to distortion of haptics from dislocated / fibrosed capsule prior to surgery    manifest refraction today - 20/20 with 3.75 cylinder    Anisometropia too great for patient to tolerate    Could not tolerate anisometropia in trial frame  Would plan for explant with replacement in the near future right eye  Re-start prednisolone twice times a day right eye for AC reaction    RBB  90 minutes   Biometry on file  Plan Anterior chamber maintainer  Repeat scleral fixation vs Anterior chamber intraocular lens (ACIOL)   Will need to cut haptics to explant current lens        ~~~~~~~~~~~~~~~~~~~~~~~~~~~~~~~~~~~~~~~~~~~~~~~~~~~~~~~~~~~~~~~~    Complete documentation of historical and exam elements from today's encounter can be found in the full encounter summary report (not reduplicated in this progress note). I personally obtained the chief complaint(s) and history of present illness.  I confirmed and edited as necessary the review of systems, past medical/surgical history, family history, social history, and examination findings as documented by others.  I examined the patient myself, and I personally reviewed the relevant tests, images, and reports as documented above. I formulated and edited as necessary the assessment and plan and discussed the findings and management plan with the patient and family.     John Lindsey MD, MA  Director, Cornea & Anterior Segment  [facility] Department of Ophthalmology & Visual Neuroscience

## 2017-06-22 NOTE — MR AVS SNAPSHOT
After Visit Summary   6/22/2017    Leodan aYnez    MRN: [id number]           Patient Information     Date Of Birth          1950        Visit Information        Provider Department      6/22/2017 3:30 PM John Lindsey MD [facility]        Today's Diagnoses     Dislocated intraocular lens, sequela    -  1       Follow-ups after your visit        Your next 10 appointments already scheduled     Aug 14, 2017   Procedure with John Lindsey MD   [facility] PeriOP Services (--)    [address]  [facility] [address]   [phone]            Aug 15, 2017  3:15 PM CDT   Post-Op with John Lindsey MD   Eye Clinic ([facility])    [address]  [address] [facility]  [address] [address]  [facility] [place] [id number] [phone]            Aug 22, 2017  3:15 PM CDT   Post-Op with John Lindsey MD   Eye Clinic ([facility])    [address]  [address] [facility]  [address] [address]  [facility] [place] [id number] [phone]              Who to contact     Please call your clinic at [phone] to:    Ask questions about your health    Make or cancel appointments    Discuss your medicines    Learn about your test results    Speak to your doctor   If you have compliments or concerns about an experience at your clinic, or if you wish to file a complaint, please contact [facility] Patient Relations at [phone] or email us at [email or web address]         Additional Information About Your Visit        MyChart Information     [email or web address] is an electronic gateway that provides easy, online access to your medical records. With [email or web address], you can request a clinic appointment, read your test results, renew a prescription or communicate with your care team.     To sign up for [email or web address] visit the website at [email or web address]   You will be asked to enter the access code listed below, as well as some personal  information. Please follow the directions to create your username and password.     Your access code is: [email or web address]  Expires: 10/26/2017 11:01 AM     Your access code will  in 90 days. If you need help or a new code, please contact your [facility] or call [phone] for assistance.        Care EveryWhere ID     This is your Care EveryWhere ID. This could be used by other organizations to access your [place] medical records  [id number]         Blood Pressure from Last 3 Encounters:   17 111/77   04/15/17 (!) 159/107   12 139/80    Weight from Last 3 Encounters:   17 (!) 143.8 kg (317 lb)   12 (!) 142 kg (313 lb 0.9 oz)              We Performed the Following     Asya-Operative Worksheet        Primary Care Provider Office Phone # Fax #    Stevie Krueger [phone]       [facility] [address] [facility] [address]        Equal Access to Services     [facility]: Hadii aad ku hadasho Socolleen, waaxda luqadaha, qaybta kaalmada adeegyaaz, karen escobar . So [facility] [phone].    ATENCIÓN: Si habla español, tiene a martinez disposición servicios gratuitos de asistencia lingüística. [facility] [phone].    We comply with applicable federal civil rights laws and [place] laws. We do not discriminate on the basis of race, color, national origin, age, disability sex, sexual orientation or gender identity.            Thank you!     Thank you for choosing EYE CLINIC  for your care. Our goal is always to provide you with excellent care. Hearing back from our patients is one way we can continue to improve our services. Please take a few minutes to complete the written survey that you may receive in the mail after your visit with us. Thank you!             Your Updated Medication List - Protect others around you: Learn how to safely use, store and throw away your medicines at  www.disposemymeds.org.          This list is accurate as of: 6/22/17 11:59 PM.  Always use your most recent med list.                   Brand Name Dispense Instructions for use Diagnosis    acetaminophen 325 MG tablet    TYLENOL    250 tablet    Take 2 tablets by mouth every 6 hours as needed. Pain.    Upper GI bleed       calcium + D 600-200 MG-UNIT Tabs   Generic drug:  calcium carbonate-vitamin D      Take 1 tablet by mouth daily.        CELEBREX PO      Take 200 mg by mouth daily        FLUOXETINE HCL PO      Take 60 mg by mouth daily        lisinopril-hydrochlorothiazide 20-12.5 MG per tablet    PRINZIDE/ZESTORETIC     Take 1 tablet by mouth 2 times daily        metoprolol 200 MG 24 hr tablet    TOPROL-XL     Take 200 mg by mouth daily        NIFEDIPINE PO      Take 90 mg by mouth daily        ofloxacin 0.3 % ophthalmic solution    OCUFLOX    1 Bottle    Place 1 drop into the right eye 4 times daily    Dislocated intraocular lens, sequela       pantoprazole 40 MG EC tablet    PROTONIX    60 tablet    Take 1 tablet by mouth 2 times daily.    Upper GI bleed       * prednisoLONE acetate 1 % ophthalmic susp    PRED FORTE    1 Bottle    Place 1 drop into the right eye 4 times daily    Dislocated intraocular lens, sequela       * prednisoLONE acetate 1 % ophthalmic susp    PRED FORTE    15 mL    Place 1 drop into the right eye 4 times daily    Aftercare following surgery of a sensory organ       tamsulosin 0.4 MG capsule    FLOMAX     Take by mouth daily        VITAMIN B-12 PO           VITAMIN C PO      Take 500 mg by mouth.        ZOLPIDEM TARTRATE PO      Take 10 mg by mouth At Bedtime        * Notice:  This list has 2 medication(s) that are the same as other medications prescribed for you. Read the directions carefully, and ask your doctor or other care provider to review them with you.

## 2017-06-23 ENCOUNTER — TELEPHONE (OUTPATIENT)
Dept: OPHTHALMOLOGY | Facility: CLINIC | Age: 67
End: 2017-06-23

## 2017-06-27 ENCOUNTER — HOSPITAL ENCOUNTER (OUTPATIENT)
Facility: CLINIC | Age: 67
End: 2017-06-27
Attending: OPHTHALMOLOGY | Admitting: OPHTHALMOLOGY
Payer: COMMERCIAL

## 2017-06-27 ENCOUNTER — TELEPHONE (OUTPATIENT)
Dept: OPHTHALMOLOGY | Facility: CLINIC | Age: 67
End: 2017-06-27

## 2017-07-07 RX ORDER — ERGOCALCIFEROL 1.25 MG/1
50000 CAPSULE, LIQUID FILLED ORAL
COMMUNITY
End: 2017-07-10 | Stop reason: HOSPADM

## 2017-07-07 RX ORDER — MULTIPLE VITAMINS W/ MINERALS TAB 9MG-400MCG
1 TAB ORAL DAILY
COMMUNITY
End: 2017-07-10 | Stop reason: HOSPADM

## 2017-07-26 ENCOUNTER — TELEPHONE (OUTPATIENT)
Dept: OPHTHALMOLOGY | Facility: CLINIC | Age: 67
End: 2017-07-26

## 2017-07-26 NOTE — TELEPHONE ENCOUNTER
Cell phone number: [phone]  Pt had to cancel last surgery-- was with son in [place]  Pt back now and would like to get the process started again    Reviewed would likely need pre-op done again if over 30 days and will have surgery scheduling review if need appt with dr. Lindsey-- no indication needs to see dr. Lindsey prior.    Note to surgery scheduler for call back    Landon Galicia RN 11:34 AM 07/26/17

## 2017-07-27 ENCOUNTER — TELEPHONE (OUTPATIENT)
Dept: OPHTHALMOLOGY | Facility: CLINIC | Age: 67
End: 2017-07-27

## 2017-07-31 ENCOUNTER — TRANSFERRED RECORDS (OUTPATIENT)
Dept: HEALTH INFORMATION MANAGEMENT | Facility: CLINIC | Age: 67
End: 2017-07-31

## 2017-08-03 ENCOUNTER — OFFICE VISIT (OUTPATIENT)
Dept: SURGERY | Facility: CLINIC | Age: 67
End: 2017-08-03
Payer: COMMERCIAL

## 2017-08-03 VITALS
OXYGEN SATURATION: 97 % | HEIGHT: 73 IN | HEART RATE: 64 BPM | DIASTOLIC BLOOD PRESSURE: 88 MMHG | WEIGHT: 315 LBS | BODY MASS INDEX: 41.75 KG/M2 | SYSTOLIC BLOOD PRESSURE: 122 MMHG

## 2017-08-03 DIAGNOSIS — K43.2 INCISIONAL HERNIA, WITHOUT OBSTRUCTION OR GANGRENE: Primary | ICD-10-CM

## 2017-08-03 DIAGNOSIS — K43.2 INCISIONAL HERNIA: Primary | ICD-10-CM

## 2017-08-03 PROCEDURE — 99203 OFFICE O/P NEW LOW 30 MIN: CPT | Performed by: SURGERY

## 2017-08-03 ASSESSMENT — ENCOUNTER SYMPTOMS
WEIGHT GAIN: 1
DIARRHEA: 1

## 2017-08-03 NOTE — PROGRESS NOTES
HPI      ROS (Review of Systems):      Positive for weight gain.   Cardiovascular: Positive for hypertension.   GASTROINTESTINAL: Positive for diarrhea.          Physical Exam

## 2017-08-03 NOTE — MR AVS SNAPSHOT
After Visit Summary   8/3/2017    Leodan Yanez    MRN: [id number]           Patient Information     Date Of Birth          1950        Visit Information        Provider Department      8/3/2017 2:30 PM Cayetano Vivar MD [facility] General Surgery      Today's Diagnoses     Incisional hernia, without obstruction or gangrene    -  1       Follow-ups after your visit        Your next 10 appointments already scheduled     Aug 14, 2017   Procedure with John Lindsey MD   [facility] PeriOP Services (--)    [address]  [facility] [address]   [phone]            Aug 15, 2017  3:15 PM CDT   Post-Op with John Lindsey MD   Eye Clinic ([facility])    [address]  [address] [place] [address]   [phone]            Aug 22, 2017  3:15 PM CDT   Post-Op with John Lindsey MD   Eye Clinic ([facility])    [address]  [address] [place] [address]   [phone]              Future tests that were ordered for you today     Open Future Orders        Priority Expected Expires Ordered    CT Abdomen w Contrast Routine 8/3/2017 8/3/2018 8/3/2017            Who to contact     If you have questions or need follow up information about today's clinic visit or your schedule please contact [facility] directly at [phone].  Normal or non-critical lab and imaging results will be communicated to you by MyChart, letter or phone within 4 business days after the clinic has received the results. If you do not hear from us within 7 days, please contact the clinic through MyChart or phone. If you have a critical or abnormal lab result, we will notify you by phone as soon as possible.  Submit refill requests through [employer] or call your pharmacy and they will forward the refill request to us. Please allow 3  "business days for your refill to be completed.          Additional Information About Your Visit        MyChart Information     Smarty Ants lets you send messages to your doctor, view your test results, renew your prescriptions, schedule appointments and more. To sign up, go to [email or web address] . Click on \"Log in\" on the left side of the screen, which will take you to the Welcome page. Then click on \"Sign up Now\" on the right side of the page.     You will be asked to enter the access code listed below, as well as some personal information. Please follow the directions to create your username and password.     Your access code is: [email or web address]  Expires: 10/26/2017 11:01 AM     Your access code will  in 90 days. If you need help or a new code, please call your [place] clinic or [phone].        Care EveryWhere ID     This is your Care EveryWhere ID. This could be used by other organizations to access your [place] medical records  [id number]        Your Vitals Were     Pulse Height Pulse Oximetry BMI (Body Mass Index)          64 6' 1\" (1.854 m) 97% 41.82 kg/m2         Blood Pressure from Last 3 Encounters:   17 122/88   17 111/77   04/15/17 (!) 159/107    Weight from Last 3 Encounters:   17 (!) 317 lb (143.8 kg)   17 (!) 317 lb (143.8 kg)   12 (!) 313 lb 0.9 oz (142 kg)              Today, you had the following     No orders found for display       Primary Care Provider Office Phone # Fax #    Stevie Krueger [phone]       [facility] [address]  [facility] [address]        Equal Access to Services     MUKUND AGARWAL : Padmini Liu, loren greenberg, serafin russell, karen okeefe. So [facility] [phone].    ATENCIÓN: Si habla español, tiene a martinez disposición servicios gratuitos de asistencia lingüística. Will marques [phone].    We comply with applicable federal civil rights " laws and [place] laws. We do not discriminate on the basis of race, color, national origin, age, disability sex, sexual orientation or gender identity.            Thank you!     Thank you for choosing [facility]  for your care. Our goal is always to provide you with excellent care. Hearing back from our patients is one way we can continue to improve our services. Please take a few minutes to complete the written survey that you may receive in the mail after your visit with us. Thank you!             Your Updated Medication List - Protect others around you: Learn how to safely use, store and throw away your medicines at www.disposemymeds.org.          This list is accurate as of: 8/3/17  3:39 PM.  Always use your most recent med list.                   Brand Name Dispense Instructions for use Diagnosis    acetaminophen 325 MG tablet    TYLENOL    250 tablet    Take 2 tablets by mouth every 6 hours as needed. Pain.    Upper GI bleed       calcium + D 600-200 MG-UNIT Tabs   Generic drug:  calcium carbonate-vitamin D      Take 1 tablet by mouth daily.        CELEBREX PO      Take 200 mg by mouth daily        FLUOXETINE HCL PO      Take 60 mg by mouth daily        lisinopril-hydrochlorothiazide 20-12.5 MG per tablet    PRINZIDE/ZESTORETIC     Take 1 tablet by mouth 2 times daily        metoprolol 200 MG 24 hr tablet    TOPROL-XL     Take 200 mg by mouth daily        NIFEDIPINE PO      Take 90 mg by mouth daily        ofloxacin 0.3 % ophthalmic solution    OCUFLOX    1 Bottle    Place 1 drop into the right eye 4 times daily    Dislocated intraocular lens, sequela       pantoprazole 40 MG EC tablet    PROTONIX    60 tablet    Take 1 tablet by mouth 2 times daily.    Upper GI bleed       * prednisoLONE acetate 1 % ophthalmic susp    PRED FORTE    1 Bottle    Place 1 drop into the right eye 4 times daily    Dislocated intraocular lens, sequela       * prednisoLONE acetate 1 % ophthalmic susp    PRED  FORTE    15 mL    Place 1 drop into the right eye 4 times daily    Aftercare following surgery of a sensory organ       tamsulosin 0.4 MG capsule    FLOMAX     Take by mouth daily        VITAMIN B-12 PO           VITAMIN C PO      Take 500 mg by mouth.        ZOLPIDEM TARTRATE PO      Take 10 mg by mouth At Bedtime        * Notice:  This list has 2 medication(s) that are the same as other medications prescribed for you. Read the directions carefully, and ask your doctor or other care provider to review them with you.

## 2017-08-03 NOTE — LETTER
August 3, 2017      RE:  Leodan Yanez-:  [birth year]    Leodan is a 66 year old White male who presents for hernia evaluation. The patient has noticed a bulge. Pain has been present.  Symptoms began 7 years ago.  Symptoms are described as aching, burning and pressure  located in the upper abdomen.  Associated with this  is none.  Pt has had previous ABD surgery including gastric bypass.  Patient does report that increased activity/lifting causes pain. Employment does not require lifting.     ConstipationNo  DysuriaYes, on Flomax  CoughNo but on CPAP  SmokingNo  DiabetesYes, resolved with weight loss after bypass     Pt's chart has been reviewed for PMH, PSH, allergies, medications and social history.     ROS:  Pulm:  No shortness of breath, dyspnea on exertion, cough, or hemoptysis  CV:  negative  ABD:  See chief complaint  :  negative     Physical exam:  Patient able to get up on table with significant difficulty.  Head eyes, nose and mouth within normal limits.  Sclera are clear  No supraclavicular or cervical adenopathy noted.  Neck shows no gross mass  Respirations are regular and non labored  Abdomen is abdomen is soft without significant tenderness, masses, organomegaly or guarding  bowel sounds are positive and no caput medusa noted.  Hernia  is present in the epigastrium.  Somewhat difficult to palpate due to his persistent obesity.  The defect appears to be about 15 cm across however.     Imaging  CT No        Assesment: upper ventral wall incisional hernia     Plan: Discussed observation, external support, possible progression, incarceration and strangulation signs and symptoms and need for immediate treatment if they develop.  Discussed surgery in detail, including risk, benefits, complications, incision/cosmetics, mesh, infection (possibly requiring removal of the mesh), chronic pain, involvement of inta-abdominal organs, lifting and activity limits after surgery. Gave literature to review.  We  discussed the possible need for a component separation and increased complications and healing required in this situation.  We will perform a CT scan for measurement of the defect and planning for surgery, to determine whether component separation will be required.     Cayetano Vivar MD

## 2017-08-03 NOTE — PROGRESS NOTES
Leodan is a 66 year old White male who presents for hernia evaluation. The patient has noticed a bulge. Pain has been present.  Symptoms began 7 years ago.  Symptoms are described as aching, burning and pressure  located in the upper abdomen.  Associated with this  is none.  Pt has had previous ABD surgery including gastric bypass.  Patient does report that increased activity/lifting causes pain. Employment does not require lifting.    ConstipationNo  DysuriaYes, on Flomax  CoughNo but on CPAP  SmokingNo  DiabetesYes, resolved with weight loss after bypass    Pt's chart has been reviewed for PMH, PSH, allergies, medications and social history.    ROS:  Pulm:  No shortness of breath, dyspnea on exertion, cough, or hemoptysis  CV:  negative  ABD:  See chief complaint  :  negative    Physical exam:  Patient able to get up on table with significant difficulty.  Head eyes, nose and mouth within normal limits.  Sclera are clear  No supraclavicular or cervical adenopathy noted.  Neck shows no gross mass  Respirations are regular and non labored  Abdomen is abdomen is soft without significant tenderness, masses, organomegaly or guarding  bowel sounds are positive and no caput medusa noted.  Hernia  is present in the epigastrium.  Somewhat difficult to palpate due to his persistent obesity.  The defect appears to be about 15 cm across however.    Imaging  CT No      Assesment: upper ventral wall incisional hernia    Plan: Discussed observation, external support, possible progression, incarceration and strangulation signs and symptoms and need for immediate treatment if they develop.  Discussed surgery in detail, including risk, benefits, complications, incision/cosmetics, mesh, infection (possibly requiring removal of the mesh), chronic pain, involvement of inta-abdominal organs, lifting and activity limits after surgery. Gave literature to review.  We discussed the possible need for a component separation and increased  complications and healing required in this situation.  We will perform a CT scan for measurement of the defect and planning for surgery, to determine whether component separation will be required.  Time spent with the patient with greater that 50% of the time in discussion was 20-30 minutes.    Cayetano Vivar MD  8/3/2017 3:34 PM    Please route or send letter to:  Primary Care Provider (PCP) and Include Progress Note

## 2017-08-07 ENCOUNTER — HOSPITAL ENCOUNTER (OUTPATIENT)
Dept: CT IMAGING | Facility: CLINIC | Age: 67
Discharge: HOME | End: 2017-08-07
Attending: SURGERY | Admitting: SURGERY
Payer: COMMERCIAL

## 2017-08-07 DIAGNOSIS — K43.2 INCISIONAL HERNIA: ICD-10-CM

## 2017-08-07 PROCEDURE — 25000128 H RX IP 250 OP 636: Performed by: RADIOLOGY

## 2017-08-07 PROCEDURE — 74160 CT ABDOMEN W/CONTRAST: CPT

## 2017-08-07 RX ORDER — IOPAMIDOL 755 MG/ML
500 INJECTION, SOLUTION INTRAVASCULAR ONCE
Status: COMPLETED | OUTPATIENT
Start: 2017-08-07 | End: 2017-08-07

## 2017-08-07 RX ADMIN — SODIUM CHLORIDE 55 ML: 9 INJECTION, SOLUTION INTRAVENOUS at 15:17

## 2017-08-07 RX ADMIN — IOPAMIDOL 100 ML: 755 INJECTION, SOLUTION INTRAVENOUS at 15:17

## 2017-08-11 RX ORDER — ASPIRIN 81 MG/1
81 TABLET, CHEWABLE ORAL DAILY
COMMUNITY

## 2017-08-11 RX ORDER — MULTIVIT-MIN/IRON/FOLIC ACID/K 18-600-40
1 CAPSULE ORAL DAILY
COMMUNITY
End: 2017-11-07

## 2017-08-13 ENCOUNTER — ANESTHESIA EVENT (OUTPATIENT)
Dept: SURGERY | Facility: CLINIC | Age: 67
End: 2017-08-13
Payer: COMMERCIAL

## 2017-08-14 ENCOUNTER — HOSPITAL ENCOUNTER (OUTPATIENT)
Facility: CLINIC | Age: 67
Discharge: HOME | End: 2017-08-14
Attending: OPHTHALMOLOGY | Admitting: OPHTHALMOLOGY
Payer: COMMERCIAL

## 2017-08-14 ENCOUNTER — SURGERY (OUTPATIENT)
Age: 67
End: 2017-08-14

## 2017-08-14 ENCOUNTER — ANESTHESIA (OUTPATIENT)
Dept: SURGERY | Facility: CLINIC | Age: 67
End: 2017-08-14
Payer: COMMERCIAL

## 2017-08-14 VITALS
OXYGEN SATURATION: 98 % | SYSTOLIC BLOOD PRESSURE: 121 MMHG | RESPIRATION RATE: 14 BRPM | BODY MASS INDEX: 41.75 KG/M2 | TEMPERATURE: 96.5 F | HEIGHT: 73 IN | DIASTOLIC BLOOD PRESSURE: 82 MMHG | WEIGHT: 315 LBS

## 2017-08-14 DIAGNOSIS — H27.121 DISLOCATED IOL (INTRAOCULAR LENS), ANTERIOR, RIGHT: ICD-10-CM

## 2017-08-14 DIAGNOSIS — K92.2 ACUTE GI BLEEDING: Primary | ICD-10-CM

## 2017-08-14 PROCEDURE — 27211024 ZZHC OR SUPPLY OTHER OPNP: Performed by: OPHTHALMOLOGY

## 2017-08-14 PROCEDURE — 40000170 ZZH STATISTIC PRE-PROCEDURE ASSESSMENT II: Performed by: OPHTHALMOLOGY

## 2017-08-14 PROCEDURE — 25000125 ZZHC RX 250: Performed by: ANESTHESIOLOGY

## 2017-08-14 PROCEDURE — 36000101 ZZH EYE SURGERY LEVEL 3 1ST 30 MIN: Performed by: OPHTHALMOLOGY

## 2017-08-14 PROCEDURE — 25000125 ZZHC RX 250: Performed by: OPHTHALMOLOGY

## 2017-08-14 PROCEDURE — 27210794 ZZH OR GENERAL SUPPLY STERILE: Performed by: OPHTHALMOLOGY

## 2017-08-14 PROCEDURE — 25000128 H RX IP 250 OP 636: Performed by: OPHTHALMOLOGY

## 2017-08-14 PROCEDURE — 37000008 ZZH ANESTHESIA TECHNICAL FEE, 1ST 30 MIN: Performed by: OPHTHALMOLOGY

## 2017-08-14 PROCEDURE — 37000009 ZZH ANESTHESIA TECHNICAL FEE, EACH ADDTL 15 MIN: Performed by: OPHTHALMOLOGY

## 2017-08-14 PROCEDURE — 25000128 H RX IP 250 OP 636: Performed by: ANESTHESIOLOGY

## 2017-08-14 PROCEDURE — 25000128 H RX IP 250 OP 636: Performed by: NURSE ANESTHETIST, CERTIFIED REGISTERED

## 2017-08-14 PROCEDURE — S0020 INJECTION, BUPIVICAINE HYDRO: HCPCS | Performed by: OPHTHALMOLOGY

## 2017-08-14 PROCEDURE — 36000102 ZZH EYE SURGERY LEVEL 3 EA 15 ADDTL MIN: Performed by: OPHTHALMOLOGY

## 2017-08-14 PROCEDURE — 25000125 ZZHC RX 250: Performed by: NURSE ANESTHETIST, CERTIFIED REGISTERED

## 2017-08-14 PROCEDURE — V2632 POST CHMBR INTRAOCULAR LENS: HCPCS | Performed by: OPHTHALMOLOGY

## 2017-08-14 PROCEDURE — 71000028 ZZH EYE RECOVERY PHASE 2 EACH 15 MINS: Performed by: OPHTHALMOLOGY

## 2017-08-14 RX ORDER — LIDOCAINE HYDROCHLORIDE 20 MG/ML
INJECTION, SOLUTION INFILTRATION; PERINEURAL PRN
Status: DISCONTINUED | OUTPATIENT
Start: 2017-08-14 | End: 2017-08-14

## 2017-08-14 RX ORDER — CYCLOPENTOLATE HYDROCHLORIDE 10 MG/ML
1 SOLUTION/ DROPS OPHTHALMIC
Status: COMPLETED | OUTPATIENT
Start: 2017-08-14 | End: 2017-08-14

## 2017-08-14 RX ORDER — BALANCED SALT SOLUTION 6.4; .75; .48; .3; 3.9; 1.7 MG/ML; MG/ML; MG/ML; MG/ML; MG/ML; MG/ML
SOLUTION OPHTHALMIC PRN
Status: DISCONTINUED | OUTPATIENT
Start: 2017-08-14 | End: 2017-08-14 | Stop reason: HOSPADM

## 2017-08-14 RX ORDER — PROPARACAINE HYDROCHLORIDE 5 MG/ML
SOLUTION/ DROPS OPHTHALMIC PRN
Status: DISCONTINUED | OUTPATIENT
Start: 2017-08-14 | End: 2017-08-14 | Stop reason: HOSPADM

## 2017-08-14 RX ORDER — OFLOXACIN 3 MG/ML
1 SOLUTION/ DROPS OPHTHALMIC 4 TIMES DAILY
Qty: 1 BOTTLE | Refills: 1 | Status: SHIPPED | OUTPATIENT
Start: 2017-08-14 | End: 2017-08-22

## 2017-08-14 RX ORDER — PROPARACAINE HYDROCHLORIDE 5 MG/ML
1 SOLUTION/ DROPS OPHTHALMIC ONCE
Status: COMPLETED | OUTPATIENT
Start: 2017-08-14 | End: 2017-08-14

## 2017-08-14 RX ORDER — PHENYLEPHRINE HYDROCHLORIDE 25 MG/ML
1 SOLUTION/ DROPS OPHTHALMIC
Status: COMPLETED | OUTPATIENT
Start: 2017-08-14 | End: 2017-08-14

## 2017-08-14 RX ORDER — PROPOFOL 10 MG/ML
INJECTION, EMULSION INTRAVENOUS PRN
Status: DISCONTINUED | OUTPATIENT
Start: 2017-08-14 | End: 2017-08-14

## 2017-08-14 RX ORDER — PREDNISOLONE ACETATE 10 MG/ML
1 SUSPENSION/ DROPS OPHTHALMIC 4 TIMES DAILY
Qty: 1 BOTTLE | Refills: 1 | Status: SHIPPED | OUTPATIENT
Start: 2017-08-14 | End: 2017-08-22

## 2017-08-14 RX ORDER — KETOROLAC TROMETHAMINE 5 MG/ML
1 SOLUTION OPHTHALMIC 4 TIMES DAILY
Qty: 1 BOTTLE | Refills: 1 | Status: SHIPPED | OUTPATIENT
Start: 2017-08-14 | End: 2017-11-07

## 2017-08-14 RX ORDER — BUPIVACAINE HYDROCHLORIDE 7.5 MG/ML
5 INJECTION, SOLUTION EPIDURAL; RETROBULBAR ONCE
Status: DISCONTINUED | OUTPATIENT
Start: 2017-08-14 | End: 2017-08-14 | Stop reason: HOSPADM

## 2017-08-14 RX ORDER — SODIUM CHLORIDE, SODIUM LACTATE, POTASSIUM CHLORIDE, CALCIUM CHLORIDE 600; 310; 30; 20 MG/100ML; MG/100ML; MG/100ML; MG/100ML
INJECTION, SOLUTION INTRAVENOUS CONTINUOUS
Status: DISCONTINUED | OUTPATIENT
Start: 2017-08-14 | End: 2017-08-14 | Stop reason: HOSPADM

## 2017-08-14 RX ORDER — TROPICAMIDE 10 MG/ML
1 SOLUTION/ DROPS OPHTHALMIC
Status: COMPLETED | OUTPATIENT
Start: 2017-08-14 | End: 2017-08-14

## 2017-08-14 RX ORDER — ONDANSETRON 2 MG/ML
INJECTION INTRAMUSCULAR; INTRAVENOUS PRN
Status: DISCONTINUED | OUTPATIENT
Start: 2017-08-14 | End: 2017-08-14

## 2017-08-14 RX ORDER — FENTANYL CITRATE 50 UG/ML
INJECTION, SOLUTION INTRAMUSCULAR; INTRAVENOUS PRN
Status: DISCONTINUED | OUTPATIENT
Start: 2017-08-14 | End: 2017-08-14

## 2017-08-14 RX ADMIN — DEXAMETHASONE SODIUM PHOSPHATE 1.25 ML GIVEN: 10 INJECTION, SOLUTION INTRAMUSCULAR; INTRAVENOUS at 16:51

## 2017-08-14 RX ADMIN — Medication 0.5 ML: at 16:21

## 2017-08-14 RX ADMIN — FENTANYL CITRATE 50 MCG: 50 INJECTION, SOLUTION INTRAMUSCULAR; INTRAVENOUS at 15:02

## 2017-08-14 RX ADMIN — TROPICAMIDE 1 DROP: 10 SOLUTION/ DROPS OPHTHALMIC at 13:04

## 2017-08-14 RX ADMIN — CYCLOPENTOLATE HYDROCHLORIDE 1 DROP: 10 SOLUTION/ DROPS OPHTHALMIC at 12:53

## 2017-08-14 RX ADMIN — TROPICAMIDE 1 DROP: 10 SOLUTION/ DROPS OPHTHALMIC at 12:53

## 2017-08-14 RX ADMIN — LIDOCAINE HYDROCHLORIDE 40 MG: 20 INJECTION, SOLUTION INFILTRATION; PERINEURAL at 15:05

## 2017-08-14 RX ADMIN — PHENYLEPHRINE HYDROCHLORIDE 1 DROP: 2.5 SOLUTION/ DROPS OPHTHALMIC at 12:59

## 2017-08-14 RX ADMIN — ONDANSETRON 4 MG: 2 INJECTION INTRAMUSCULAR; INTRAVENOUS at 15:06

## 2017-08-14 RX ADMIN — CYCLOPENTOLATE HYDROCHLORIDE 1 DROP: 10 SOLUTION/ DROPS OPHTHALMIC at 12:59

## 2017-08-14 RX ADMIN — BALANCED SALT SOLUTION 15 ML: 6.4; .75; .48; .3; 3.9; 1.7 SOLUTION OPHTHALMIC at 15:43

## 2017-08-14 RX ADMIN — PHENYLEPHRINE HYDROCHLORIDE 1 DROP: 2.5 SOLUTION/ DROPS OPHTHALMIC at 12:53

## 2017-08-14 RX ADMIN — PHENYLEPHRINE HYDROCHLORIDE 1 DROP: 2.5 SOLUTION/ DROPS OPHTHALMIC at 13:04

## 2017-08-14 RX ADMIN — FENTANYL CITRATE 25 MCG: 50 INJECTION, SOLUTION INTRAMUSCULAR; INTRAVENOUS at 15:47

## 2017-08-14 RX ADMIN — PROPARACAINE HYDROCHLORIDE 1 DROP: 5 SOLUTION/ DROPS OPHTHALMIC at 12:53

## 2017-08-14 RX ADMIN — ACETYLCHOLINE CHLORIDE 5 MG: KIT at 15:45

## 2017-08-14 RX ADMIN — LIDOCAINE HYDROCHLORIDE 6 ML: 20 INJECTION, SOLUTION EPIDURAL; INFILTRATION; INTRACAUDAL; PERINEURAL at 15:47

## 2017-08-14 RX ADMIN — PROPARACAINE HYDROCHLORIDE 2 DROP: 5 SOLUTION/ DROPS OPHTHALMIC at 15:46

## 2017-08-14 RX ADMIN — SODIUM CHLORIDE, POTASSIUM CHLORIDE, SODIUM LACTATE AND CALCIUM CHLORIDE: 600; 310; 30; 20 INJECTION, SOLUTION INTRAVENOUS at 13:05

## 2017-08-14 RX ADMIN — CYCLOPENTOLATE HYDROCHLORIDE 1 DROP: 10 SOLUTION/ DROPS OPHTHALMIC at 13:04

## 2017-08-14 RX ADMIN — FENTANYL CITRATE 25 MCG: 50 INJECTION, SOLUTION INTRAMUSCULAR; INTRAVENOUS at 15:40

## 2017-08-14 RX ADMIN — MIDAZOLAM HYDROCHLORIDE 1 MG: 1 INJECTION, SOLUTION INTRAMUSCULAR; INTRAVENOUS at 15:00

## 2017-08-14 RX ADMIN — PROPOFOL 50 MG: 10 INJECTION, EMULSION INTRAVENOUS at 15:03

## 2017-08-14 RX ADMIN — MIDAZOLAM HYDROCHLORIDE 1 MG: 1 INJECTION, SOLUTION INTRAMUSCULAR; INTRAVENOUS at 15:04

## 2017-08-14 RX ADMIN — LIDOCAINE HYDROCHLORIDE 1 ML: 10 INJECTION, SOLUTION EPIDURAL; INFILTRATION; INTRACAUDAL; PERINEURAL at 13:05

## 2017-08-14 RX ADMIN — PROPOFOL 20 MG: 10 INJECTION, EMULSION INTRAVENOUS at 15:04

## 2017-08-14 RX ADMIN — NEOMYCIN SULFATE, POLYMYXIN B SULFATE, AND DEXAMETHASONE 1 TUBE: 3.5; 10000; 1 OINTMENT OPHTHALMIC at 16:51

## 2017-08-14 RX ADMIN — BALANCED SALT SOLUTION 500 ML: 6.4; .75; .48; .3; 3.9; 1.7 SOLUTION OPHTHALMIC at 15:45

## 2017-08-14 RX ADMIN — TROPICAMIDE 1 DROP: 10 SOLUTION/ DROPS OPHTHALMIC at 13:00

## 2017-08-14 RX ADMIN — EPINEPHRINE 500 ML: 1 INJECTION, SOLUTION, CONCENTRATE INTRAVENOUS at 15:44

## 2017-08-14 ASSESSMENT — LIFESTYLE VARIABLES: TOBACCO_USE: 0

## 2017-08-14 ASSESSMENT — ENCOUNTER SYMPTOMS: DYSRHYTHMIAS: 1

## 2017-08-14 NOTE — ANESTHESIA PREPROCEDURE EVALUATION
Anesthesia Evaluation     . Pt has had prior anesthetic.     No history of anesthetic complications          ROS/MED HX    ENT/Pulmonary:     (+)sleep apnea, , . .   (-) tobacco use   Neurologic:       Cardiovascular:     (+) Dyslipidemia, hypertension----. : . . . :. dysrhythmias a-fib, .       METS/Exercise Tolerance:     Hematologic:         Musculoskeletal:         GI/Hepatic:     (+) GERD Asymptomatic on medication,       Renal/Genitourinary:         Endo:         Psychiatric:         Infectious Disease:         Malignancy:         Other:                     Physical Exam  Normal systems: pulmonary    Airway   Mallampati: II  TM distance: >3 FB  Neck ROM: full    Dental   (+) upper dentures    Cardiovascular   Rhythm and rate: regular and normal      Pulmonary                     Anesthesia Plan      History & Physical Review  History and physical reviewed and following examination; no interval change.    ASA Status:  3 .    NPO Status:  > 8 hours    Plan for MAC          Postoperative Care      Consents  Anesthetic plan, risks, benefits and alternatives discussed with:  Patient..                          .

## 2017-08-14 NOTE — DISCHARGE INSTRUCTIONS
[facility] Anesthesia [facility] Discharge  Instructions  Anesthesia (Eye Care Center)   Adult Discharge Instructions    For 24 hours after surgery    1. Get plenty of rest.  Make arrangements to have a responsible adult stay with you for at least 6 hours after you leave the hospital.  2. Do not drive or use heavy equipment for 24 hours.    3. Do not drink alcohol for 24 hours.  4. Do not sign legal documents or make important decisions for 24 hours.  5. Avoid strenuous or risky activities. You may feel lightheaded.  If so, sit for a few minutes before standing.  Have someone help you get up.   6. Conscious sedation patients may resume a regular diet..  7. Any questions of medical nature, call your physician.    [facility]  Post Operative Cataract Instructions    Keep the eye shield over the operated eye tonight and every night for 1 week.    You do not have to start taking eye drops today. Keep the patch on the eye until your follow-up appointment tomorrow.    No heavy lifting, no bending down at the waist, no water in the eye.    Take tylenol as directed on the bottle if you have pain.    Please call [phone] and wait for the prompts to reach the on-call doctor if you develop severe pain, decreased vision, redness, or severe sensitivity to light.    Bring your eye drops to your appointment tomorrow.    You have a follow-up appointment with your doctor tomorrow at the [facility] Eye clinic.    Your scripts were filled at [facility] in [place].  You will need these before your appointment tomorrow and bring them to your appointment.

## 2017-08-14 NOTE — OP NOTE
OPHTHALMOLOGY OPERATIVE REPORT    PATIENT NAME: Leodan Yanez   DATE: 8/14/2017     SURGEON: John Lindsey MD  ASSISTANT: Lesli AWAD    PREOP DIAGNOSIS: dislocated intraocular lens, right eye  POSTOP DIAGNOSIS: same    ANESTHESIA: MAC retrobulbar  COMPLICATIONS: none    LENS SPECIFICATIONS:  MA60MA 1.0 Diopters    INDICATION FOR PROCEDURE  The patient has a history of dislocated scleral fixated intraocular lens. The risks including, but not limited to infection, loss of vision, loss of eye, need for more surgery, and bleeding, along with the benefits, alternatives, expectations, and the procedure itself were discussed at length with the patient who wished to proceed with surgery.    DESCRIPTION OF PROCEDURE  In the preoperative suite, the patient was identified, the surgical site marked and informed consent was obtained. The patient was then brought back to the operative suite where the appropriate anesthesia monitors were connected. The patient's eye was prepped and draped in the usual sterile fashion for ophthalmic surgery.    Retrobulbar block was administered.      A paracentesis incision was made. Aqueous was replaced with viscoat in the anterior chamber. A 2.8 mm keratome was used to make a superonasal, triplanar clear corneal incision of approximately 3.5mm. Small peritomy was made nasally and temporally to better visualize the haptic ends.    The displaced intraocular lens was grasped with duvet forceps and explanted through the keratome wound. The haptics were pulled into anterior chamber prior to explantation.     The conjunctiva was marked nasally and temporally 2mm posterior to the limbus (1 clock hour clockwise from prior site), 90 degrees away from the superior corneal wound.      AC maintainer was placed. The new 3-piece intraocular lens was injected with the leading haptic into the inferior angle.  A 30g thin walled needle was passed transconjunctivally and through the sclera at the  previously made marks 2mm from the nasal limbus, creating an oblique scleral tunnel.  The leading haptic was grasped with intraocular forceps and fed into the 30g needle ab interno.  A second 30g needle was passed temporally in opposite fashion, and attempt was made to dock the trailing haptic within the needle lumen. The end of trailing haptic broke. In a very long eye, decision to suture the IOL to iris was made.    10-0 polypropylene curved needle was used to suture the haptic to iris superiorly and inferiorly using a Siepser sliding knot. AC maintainer was removed. Miochol was injected. IOL was well centered. Wounds were hydrated an were noticed to be water-tight. Conjunctival wound was closed with one 8-0 vicryl suture nasally and temporally.    The intraocular lens was noted to be well centered and planar with a round and regular pupil.      Subconjunctival ancef / decadron / lidocaine was injected inferiorly.      A patch and shield were taped over the eye. The patient was then taken to the recovery room in stable condition having tolerated the procedure well and discharged home in good condition.    Dr. John Lindsey was scrubbed and present for the entire case.

## 2017-08-14 NOTE — ANESTHESIA POSTPROCEDURE EVALUATION
Patient: Leodan Yanez    Procedure(s):  RIGHT INTRAOCULAR LENS EXPLANT WITH SECONDARY  INTRAOCULAR LENS IMPLANT WITH  IRIS  FIXATION - Wound Class: I-Clean    Diagnosis:DISLOCATED IOL  Diagnosis Additional Information: No value filed.    Anesthesia Type:  MAC    Note:  Anesthesia Post Evaluation    Patient location during evaluation: PACU  Patient participation: Able to fully participate in evaluation  Level of consciousness: awake and alert  Pain management: adequate  Cardiovascular status: acceptable  Respiratory status: acceptable  Hydration status: acceptable  PONV: none     Anesthetic complications: None          Last vitals:  Vitals:    08/14/17 1301 08/14/17 1700   BP: 130/90 (!) 128/92   Resp: 16 12   Temp: 35.8  C (96.5  F)    SpO2: 98% 97%         Electronically Signed By: MARIA ESTHER ADAMS  August 14, 2017  5:10 PM

## 2017-08-14 NOTE — IP AVS SNAPSHOT
[facility]    [address]    [place] [address]    Phone:  [phone]                                       After Visit Summary   8/14/2017    Leodan Yanez    MRN: [id number]           After Visit Summary Signature Page     I have received my discharge instructions, and my questions have been answered. I have discussed any challenges I see with this plan with the nurse or doctor.    ..........................................................................................................................................  Patient/Patient Representative Signature      ..........................................................................................................................................  Patient Representative Print Name and Relationship to Patient    ..................................................               ................................................  Date                                            Time    ..........................................................................................................................................  Reviewed by Signature/Title    ...................................................              ..............................................  Date                                                            Time

## 2017-08-14 NOTE — IP AVS SNAPSHOT
MRN:[id number]                      After Visit Summary   8/14/2017    Leodan Yanez    MRN: [id number]           Thank you!     Thank you for choosing [facility] for your care. Our goal is always to provide you with excellent care. Hearing back from our patients is one way we can continue to improve our services. Please take a few minutes to complete the written survey that you may receive in the mail after you visit with us. Thank you!        Patient Information     Date Of Birth          1950        About your hospital stay     You were admitted on:  August 14, 2017 You last received care in the:  [facility] [place]    You were discharged on:  August 14, 2017       Who to Call     For medical emergencies, please call 911.  For non-urgent questions about your medical care, please call your primary care provider or clinic, [phone]  For questions related to your surgery, please call your surgery clinic        Attending Provider     Provider Specialty    Rosalia, John Cano MD Ophthalmology       Primary Care Provider Office Phone # Fax #    Stevie Krueger [phone]      Your next 10 appointments already scheduled     Aug 15, 2017  3:15 PM CDT   Post-Op with John Lindsey MD   Eye Clinic ([facility])    [address] [address]  [address] [place] [id number]   [phone]            Aug 22, 2017  3:15 PM CDT   Post-Op with John Lindsey MD   Eye Clinic ([facility])    [address]  [address] [facility]  [address] [address]  [facility] [address]   [phone]              Further instructions from your care team       [facility] Anesthesia [facility] Discharge  Instructions  Anesthesia (Eye Care Center)   Adult Discharge Instructions    For 24 hours after surgery    1. Get plenty of rest.  Make arrangements to have a responsible adult stay with you for at least 6 hours after you leave  "the hospital.  2. Do not drive or use heavy equipment for 24 hours.    3. Do not drink alcohol for 24 hours.  4. Do not sign legal documents or make important decisions for 24 hours.  5. Avoid strenuous or risky activities. You may feel lightheaded.  If so, sit for a few minutes before standing.  Have someone help you get up.   6. Conscious sedation patients may resume a regular diet..  7. Any questions of medical nature, call your physician.    [facility]  Post Operative Cataract Instructions    Keep the eye shield over the operated eye tonight and every night for 1 week.    You do not have to start taking eye drops today. Keep the patch on the eye until your follow-up appointment tomorrow.    No heavy lifting, no bending down at the waist, no water in the eye.    Take tylenol as directed on the bottle if you have pain.    Please call [phone] and wait for the prompts to reach the on-call doctor if you develop severe pain, decreased vision, redness, or severe sensitivity to light.    Bring your eye drops to your appointment tomorrow.    You have a follow-up appointment with your doctor tomorrow at the [facility] clinic.    Your scripts were filled at [facility] in [place].  You will need these before your appointment tomorrow and bring them to your appointment.        Pending Results     No orders found from 8/12/2017 to 8/15/2017.            Admission Information     Date & Time Provider Department Dept. Phone    8/14/2017 Page, John Cano MD [facility] [place] [phone]      Your Vitals Were     Blood Pressure Temperature Respirations Height Weight Pulse Oximetry    130/90 (Cuff Size: Adult Regular) 96.5  F (35.8  C) (Temporal) 16 1.854 m (6' 1\") 144.2 kg (318 lb) 98%    BMI (Body Mass Index)                   41.96 kg/m2           MyChart Information     [employer] lets you send messages to your doctor, view your test results, renew your prescriptions, schedule " "appointments and more. To sign up, go to [email or web address] . Click on \"Log in\" on the left side of the screen, which will take you to the Welcome page. Then click on \"Sign up Now\" on the right side of the page.     You will be asked to enter the access code listed below, as well as some personal information. Please follow the directions to create your username and password.     Your access code is: [email or web address]  Expires: 10/26/2017 11:01 AM     Your access code will  in 90 days. If you need help or a new code, please call your [facility] [place] clinic or [phone].        Care EveryWhere ID     This is your Care EveryWhere ID. This could be used by other organizations to access your [facility] medical records  [id number]        Equal Access to Services     MUKUND AGARWAL : Padmini Liu, loren greenberg, serafin russell, karen escobar . So [facility] [phone].    ATENCIÓN: Si habla español, tiene a martinez disposición servicios gratuitos de asistencia lingüística. Will al [phone].    We comply with applicable federal civil rights laws and [place] laws. We do not discriminate on the basis of race, color, national origin, age, disability sex, sexual orientation or gender identity.               Review of your medicines      START taking        Dose / Directions    ketorolac 0.5 % ophthalmic solution   Commonly known as:  ACULAR   Used for:  Dislocated IOL (intraocular lens), anterior, right        Dose:  1 drop   Place 1 drop into the right eye 4 times daily   Quantity:  1 Bottle   Refills:  1         CONTINUE these medicines which may have CHANGED, or have new prescriptions. If we are uncertain of the size of tablets/capsules you have at home, strength may be listed as something that might have changed.        Dose / Directions    prednisoLONE acetate 1 % ophthalmic susp   Commonly known as:  PRED FORTE   This may have changed:  Another medication with the " same name was removed. Continue taking this medication, and follow the directions you see here.   Used for:  Dislocated IOL (intraocular lens), anterior, right        Dose:  1 drop   Place 1 drop into the right eye 4 times daily   Quantity:  1 Bottle   Refills:  1         CONTINUE these medicines which have NOT CHANGED        Dose / Directions    acetaminophen 325 MG tablet   Commonly known as:  TYLENOL   Used for:  Upper GI bleed        Dose:  650 mg   Take 2 tablets by mouth every 6 hours as needed. Pain.   Quantity:  250 tablet   Refills:  0       aspirin 81 MG chewable tablet        Dose:  81 mg   Take 81 mg by mouth daily   Refills:  0       calcium + D 600-200 MG-UNIT Tabs   Generic drug:  calcium carbonate-vitamin D        Dose:  1 tablet   Take 1 tablet by mouth daily.   Refills:  0       CELEBREX PO        Dose:  200 mg   Take 200 mg by mouth daily   Refills:  0       FLUOXETINE HCL PO        Dose:  60 mg   Take 60 mg by mouth daily   Refills:  0       lisinopril-hydrochlorothiazide 20-12.5 MG per tablet   Commonly known as:  PRINZIDE/ZESTORETIC        Dose:  1 tablet   Take 1 tablet by mouth 2 times daily   Refills:  0       metoprolol 200 MG 24 hr tablet   Commonly known as:  TOPROL-XL        Dose:  200 mg   Take 200 mg by mouth daily   Refills:  0       NIFEDIPINE PO        Dose:  90 mg   Take 90 mg by mouth daily   Refills:  0       ofloxacin 0.3 % ophthalmic solution   Commonly known as:  OCUFLOX   Used for:  Dislocated IOL (intraocular lens), anterior, right        Dose:  1 drop   Place 1 drop into the right eye 4 times daily   Quantity:  1 Bottle   Refills:  1       pantoprazole 40 MG EC tablet   Commonly known as:  PROTONIX   Used for:  Upper GI bleed        Dose:  40 mg   Take 1 tablet by mouth 2 times daily.   Quantity:  60 tablet   Refills:  0       tamsulosin 0.4 MG capsule   Commonly known as:  FLOMAX        Take by mouth daily   Refills:  0       VITAMIN B-12 PO        Refills:  0        VITAMIN C PO        Dose:  500 mg   Take 500 mg by mouth.   Refills:  0       vitamin D 2000 UNITS Caps        Dose:  1 tablet   Take 1 tablet by mouth daily   Refills:  0       ZOLPIDEM TARTRATE PO        Dose:  10 mg   Take 10 mg by mouth At Bedtime   Refills:  0            Where to get your medicines      These medications were sent to [facility]/pharmacy #[id number] - [place], [place] - [address] [address] [facility]  [address] [facility] [address]     Phone:  [phone]     ketorolac 0.5 % ophthalmic solution    ofloxacin 0.3 % ophthalmic solution    prednisoLONE acetate 1 % ophthalmic susp                Protect others around you: Learn how to safely use, store and throw away your medicines at www.disposemymeds.org.             Medication List: This is a list of all your medications and when to take them. Check marks below indicate your daily home schedule. Keep this list as a reference.      Medications           Morning Afternoon Evening Bedtime As Needed    acetaminophen 325 MG tablet   Commonly known as:  TYLENOL   Take 2 tablets by mouth every 6 hours as needed. Pain.                                aspirin 81 MG chewable tablet   Take 81 mg by mouth daily                                calcium + D 600-200 MG-UNIT Tabs   Take 1 tablet by mouth daily.   Generic drug:  calcium carbonate-vitamin D                                CELEBREX PO   Take 200 mg by mouth daily                                FLUOXETINE HCL PO   Take 60 mg by mouth daily                                ketorolac 0.5 % ophthalmic solution   Commonly known as:  ACULAR   Place 1 drop into the right eye 4 times daily                                lisinopril-hydrochlorothiazide 20-12.5 MG per tablet   Commonly known as:  PRINZIDE/ZESTORETIC   Take 1 tablet by mouth 2 times daily                                metoprolol 200 MG 24 hr tablet   Commonly known as:  TOPROL-XL   Take 200 mg by mouth daily                                NIFEDIPINE PO   Take 90  mg by mouth daily                                ofloxacin 0.3 % ophthalmic solution   Commonly known as:  OCUFLOX   Place 1 drop into the right eye 4 times daily                                pantoprazole 40 MG EC tablet   Commonly known as:  PROTONIX   Take 1 tablet by mouth 2 times daily.                                prednisoLONE acetate 1 % ophthalmic susp   Commonly known as:  PRED FORTE   Place 1 drop into the right eye 4 times daily                                tamsulosin 0.4 MG capsule   Commonly known as:  FLOMAX   Take by mouth daily                                VITAMIN B-12 PO                                VITAMIN C PO   Take 500 mg by mouth.                                vitamin D 2000 UNITS Caps   Take 1 tablet by mouth daily                                ZOLPIDEM TARTRATE PO   Take 10 mg by mouth At Bedtime

## 2017-08-14 NOTE — ANESTHESIA CARE TRANSFER NOTE
Patient: Leodan Yanez    Procedure(s):  RIGHT INTRAOCULAR LENS EXPLANT WITH SECONDARY  INTRAOCULAR LENS IMPLANT WITH  IRIS  FIXATION - Wound Class: I-Clean    Diagnosis: DISLOCATED IOL  Diagnosis Additional Information: No value filed.    Anesthesia Type:   MAC     Note:  Airway :Room Air  Patient transferred to:PACU  Comments: Transfer of Care Note:    Leodan Yanez    Spontaneous respirations on RA. In PACU. Monitors on. VSS. Report to RN.    8/14/2017    Carmen Martin CRNA          Vitals: (Last set prior to Anesthesia Care Transfer)    CRNA VITALS  8/14/2017 1625 - 8/14/2017 1659      8/14/2017             Resp Rate (set): 10                Electronically Signed By: SAMMI Elmore CRNA  August 14, 2017  4:59 PM

## 2017-08-15 ENCOUNTER — OFFICE VISIT (OUTPATIENT)
Dept: OPHTHALMOLOGY | Facility: CLINIC | Age: 67
End: 2017-08-15
Attending: OPHTHALMOLOGY
Payer: COMMERCIAL

## 2017-08-15 DIAGNOSIS — Z48.810 AFTERCARE FOLLOWING SURGERY OF A SENSORY ORGAN: Primary | ICD-10-CM

## 2017-08-15 DIAGNOSIS — Z86.69 H/O RD (RETINAL DETACHMENT): ICD-10-CM

## 2017-08-15 DIAGNOSIS — T85.22XS DISLOCATED INTRAOCULAR LENS, SEQUELA: ICD-10-CM

## 2017-08-15 PROCEDURE — 99213 OFFICE O/P EST LOW 20 MIN: CPT | Mod: ZF

## 2017-08-15 RX ORDER — SILICONE ADHESIVE 1.5" X 3"
1 SHEET (EA) TOPICAL 4 TIMES DAILY
Qty: 1 BOTTLE | Refills: 3 | Status: SHIPPED | OUTPATIENT
Start: 2017-08-15 | End: 2017-11-07

## 2017-08-15 RX ORDER — SODIUM CHLORIDE 5 %
2 OINTMENT (GRAM) OPHTHALMIC (EYE) AT BEDTIME
Qty: 1 TUBE | Refills: 3 | Status: SHIPPED | OUTPATIENT
Start: 2017-08-15 | End: 2017-11-07

## 2017-08-15 ASSESSMENT — TONOMETRY
OD_IOP_MMHG: 16
IOP_METHOD: ICARE
OS_IOP_MMHG: 14

## 2017-08-15 ASSESSMENT — VISUAL ACUITY
OS_SC: 20/40
OD_SC: HM
OD_PH_SC: 20/400
METHOD: SNELLEN - LINEAR

## 2017-08-15 ASSESSMENT — SLIT LAMP EXAM - LIDS: COMMENTS: NORMAL

## 2017-08-15 NOTE — MR AVS SNAPSHOT
After Visit Summary   8/15/2017    Leodan Yanez    MRN: [id number]           Patient Information     Date Of Birth          1950        Visit Information        Provider Department      8/15/2017 3:15 PM John Lindsey MD [facility]        Today's Diagnoses     Aftercare following surgery of a sensory organ - Right Eye    -  1    Dislocated intraocular lens, sequela - Right Eye        H/O RD (retinal detachment)          Care Instructions    Right eye    Prednisone 1 drop 4x/day    Ocuflox 1 drop 4x/day    Ketorolac 1 drop 4x/day    Srinivasan 1 drop 4x/day    Srinivasan ointment at bedtime    Have 5 minutes between drops, keep eyes closed for 1 minute after the drop    Do not bend over    Do not lift anything more than a gallon    Have shield on while napping and showeing    Avoid swimming pool/bathtub for 2 weeks          Follow-ups after your visit        Follow-up notes from your care team     Return in about 1 week (around 8/22/2017).      Your next 10 appointments already scheduled     Aug 22, 2017  3:15 PM CDT   Post-Op with John Lindsey MD   Eye Clinic ([facility])    [address]res [address]  [address] [place] [address] Clin [address] [place] [id number]   [phone]            Aug 24, 2017  3:00 PM CDT   RETURN RETINA with Rose Weston MD   Eye Clinic ([facility])    [address]  [address] [address]   [phone]              Who to contact     Please call your clinic at [phone] to:    Ask questions about your health    Make or cancel appointments    Discuss your medicines    Learn about your test results    Speak to your doctor   If you have compliments or concerns about an experience at your clinic, or if you wish to file a complaint, please contact [facility] Patient Relations at [phone] or email us at [email or web address]         Additional Information About  Your Visit        NovaTorque Information     NovaTorque is an electronic gateway that provides easy, online access to your medical records. With NovaTorque, you can request a clinic appointment, read your test results, renew a prescription or communicate with your care team.     To sign up for NovaTorque visit the website at [email or web address]   You will be asked to enter the access code listed below, as well as some personal information. Please follow the directions to create your username and password.     Your access code is: [email or web address]  Expires: 10/26/2017 11:01 AM     Your access code will  in 90 days. If you need help or a new code, please contact your [facility] or call [phone] for assistance.        Care EveryWhere ID     This is your Care EveryWhere ID. This could be used by other organizations to access your [place] medical records  [id number]         Blood Pressure from Last 3 Encounters:   17 121/82   17 122/88   17 111/77    Weight from Last 3 Encounters:   17 (!) 144.2 kg (318 lb)   17 (!) 143.8 kg (317 lb)   17 (!) 143.8 kg (317 lb)              Today, you had the following     No orders found for display         Today's Medication Changes          These changes are accurate as of: 8/15/17  4:54 PM.  If you have any questions, ask your nurse or doctor.               Start taking these medicines.        Dose/Directions    * sodium chloride 5 % ophthalmic solution   Commonly known as:  DILLAN 128   Used for:  Aftercare following surgery of a sensory organ   Started by:  John Lindsey MD        Dose:  1 drop   Place 1 drop into the right eye 4 times daily   Quantity:  1 Bottle   Refills:  3       * sodium chloride 5 % ophthalmic ointment   Commonly known as:  DILLAN 128   Used for:  Aftercare following surgery of a sensory organ   Started by:  John Lindsey MD        Dose:  2 Application   Place 2 Application (1 g)  into the right eye At Bedtime   Quantity:  1 Tube   Refills:  3       * sodium chloride 5 % ophthalmic solution   Commonly known as:  DILLAN 128   Used for:  Aftercare following surgery of a sensory organ   Started by:  John Lindsey MD        Dose:  1 drop   Place 1 drop into the right eye 4 times daily   Quantity:  1 Bottle   Refills:  3       * Notice:  This list has 3 medication(s) that are the same as other medications prescribed for you. Read the directions carefully, and ask your doctor or other care provider to review them with you.         Where to get your medicines      These medications were sent to [facility]/pharmacy #[id number] - [place], [place] - [address] [facility]  [address] [facility], [facility] [address]     Phone:  [phone]     sodium chloride 5 % ophthalmic solution         Some of these will need a paper prescription and others can be bought over the counter.  Ask your nurse if you have questions.     Bring a paper prescription for each of these medications     sodium chloride 5 % ophthalmic ointment    sodium chloride 5 % ophthalmic solution                Primary Care Provider Office Phone # Fax #    Stevie Krueger [phone]       [facility] [address] [facility] [address]        Equal Access to Services     BRITTNI [facility] CenterTHIERRY AH: Hadii lg leach hadsimoneo Socolleen, waaxda luqadaha, qaybta kaalmada adeegyada, karen okeefe. So [facility] [phone].    ATENCIÓN: Si habla español, tiene a martinez disposición servicios gratuitos de asistencia lingüística. Llame al [phone].    We comply with applicable federal civil rights laws and [place] laws. We do not discriminate on the basis of race, color, national origin, age, disability sex, sexual orientation or gender identity.            Thank you!     Thank you for choosing EYE CLINIC  for your care. Our goal is always to provide you with excellent care. Hearing back from our patients is one way  we can continue to improve our services. Please take a few minutes to complete the written survey that you may receive in the mail after your visit with us. Thank you!             Your Updated Medication List - Protect others around you: Learn how to safely use, store and throw away your medicines at www.disposemymeds.org.          This list is accurate as of: 8/15/17  4:54 PM.  Always use your most recent med list.                   Brand Name Dispense Instructions for use Diagnosis    acetaminophen 325 MG tablet    TYLENOL    250 tablet    Take 2 tablets by mouth every 6 hours as needed. Pain.    Upper GI bleed       aspirin 81 MG chewable tablet      Take 81 mg by mouth daily        calcium + D 600-200 MG-UNIT Tabs   Generic drug:  calcium carbonate-vitamin D      Take 1 tablet by mouth daily.        CELEBREX PO      Take 200 mg by mouth daily        FLUOXETINE HCL PO      Take 60 mg by mouth daily        ketorolac 0.5 % ophthalmic solution    ACULAR    1 Bottle    Place 1 drop into the right eye 4 times daily    Dislocated IOL (intraocular lens), anterior, right       lisinopril-hydrochlorothiazide 20-12.5 MG per tablet    PRINZIDE/ZESTORETIC     Take 1 tablet by mouth 2 times daily        metoprolol 200 MG 24 hr tablet    TOPROL-XL     Take 200 mg by mouth daily        NIFEDIPINE PO      Take 90 mg by mouth daily        ofloxacin 0.3 % ophthalmic solution    OCUFLOX    1 Bottle    Place 1 drop into the right eye 4 times daily    Dislocated IOL (intraocular lens), anterior, right       pantoprazole 40 MG EC tablet    PROTONIX    60 tablet    Take 1 tablet by mouth 2 times daily.    Upper GI bleed       prednisoLONE acetate 1 % ophthalmic susp    PRED FORTE    1 Bottle    Place 1 drop into the right eye 4 times daily    Dislocated IOL (intraocular lens), anterior, right       * sodium chloride 5 % ophthalmic solution    DILLAN 128    1 Bottle    Place 1 drop into the right eye 4 times daily    Aftercare following  surgery of a sensory organ       * sodium chloride 5 % ophthalmic ointment    DILLAN 128    1 Tube    Place 2 Application (1 g) into the right eye At Bedtime    Aftercare following surgery of a sensory organ       * sodium chloride 5 % ophthalmic solution    DILLAN 128    1 Bottle    Place 1 drop into the right eye 4 times daily    Aftercare following surgery of a sensory organ       tamsulosin 0.4 MG capsule    FLOMAX     Take by mouth daily        VITAMIN B-12 PO           VITAMIN C PO      Take 500 mg by mouth.        vitamin D 2000 UNITS Caps      Take 1 tablet by mouth daily        ZOLPIDEM TARTRATE PO      Take 10 mg by mouth At Bedtime        * Notice:  This list has 3 medication(s) that are the same as other medications prescribed for you. Read the directions carefully, and ask your doctor or other care provider to review them with you.

## 2017-08-15 NOTE — NURSING NOTE
Chief Complaints and History of Present Illnesses   Patient presents with     Follow Up For     PCIOL RE     HPI    Affected eye(s):  Right   Symptoms:     No blurred vision   No decreased vision         Do you have eye pain now?:  No      Comments:  Pt states that vision seems yellow. Vision very fuzzy.  Ivy Antunez COA 2:40 PM August 15, 2017

## 2017-08-15 NOTE — PROGRESS NOTES
POD1 s/p IOL explant, anterior vitrectomy, iris fixated IOL OD (8/14/17)    s/p vitrectomy, intraocular lens repositioning with transcleral fixation right eye 5/12/17    Interval:  Patient reports hazy vision with yellowish hue. He had trouble with CPAP last night. No pain.    Plan:  POD1 s/p IOL explant, anterior vitrectomy, iris fixated IOL OD (8/14/17)  - PH VA 20/400  - IOL well centered, moderate inflammation  - microcystic corneal edema, start asad gtt QID, rhoda QHS  - B scan OD with retina attached, no vitreous hemorrhage  - Start pred forte Q2hr, ocuflox QID, acular QID  - Precautions discussed in detail including shield    RTC in 1 week for f/up with me and retina      Lesli Lerner          ~~~~~~~~~~~~~~~~~~~~~~~~~~~~~~~~~~~~~~~~~~~~~~~~~~~~~~~~~~~~~~~~    Complete documentation of historical and exam elements from today's encounter can be found in the full encounter summary report (not reduplicated in this progress note). I personally obtained the chief complaint(s) and history of present illness.  I confirmed and edited as necessary the review of systems, past medical/surgical history, family history, social history, and examination findings as documented by others.  I examined the patient myself, and I personally reviewed the relevant tests, images, and reports as documented above. I formulated and edited as necessary the assessment and plan and discussed the findings and management plan with the patient and family.     John Lindsey MD, MA  Director, Cornea & Anterior Segment  [facility] Department of Ophthalmology & Visual Neuroscience

## 2017-08-15 NOTE — PATIENT INSTRUCTIONS
Right eye    Prednisone 1 drop 4x/day    Ocuflox 1 drop 4x/day    Ketorolac 1 drop 4x/day    Srinivasan 1 drop 4x/day    Srinivasan ointment at bedtime    Have 5 minutes between drops, keep eyes closed for 1 minute after the drop    Do not bend over    Do not lift anything more than a gallon    Have shield on while napping and showeing    Avoid swimming pool/bathtub for 2 weeks

## 2017-08-22 ENCOUNTER — OFFICE VISIT (OUTPATIENT)
Dept: OPHTHALMOLOGY | Facility: CLINIC | Age: 67
End: 2017-08-22
Attending: OPHTHALMOLOGY
Payer: COMMERCIAL

## 2017-08-22 DIAGNOSIS — H27.121 DISLOCATED IOL (INTRAOCULAR LENS), ANTERIOR, RIGHT: ICD-10-CM

## 2017-08-22 DIAGNOSIS — Z48.810 AFTERCARE FOLLOWING SURGERY OF A SENSORY ORGAN: ICD-10-CM

## 2017-08-22 DIAGNOSIS — T85.22XS DISLOCATED INTRAOCULAR LENS, SEQUELA: Primary | ICD-10-CM

## 2017-08-22 DIAGNOSIS — Z96.1 PSEUDOPHAKIA OF BOTH EYES: ICD-10-CM

## 2017-08-22 DIAGNOSIS — Z86.69 H/O RD (RETINAL DETACHMENT): ICD-10-CM

## 2017-08-22 PROCEDURE — 99212 OFFICE O/P EST SF 10 MIN: CPT | Mod: ZF

## 2017-08-22 PROCEDURE — 76512 OPH US DX B-SCAN: CPT | Mod: ZF | Performed by: OPHTHALMOLOGY

## 2017-08-22 PROCEDURE — 66020 INJECTION TREATMENT OF EYE: CPT | Mod: ZF | Performed by: OPHTHALMOLOGY

## 2017-08-22 PROCEDURE — 25000125 ZZHC RX 250: Mod: ZF

## 2017-08-22 RX ORDER — OFLOXACIN 3 MG/ML
1 SOLUTION/ DROPS OPHTHALMIC EVERY 4 HOURS
Qty: 1 BOTTLE | Refills: 1 | Status: SHIPPED | OUTPATIENT
Start: 2017-08-22 | End: 2017-11-07

## 2017-08-22 RX ORDER — PILOCARPINE HYDROCHLORIDE 10 MG/ML
1 SOLUTION/ DROPS OPHTHALMIC 2 TIMES DAILY
Qty: 1 BOTTLE | Refills: 1 | Status: SHIPPED | OUTPATIENT
Start: 2017-08-22 | End: 2017-10-10

## 2017-08-22 RX ORDER — PREDNISOLONE ACETATE 10 MG/ML
1 SUSPENSION/ DROPS OPHTHALMIC 4 TIMES DAILY
Qty: 1 BOTTLE | Refills: 1 | Status: SHIPPED | OUTPATIENT
Start: 2017-08-22 | End: 2017-09-22

## 2017-08-22 ASSESSMENT — VISUAL ACUITY
METHOD: SNELLEN - LINEAR
OD_SC: CF @ 2'
OS_CC: 20/30

## 2017-08-22 ASSESSMENT — TONOMETRY
OS_IOP_MMHG: 14
IOP_METHOD: TONOPEN
OD_IOP_MMHG: 27
OD_IOP_MMHG: 25
IOP_METHOD: TONOPEN

## 2017-08-22 ASSESSMENT — CONF VISUAL FIELD
OD_INFERIOR_TEMPORAL_RESTRICTION: 1
OD_INFERIOR_NASAL_RESTRICTION: 1
METHOD: COUNTING FINGERS
OS_NORMAL: 1
OD_SUPERIOR_NASAL_RESTRICTION: 3
OD_SUPERIOR_TEMPORAL_RESTRICTION: 3

## 2017-08-22 ASSESSMENT — REFRACTION_WEARINGRX
OD_SPHERE: -2.25
OD_CYLINDER: +0.50
OS_CYLINDER: +1.25
OD_AXIS: 073
OS_AXIS: 016
OD_ADD: +2.75
OS_SPHERE: -1.25
OS_ADD: +2.75

## 2017-08-22 ASSESSMENT — SLIT LAMP EXAM - LIDS: COMMENTS: NORMAL

## 2017-08-22 NOTE — PATIENT INSTRUCTIONS
Right eye    Start ofloxacin 1 drop every 1 hour until tonight, then decrease to 4 times a day from tomorrow    Continue Prednisolone eyedrop 1 drop 4 times a day    Pilocarpine 1 drop 2 times a day

## 2017-08-22 NOTE — MR AVS SNAPSHOT
After Visit Summary   8/22/2017    Leodan Yanez    MRN: [id number]           Patient Information     Date Of Birth          1950        Visit Information        Provider Department      8/22/2017 3:15 PM John Lindsey MD [facility]        Today's Diagnoses     Dislocated intraocular lens, sequela - Right Eye    -  1    H/O RD (retinal detachment)        Aftercare following surgery of a sensory organ - Right Eye        Pseudophakia of both eyes        Dislocated IOL (intraocular lens), anterior, right          Care Instructions    Right eye    Start ofloxacin 1 drop every 1 hour until tonight, then decrease to 4 times a day from tomorrow    Continue Prednisolone eyedrop 1 drop 4 times a day    Pilocarpine 1 drop 2 times a day          Follow-ups after your visit        Follow-up notes from your care team     Return in about 1 week (around 8/29/2017) for Follow Up.      Your next 10 appointments already scheduled     Aug 31, 2017  8:15 AM CDT   Post-Op with John Lindsey MD   Eye Clinic ([facility])    [address]  [address] [place] [address]   [phone]            Aug 31, 2017  9:30 AM CDT   RETURN RETINA with Rose Weston MD   Eye Clinic ([facility])    [address]  [address] [address]   [phone]              Who to contact     Please call your clinic at [phone] to:    Ask questions about your health    Make or cancel appointments    Discuss your medicines    Learn about your test results    Speak to your doctor   If you have compliments or concerns about an experience at your clinic, or if you wish to file a complaint, please contact [facility] Patient Relations at [phone] or email us at [email or web address]         Additional Information About Your Visit        MyChart Information     Lizz is an  electronic gateway that provides easy, online access to your medical records. With Koalify, you can request a clinic appointment, read your test results, renew a prescription or communicate with your care team.     To sign up for Koalify visit the website at [email or web address]   You will be asked to enter the access code listed below, as well as some personal information. Please follow the directions to create your username and password.     Your access code is: [email or web address]  Expires: 10/26/2017 11:01 AM     Your access code will  in 90 days. If you need help or a new code, please contact your [facility] or call [phone] for assistance.        Care EveryWhere ID     This is your Care EveryWhere ID. This could be used by other organizations to access your [place] medical records  [id number]         Blood Pressure from Last 3 Encounters:   17 121/82   17 122/88   17 111/77    Weight from Last 3 Encounters:   17 (!) 144.2 kg (318 lb)   17 (!) 143.8 kg (317 lb)   17 (!) 143.8 kg (317 lb)              We Performed the Following     Anterior Chamber Reformation, Air/Liquid     Ultrasound B-scan OD (right eye)          Today's Medication Changes          These changes are accurate as of: 17 11:59 PM.  If you have any questions, ask your nurse or doctor.               Start taking these medicines.        Dose/Directions    pilocarpine 1 % ophthalmic solution   Commonly known as:  PILOCAR   Used for:  Aftercare following surgery of a sensory organ, Dislocated intraocular lens, sequela   Started by:  John Lindsey MD        Dose:  1 drop   Place 1 drop into the right eye 2 times daily   Quantity:  1 Bottle   Refills:  1         These medicines have changed or have updated prescriptions.        Dose/Directions    ofloxacin 0.3 % ophthalmic solution   Commonly known as:  OCUFLOX   This may have changed:  when to take this   Used  for:  Dislocated IOL (intraocular lens), anterior, right   Changed by:  John Lindsey MD        Dose:  1 drop   Place 1 drop into the right eye every 4 hours   Quantity:  1 Bottle   Refills:  1            Where to get your medicines      These medications were [facility] to [id number] - [place], [place] - [address] [facility]  [address] [facility], [facility] [address]     Phone:  [phone]     ofloxacin 0.3 % ophthalmic solution    pilocarpine 1 % ophthalmic solution    prednisoLONE acetate 1 % ophthalmic susp                Primary Care Provider Office Phone # Fax #    Stevie Krueger [phone]       [place] [facility] [address] [facility] [address]        Equal Access to Services     BRITTNI [facility] HospitalTHIERRY : Hadjacinto Liu, waliya greenberg, qaybta kaalmada yvonne, karen okeefe. So [facility] [phone].    ATENCIÓN: Si habla español, tiene a martinez disposición servicios gratuitos de asistencia lingüística. Ll[facility] [phone].    We comply with applicable federal civil rights laws and [place] laws. We do not discriminate on the basis of race, color, national origin, age, disability sex, sexual orientation or gender identity.            Thank you!     Thank you for choosing EYE CLINIC  for your care. Our goal is always to provide you with excellent care. Hearing back from our patients is one way we can continue to improve our services. Please take a few minutes to complete the written survey that you may receive in the mail after your visit with us. Thank you!             Your Updated Medication List - Protect others around you: Learn how to safely use, store and throw away your medicines at www.disposemymeds.org.          This list is accurate as of: 8/22/17 11:59 PM.  Always use your most recent med list.                   Brand Name Dispense Instructions for use Diagnosis    acetaminophen 325 MG tablet    TYLENOL    250 tablet     Take 2 tablets by mouth every 6 hours as needed. Pain.    Upper GI bleed       aspirin 81 MG chewable tablet      Take 81 mg by mouth daily        calcium + D 600-200 MG-UNIT Tabs   Generic drug:  calcium carbonate-vitamin D      Take 1 tablet by mouth daily.        CELEBREX PO      Take 200 mg by mouth daily        FLUOXETINE HCL PO      Take 60 mg by mouth daily        ketorolac 0.5 % ophthalmic solution    ACULAR    1 Bottle    Place 1 drop into the right eye 4 times daily    Dislocated IOL (intraocular lens), anterior, right       lisinopril-hydrochlorothiazide 20-12.5 MG per tablet    PRINZIDE/ZESTORETIC     Take 1 tablet by mouth 2 times daily        metoprolol 200 MG 24 hr tablet    TOPROL-XL     Take 200 mg by mouth daily        NIFEDIPINE PO      Take 90 mg by mouth daily        ofloxacin 0.3 % ophthalmic solution    OCUFLOX    1 Bottle    Place 1 drop into the right eye every 4 hours    Dislocated IOL (intraocular lens), anterior, right       pantoprazole 40 MG EC tablet    PROTONIX    60 tablet    Take 1 tablet by mouth 2 times daily.    Upper GI bleed       pilocarpine 1 % ophthalmic solution    PILOCAR    1 Bottle    Place 1 drop into the right eye 2 times daily    Aftercare following surgery of a sensory organ, Dislocated intraocular lens, sequela       prednisoLONE acetate 1 % ophthalmic susp    PRED FORTE    1 Bottle    Place 1 drop into the right eye 4 times daily    Dislocated IOL (intraocular lens), anterior, right       * sodium chloride 5 % ophthalmic solution    DILLAN 128    1 Bottle    Place 1 drop into the right eye 4 times daily    Aftercare following surgery of a sensory organ       * sodium chloride 5 % ophthalmic ointment    DILLAN 128    1 Tube    Place 2 Application (1 g) into the right eye At Bedtime    Aftercare following surgery of a sensory organ       * sodium chloride 5 % ophthalmic solution    DILLAN 128    1 Bottle    Place 1 drop into the right eye 4 times daily    Aftercare  following surgery of a sensory organ       tamsulosin 0.4 MG capsule    FLOMAX     Take by mouth daily        VITAMIN B-12 PO           VITAMIN C PO      Take 500 mg by mouth.        vitamin D 2000 UNITS Caps      Take 1 tablet by mouth daily        ZOLPIDEM TARTRATE PO      Take 10 mg by mouth At Bedtime        * Notice:  This list has 3 medication(s) that are the same as other medications prescribed for you. Read the directions carefully, and ask your doctor or other care provider to review them with you.

## 2017-08-22 NOTE — PROGRESS NOTES
POW1 s/p IOL explant, anterior vitrectomy, iris fixated IOL OD (8/14/17)    s/p vitrectomy, intraocular lens repositioning with transcleral fixation right eye 5/12/17    Interval:  Patient reports hazy vision with yellowish hue. He had trouble with CPAP last night. No pain.    Plan:  POW1 s/p IOL explant, anterior vitrectomy, iris fixated IOL OD (8/14/17)  - nasal optic capture with iris chaffing and pigment release. Pigment release likely causing IOP elevation and decreased vision  - microcystic corneal edema, continue asad gtt QID, rhoda QHS  - B scan OD with retina attached, no vitreous hemorrhage, vitreous debris (likely pigment)    Counseled  rec intraocular lens repositioning in office        - Optic respositioned posterior to pupil at slit lamp, see proc note    - taper pred forte QID, start ocuflox Q1hr, decrease to QID from tomorrow  - start pilocarpine BID OD  - Precautions discussed in detail including shield    RTC in 1 week for f/up with me and retina      Lesli Lerner    Complete documentation of historical and exam elements from today's encounter can be found in the full encounter summary report (not reduplicated in this progress note). I personally obtained the chief complaint(s) and history of present illness.  I confirmed and edited as necessary the review of systems, past medical/surgical history, family history, social history, and examination findings as documented by others.  I examined the patient myself, and I personally reviewed the relevant tests, images, and reports as documented above. I formulated and edited as necessary the assessment and plan and discussed the findings and management plan with the patient and family.     I was personally present for the entirety of the in-office procedure.     I personally interpreted the diagnostic / imaging study and have edited the interpretation as needed.    John Lindsey MD, MA  Director, Cornea & Anterior Segment  [facility]  Department of Ophthalmology & Visual Neuroscience

## 2017-08-22 NOTE — NURSING NOTE
Chief Complaints and History of Present Illnesses   Patient presents with     Follow Up For     1 week follow up s/p  IOL explant, anterior vitrectomy, iris fixated IOL OD (8/14/17)     HPI    Affected eye(s):  Right   Symptoms:     Floaters (Comment: still seeing floaters, no changes.)   No flashes   No redness   No Dryness         Do you have eye pain now?:  No      Comments:  Pt states vision is the same as last visit, no changes since surgery.    Gomez WILLIAMSON August 22, 2017 3:05 PM

## 2017-08-31 ENCOUNTER — OFFICE VISIT (OUTPATIENT)
Dept: OPHTHALMOLOGY | Facility: CLINIC | Age: 67
End: 2017-08-31
Attending: OPHTHALMOLOGY
Payer: COMMERCIAL

## 2017-08-31 DIAGNOSIS — Z48.810 AFTERCARE FOLLOWING SURGERY OF A SENSORY ORGAN: ICD-10-CM

## 2017-08-31 DIAGNOSIS — T85.22XA: Primary | ICD-10-CM

## 2017-08-31 DIAGNOSIS — H27.121 DISLOCATED IOL (INTRAOCULAR LENS), ANTERIOR, RIGHT: Primary | ICD-10-CM

## 2017-08-31 PROCEDURE — 99211 OFF/OP EST MAY X REQ PHY/QHP: CPT | Mod: ZF

## 2017-08-31 PROCEDURE — 40000269 ZZH STATISTIC NO CHARGE FACILITY FEE: Mod: ZF

## 2017-08-31 ASSESSMENT — TONOMETRY
IOP_METHOD: TONOPEN
OD_IOP_MMHG: 17
OS_IOP_MMHG: 15

## 2017-08-31 ASSESSMENT — VISUAL ACUITY
OD_CC: 20/70
METHOD: SNELLEN - LINEAR
OD_CC: 20/50-1
OS_CC: 20/25-1
OS_CC: 20/30-1

## 2017-08-31 ASSESSMENT — SLIT LAMP EXAM - LIDS
COMMENTS: NORMAL

## 2017-08-31 NOTE — MR AVS SNAPSHOT
After Visit Summary   2017    Leodan Yanez    MRN: [id number]           Patient Information     Date Of Birth          1950        Visit Information        Provider Department      2017 9:30 AM [facility]        Today's Diagnoses     Dislocation of intraocular lens into vitreous of both eyes    -  1    Aftercare following surgery of a sensory organ           Follow-ups after your visit        Who to contact     Please call your clinic at [phone] to:    Ask questions about your health    Make or cancel appointments    Discuss your medicines    Learn about your test results    Speak to your doctor   If you have compliments or concerns about an experience at your clinic, or if you wish to file a complaint, please contact [facility] Patient Relations at [phone] or email us at [email or web address]         Additional Information About Your Visit        MyChart Information     Adyen is an electronic gateway that provides easy, online access to your medical records. With Adyen, you can request a clinic appointment, read your test results, renew a prescription or communicate with your care team.     To sign up for [employer] visit the website at [email or web address]   You will be asked to enter the access code listed below, as well as some personal information. Please follow the directions to create your username and password.     Your access code is: [email or web address]  Expires: 10/26/2017 11:01 AM     Your access code will  in 90 days. If you need help or a new code, please contact your [facility] or call [phone] for assistance.        Care EveryWhere ID     This is your Care EveryWhere ID. This could be used by other organizations to access your [facility] medical records  [id number]         Blood Pressure from Last 3 Encounters:   17 121/82   17 122/88    05/12/17 111/77    Weight from Last 3 Encounters:   08/14/17 (!) 144.2 kg (318 lb)   08/03/17 (!) 143.8 kg (317 lb)   05/11/17 (!) 143.8 kg (317 lb)              Today, you had the following     No orders found for display       Primary Care Provider Office Phone # Fax #    Stevie Krueger [phone]       [facility] [address] [address] [facility] [address]        Equal Access to Services     BRITTNI [facility] CountyTHIERRY : Hadii lg leach hadasho Socolleen, waaxda luqadaha, qaybta kaalmada adeegyaaz, karen escobar . So [facility] [phone].    ATENCIÓN: Si habla español, tiene a martinez disposición servicios gratuitos de asistencia lingüística. [facility] [phone].    We comply with applicable federal civil rights laws and [place] laws. We do not discriminate on the basis of race, color, national origin, age, disability sex, sexual orientation or gender identity.            Thank you!     Thank you for choosing [facility]  for your care. Our goal is always to provide you with excellent care. Hearing back from our patients is one way we can continue to improve our services. Please take a few minutes to complete the written survey that you may receive in the mail after your visit with us. Thank you!             Your Updated Medication List - Protect others around you: Learn how to safely use, store and throw away your medicines at www.disposemymeds.org.          This list is accurate as of: 8/31/17 10:08 AM.  Always use your most recent med list.                   Brand Name Dispense Instructions for use Diagnosis    acetaminophen 325 MG tablet    TYLENOL    250 tablet    Take 2 tablets by mouth every 6 hours as needed. Pain.    Upper GI bleed       aspirin 81 MG chewable tablet      Take 81 mg by mouth daily        calcium + D 600-200 MG-UNIT Tabs   Generic drug:  calcium carbonate-vitamin D      Take 1 tablet by mouth daily.        CELEBREX PO      Take 200 mg by mouth daily         FLUOXETINE HCL PO      Take 60 mg by mouth daily        ketorolac 0.5 % ophthalmic solution    ACULAR    1 Bottle    Place 1 drop into the right eye 4 times daily    Dislocated IOL (intraocular lens), anterior, right       lisinopril-hydrochlorothiazide 20-12.5 MG per tablet    PRINZIDE/ZESTORETIC     Take 1 tablet by mouth 2 times daily        metoprolol 200 MG 24 hr tablet    TOPROL-XL     Take 200 mg by mouth daily        NIFEDIPINE PO      Take 90 mg by mouth daily        ofloxacin 0.3 % ophthalmic solution    OCUFLOX    1 Bottle    Place 1 drop into the right eye every 4 hours    Dislocated IOL (intraocular lens), anterior, right       pantoprazole 40 MG EC tablet    PROTONIX    60 tablet    Take 1 tablet by mouth 2 times daily.    Upper GI bleed       pilocarpine 1 % ophthalmic solution    PILOCAR    1 Bottle    Place 1 drop into the right eye 2 times daily    Aftercare following surgery of a sensory organ, Dislocated intraocular lens, sequela       prednisoLONE acetate 1 % ophthalmic susp    PRED FORTE    1 Bottle    Place 1 drop into the right eye 4 times daily    Dislocated IOL (intraocular lens), anterior, right       * sodium chloride 5 % ophthalmic solution    DILLAN 128    1 Bottle    Place 1 drop into the right eye 4 times daily    Aftercare following surgery of a sensory organ       * sodium chloride 5 % ophthalmic ointment    DILLAN 128    1 Tube    Place 2 Application (1 g) into the right eye At Bedtime    Aftercare following surgery of a sensory organ       * sodium chloride 5 % ophthalmic solution    DILLAN 128    1 Bottle    Place 1 drop into the right eye 4 times daily    Aftercare following surgery of a sensory organ       tamsulosin 0.4 MG capsule    FLOMAX     Take by mouth daily        VITAMIN B-12 PO           VITAMIN C PO      Take 500 mg by mouth.        vitamin D 2000 UNITS Caps      Take 1 tablet by mouth daily        ZOLPIDEM TARTRATE PO      Take 10 mg by mouth At Bedtime        *  Notice:  This list has 3 medication(s) that are the same as other medications prescribed for you. Read the directions carefully, and ask your doctor or other care provider to review them with you.

## 2017-08-31 NOTE — PROGRESS NOTES
POW2 s/p IOL explant, anterior vitrectomy, iris fixated IOL OD (8/14/17)    s/p anterior vitrectomy, intraocular lens repositioning with transcleral fixation right eye 5/12/17    Interval:  Patient reports improved vision. No pain, photophobia, flashes, floaters.    Plan:  POW2 s/p IOL explant, anterior vitrectomy, iris fixated IOL OD (8/14/17)  S/p IOL repositioning in office 1 week prior   Optic capture again today while on pilocarpine  Monitor for now, IOL likely settling down, improved pigment in AC today   May consider further manipulation of develops another episode of pigment release and subsequent IOP elevation  corneal edema has resolved: stop asad  last B scan OD with retina attached, no vitreous hemorrhage, vitreous debris (likely pigment)  taper pred forte and acular 3-2-1 x 1 week each, stop ocuflox  continue pilocarpine BID      RTC in 1 month       Lesli Lerner      ~~~~~~~~~~~~~~~~~~~~~~~~~~~~~~~~~~~~~~~~~~~~~~~~~~~~~~~~~~~~~~~~    Complete documentation of historical and exam elements from today's encounter can be found in the full encounter summary report (not reduplicated in this progress note). I personally obtained the chief complaint(s) and history of present illness.  I confirmed and edited as necessary the review of systems, past medical/surgical history, family history, social history, and examination findings as documented by others.  I examined the patient myself, and I personally reviewed the relevant tests, images, and reports as documented above. I formulated and edited as necessary the assessment and plan and discussed the findings and management plan with the patient and family.     John Lindsey MD, MA  Director, Cornea & Anterior Segment  [facility] Department of Ophthalmology & Visual Neuroscience

## 2017-08-31 NOTE — MR AVS SNAPSHOT
After Visit Summary   2017    Leodan Yanez    MRN: [id number]           Patient Information     Date Of Birth          1950        Visit Information        Provider Department      2017 8:15 AM John Lindsey MD [facility]        Care Instructions    Right eye    Prednisolone 1 drop 3 times a day for 1 week, 2 times a day for 1 week, daily for 1 week and stop    Acular eyedrop 1 drop 3 times a day for 1 week, 2 times a day for 1 week, daily for 1 week and stop forte     Pilocarpine 1 drop 2 times a day    Stop ofloxacin and asad eyedrop              Follow-ups after your visit        Follow-up notes from your care team     Return in about 1 month (around 2017) for Follow Up.      Who to contact     Please call your clinic at [phone] to:    Ask questions about your health    Make or cancel appointments    Discuss your medicines    Learn about your test results    Speak to your doctor   If you have compliments or concerns about an experience at your clinic, or if you wish to file a complaint, please contact [facility] Patient Relations at [phone] or email us at [email or web address]         Additional Information About Your Visit        MyChart Information     Arkimediahart is an electronic gateway that provides easy, online access to your medical records. With [employer], you can request a clinic appointment, read your test results, renew a prescription or communicate with your care team.     To sign up for [employer] visit the website at [email or web address]   You will be asked to enter the access code listed below, as well as some personal information. Please follow the directions to create your username and password.     Your access code is: [email or web address]  Expires: 10/26/2017 11:01 AM     Your access code will  in 90 days. If you need help or a new code, please contact your [facility] or call  [phone] for assistance.        Care EveryWhere ID     This is your Care EveryWhere ID. This could be used by other organizations to access your [place] medical records  [id number]         Blood Pressure from Last 3 Encounters:   08/14/17 121/82   08/03/17 122/88   05/12/17 111/77    Weight from Last 3 Encounters:   08/14/17 (!) 144.2 kg (318 lb)   08/03/17 (!) 143.8 kg (317 lb)   05/11/17 (!) 143.8 kg (317 lb)              Today, you had the following     No orders found for display       Primary Care Provider Office Phone # Fax #    Stevie Krueger [phone]       [facility] [address] [address] [facility] [address]        Equal Access to Services     MUKUND AGARWAL : Hadii aad ku hadasho Socolleen, waaxda luqadaha, qaybta kaalmada yvonne, karen escobar . So [facility] [phone].    ATENCIÓN: Si habla español, tiene a martinez disposición servicios gratuitos de asistencia lingüística. Will al [phone].    We comply with applicable federal civil rights laws and [place] laws. We do not discriminate on the basis of race, color, national origin, age, disability sex, sexual orientation or gender identity.            Thank you!     Thank you for choosing [facility]  for your care. Our goal is always to provide you with excellent care. Hearing back from our patients is one way we can continue to improve our services. Please take a few minutes to complete the written survey that you may receive in the mail after your visit with us. Thank you!             Your Updated Medication List - Protect others around you: Learn how to safely use, store and throw away your medicines at www.disposemymeds.org.          This list is accurate as of: 8/31/17 10:13 AM.  Always use your most recent med list.                   Brand Name Dispense Instructions for use Diagnosis    acetaminophen 325 MG tablet    TYLENOL    250 tablet    Take 2 tablets by mouth every 6 hours as  needed. Pain.    Upper GI bleed       aspirin 81 MG chewable tablet      Take 81 mg by mouth daily        calcium + D 600-200 MG-UNIT Tabs   Generic drug:  calcium carbonate-vitamin D      Take 1 tablet by mouth daily.        CELEBREX PO      Take 200 mg by mouth daily        FLUOXETINE HCL PO      Take 60 mg by mouth daily        ketorolac 0.5 % ophthalmic solution    ACULAR    1 Bottle    Place 1 drop into the right eye 4 times daily    Dislocated IOL (intraocular lens), anterior, right       lisinopril-hydrochlorothiazide 20-12.5 MG per tablet    PRINZIDE/ZESTORETIC     Take 1 tablet by mouth 2 times daily        metoprolol 200 MG 24 hr tablet    TOPROL-XL     Take 200 mg by mouth daily        NIFEDIPINE PO      Take 90 mg by mouth daily        ofloxacin 0.3 % ophthalmic solution    OCUFLOX    1 Bottle    Place 1 drop into the right eye every 4 hours    Dislocated IOL (intraocular lens), anterior, right       pantoprazole 40 MG EC tablet    PROTONIX    60 tablet    Take 1 tablet by mouth 2 times daily.    Upper GI bleed       pilocarpine 1 % ophthalmic solution    PILOCAR    1 Bottle    Place 1 drop into the right eye 2 times daily    Aftercare following surgery of a sensory organ, Dislocated intraocular lens, sequela       prednisoLONE acetate 1 % ophthalmic susp    PRED FORTE    1 Bottle    Place 1 drop into the right eye 4 times daily    Dislocated IOL (intraocular lens), anterior, right       * sodium chloride 5 % ophthalmic solution    DILLAN 128    1 Bottle    Place 1 drop into the right eye 4 times daily    Aftercare following surgery of a sensory organ       * sodium chloride 5 % ophthalmic ointment    DILLAN 128    1 Tube    Place 2 Application (1 g) into the right eye At Bedtime    Aftercare following surgery of a sensory organ       * sodium chloride 5 % ophthalmic solution    DILLAN 128    1 Bottle    Place 1 drop into the right eye 4 times daily    Aftercare following surgery of a sensory organ        tamsulosin 0.4 MG capsule    FLOMAX     Take by mouth daily        VITAMIN B-12 PO           VITAMIN C PO      Take 500 mg by mouth.        vitamin D 2000 UNITS Caps      Take 1 tablet by mouth daily        ZOLPIDEM TARTRATE PO      Take 10 mg by mouth At Bedtime        * Notice:  This list has 3 medication(s) that are the same as other medications prescribed for you. Read the directions carefully, and ask your doctor or other care provider to review them with you.

## 2017-08-31 NOTE — PROGRESS NOTES
Postoperative month 3 status post PPV, EL, IOL repositioning and scleral fixation OD 5/12/17 with Dr. Lindsey. S/p IOL explant, anterior vitrectomy, iris fixates IOL OD with Dr. Lindsey 8/14/17    Intraocular lens with optic capture nasally    Plan:  Doing well  Gtts per cornea  Retina detachment and endophthalmitis precautions were discussed with the patient and was asked to return if any of the those occur    Medications to operative eye per cornea  Follow up in 2-3 months with Optical Coherence Tomography, sooner as needed     RTC 3-4 months    Bakari Mina MD  PGY3, Dept of Ophthalmology  Pager [phone]    ~~~~~~~~~~~~~~~~~~~~~~~~~~~~~~~~~~   Complete documentation of historical and exam elements from today's encounter can be found in the full encounter summary report (not reduplicated in this progress note).  I personally obtained the chief complaint(s) and history of present illness.  I confirmed and edited as necessary the review of systems, past medical/surgical history, family history, social history, and examination findings as documented by others; and I examined the patient myself.  I personally reviewed the relevant tests, images, and reports as documented above.  I formulated and edited as necessary the assessment and plan and discussed the findings and management plan with the patient and family    Rose Weston MD  .  Retina Service   Department of Ophthalmology and Visual Neurosciences   [facility]  Phone: [phone]   Fax: [phone]

## 2017-08-31 NOTE — PATIENT INSTRUCTIONS
Right eye    Prednisolone 1 drop 3 times a day for 1 week, 2 times a day for 1 week, daily for 1 week and stop    Acular eyedrop 1 drop 3 times a day for 1 week, 2 times a day for 1 week, daily for 1 week and stop forte     Pilocarpine 1 drop 2 times a day    Stop ofloxacin and asad eyedrop

## 2017-09-05 ENCOUNTER — OFFICE VISIT (OUTPATIENT)
Dept: OPHTHALMOLOGY | Facility: CLINIC | Age: 67
End: 2017-09-05
Attending: OPHTHALMOLOGY
Payer: COMMERCIAL

## 2017-09-05 DIAGNOSIS — H21.89: ICD-10-CM

## 2017-09-05 DIAGNOSIS — T85.29XD: ICD-10-CM

## 2017-09-05 DIAGNOSIS — H52.11 MYOPIA, RIGHT: Primary | ICD-10-CM

## 2017-09-05 DIAGNOSIS — H40.051 BORDERLINE GLAUCOMA WITH OCULAR HYPERTENSION, RIGHT: ICD-10-CM

## 2017-09-05 PROCEDURE — 99212 OFFICE O/P EST SF 10 MIN: CPT | Mod: ZF

## 2017-09-05 PROCEDURE — 76512 OPH US DX B-SCAN: CPT | Mod: ZF | Performed by: OPHTHALMOLOGY

## 2017-09-05 RX ORDER — BRIMONIDINE TARTRATE AND TIMOLOL MALEATE 2; 5 MG/ML; MG/ML
1 SOLUTION OPHTHALMIC 2 TIMES DAILY
Qty: 1 BOTTLE | Refills: 3 | Status: SHIPPED | OUTPATIENT
Start: 2017-09-05 | End: 2017-11-07

## 2017-09-05 ASSESSMENT — TONOMETRY
OD_IOP_MMHG: 40
IOP_METHOD: ICARE
OS_IOP_MMHG: 18

## 2017-09-05 ASSESSMENT — CONF VISUAL FIELD
OS_NORMAL: 1
OD_INFERIOR_NASAL_RESTRICTION: 3
OD_SUPERIOR_NASAL_RESTRICTION: 3
OD_SUPERIOR_TEMPORAL_RESTRICTION: 3
OD_INFERIOR_TEMPORAL_RESTRICTION: 3

## 2017-09-05 ASSESSMENT — VISUAL ACUITY
OS_CC+: +2
OD_SC: 20/300
OS_CC: 20/40
METHOD: SNELLEN - LINEAR
OD_CC: 20/400

## 2017-09-05 ASSESSMENT — SLIT LAMP EXAM - LIDS: COMMENTS: NORMAL

## 2017-09-05 NOTE — NURSING NOTE
Chief Complaints and History of Present Illnesses   Patient presents with     Post Op (Ophthalmology) Right Eye     s/p IOL explant, anterior vitrectomy, iris fixated IOL OD     HPI    Affected eye(s):  Right   Symptoms:     Blurred vision      Frequency:  Constant       Do you have eye pain now?:  No      Comments:  s/p IOL explant, anterior vitrectomy, iris fixated IOL OD 8/14/17. Starting this morning vision getting blurry in right eye. Progrssively getting worse over the day. Now like  Looking through gray sheet. No eye pain. Using 3 drops, one is BID and 2 are TID OD.     Keyanna Ly COT 2:23 PM September 5, 2017

## 2017-09-05 NOTE — PROGRESS NOTES
CC:     HPI: Leodan Yanez is a 67 year old male who presents for acute vision change. He is POW #3 s/p IOL explant, anterior vitrectomy, iris fixated IOL OD.  He notes that today a sheet came over the right eye that was present when he woke up. Denies precipitating trauma.     POHx:   IOL explant, anterior vitrectomy, iris fixated IOL OD (8/14/17)   Anterior vitrectomy, intraocular lens repositioning with transcleral fixation right eye 5/12/17    Current Eye Medications:   Ketorolac 0.5% TID OD  Pred Forte q2h, TID OD   Pilocarpine BID, OD    Review of Testing:  NA    Assessment & Plan     Leodan Yanez is a 67 year old male with the following diagnoses:     1. S/p IOL explant, anterior vitrectomy, iris fixated IOL OD (8/14/17)  Recurrence of pigment dispersion     - VA 20/300  - S/p IOL repositioning on 8/22/17  - IOP 40 today  - Optic capture nasally   - Worsened AC pigment  - continue pred, acular and cheryle. Start combigan BID OD  - B scan today with attached retina, debris in vitreous, vitreous adhesions      - discussed the r/b/a of IOL explant and subsequent aphakia (he has low power IOL 2D). Will schedule today    RBB  60 min      John Bruce MD  Ophthalmology, PGY-3      ~~~~~~~~~~~~~~~~~~~~~~~~~~~~~~~~~~~~~~~~~~~~~~~~~~~~~~~~~~~~~~~~    Complete documentation of historical and exam elements from today's encounter can be found in the full encounter summary report (not reduplicated in this progress note). I personally obtained the chief complaint(s) and history of present illness.  I confirmed and edited as necessary the review of systems, past medical/surgical history, family history, social history, and examination findings as documented by others.  I examined the patient myself, and I personally reviewed the relevant tests, images, and reports as documented above. I formulated and edited as necessary the assessment and plan and discussed the findings and management plan with the patient and family.      John Lindsey MD, MA  Director, Cornea & Anterior Segment  [facility] Department of Ophthalmology & Visual Neuroscience

## 2017-09-05 NOTE — PATIENT INSTRUCTIONS
Continue prednisolone and ketorolac (pink and grey caps) four times a day      Stop pilocarpine (green cap)      Add combigan (new drop) twice a day

## 2017-09-05 NOTE — MR AVS SNAPSHOT
After Visit Summary   9/5/2017    Leodan Yanez    MRN: [id number]           Patient Information     Date Of Birth          1950        Visit Information        Provider Department      9/5/2017 1:45 PM John Lindsey MD [facility]        Today's Diagnoses     Myopia, right    -  1    Iridodonesis        Borderline glaucoma with ocular hypertension, right        Mechanical complication due to ocular lens prosthesis, subsequent encounter          Care Instructions        Continue prednisolone and ketorolac (pink and grey caps) four times a day      Stop pilocarpine (green cap)      Add combigan (new drop) twice a day           Follow-ups after your visit        Your next 10 appointments already scheduled     Oct 03, 2017  3:00 PM CDT   RETURN CORNEA with John Lindsey MD   Eye Clinic ([facility])    [address]  [address]  9th [address]  [facility] [id number] [phone]              Who to contact     Please call your clinic at [phone] to:    Ask questions about your health    Make or cancel appointments    Discuss your medicines    Learn about your test results    Speak to your doctor   If you have compliments or concerns about an experience at your clinic, or if you wish to file a complaint, please contact [facility] Patient Relations at [phone] or email us at [email or web address]         Additional Information About Your Visit        MyChart Information     [employer] is an electronic gateway that provides easy, online access to your medical records. With [employer], you can request a clinic appointment, read your test results, renew a prescription or communicate with your care team.     To sign up for [employer] visit the website at [email or web address]   You will be asked to enter the access code listed below, as well as some personal information. Please follow the directions to create your username  and password.     Your access code is: [email or web address]  Expires: 10/26/2017 11:01 AM     Your access code will  in 90 days. If you need help or a new code, please contact your [facility] or call [phone] for assistance.        Care EveryWhere ID     This is your Care EveryWhere ID. This could be used by other organizations to access your [place] medical records  [id number]         Blood Pressure from Last 3 Encounters:   17 121/82   17 122/88   17 111/77    Weight from Last 3 Encounters:   17 (!) 144.2 kg (318 lb)   17 (!) 143.8 kg (317 lb)   17 (!) 143.8 kg (317 lb)              We Performed the Following     Asya-Operative Worksheet     Ultrasound B-scan OD (right eye)          Today's Medication Changes          These changes are accurate as of: 17  4:23 PM.  If you have any questions, ask your nurse or doctor.               Start taking these medicines.        Dose/Directions    brimonidine-timolol 0.2-0.5 % ophthalmic solution   Commonly known as:  COMBIGAN   Used for:  Borderline glaucoma with ocular hypertension, right   Started by:  John Lindsey MD        Dose:  1 drop   Place 1 drop into the right eye 2 times daily   Quantity:  1 Bottle   Refills:  3            Where to get your medicines      These medications were sent to [id number] - [place], [place] - [address]  [address] [facility] [address]     Phone:  [phone]     brimonidine-timolol 0.2-0.5 % ophthalmic solution                Primary Care Provider Office Phone # Fax #    Stevie Krueger [phone]       [facility] [address] [facility] [address]        Equal Access to Services     MUKUND AGARWAL AH: Padmini Liu, loren greenberg, qagénesis kaclara russell, karen okeefe. [facility] [phone].    ATENCIÓN: Si sebastian han a martinez disposición  servicios gratuitos de asistencia lingüística. Will marques [phone].    We comply with applicable federal civil rights laws and [place] laws. We do not discriminate on the basis of race, color, national origin, age, disability sex, sexual orientation or gender identity.            Thank you!     Thank you for choosing EYE CLINIC  for your care. Our goal is always to provide you with excellent care. Hearing back from our patients is one way we can continue to improve our services. Please take a few minutes to complete the written survey that you may receive in the mail after your visit with us. Thank you!             Your Updated Medication List - Protect others around you: Learn how to safely use, store and throw away your medicines at www.disposemymeds.org.          This list is accurate as of: 9/5/17  4:23 PM.  Always use your most recent med list.                   Brand Name Dispense Instructions for use Diagnosis    acetaminophen 325 MG tablet    TYLENOL    250 tablet    Take 2 tablets by mouth every 6 hours as needed. Pain.    Upper GI bleed       aspirin 81 MG chewable tablet      Take 81 mg by mouth daily        brimonidine-timolol 0.2-0.5 % ophthalmic solution    COMBIGAN    1 Bottle    Place 1 drop into the right eye 2 times daily    Borderline glaucoma with ocular hypertension, right       calcium + D 600-200 MG-UNIT Tabs   Generic drug:  calcium carbonate-vitamin D      Take 1 tablet by mouth daily.        CELEBREX PO      Take 200 mg by mouth daily        FLUOXETINE HCL PO      Take 60 mg by mouth daily        ketorolac 0.5 % ophthalmic solution    ACULAR    1 Bottle    Place 1 drop into the right eye 4 times daily    Dislocated IOL (intraocular lens), anterior, right       lisinopril-hydrochlorothiazide 20-12.5 MG per tablet    PRINZIDE/ZESTORETIC     Take 1 tablet by mouth 2 times daily        metoprolol 200 MG 24 hr tablet    TOPROL-XL     Take 200 mg by mouth daily        NIFEDIPINE PO      Take  90 mg by mouth daily        ofloxacin 0.3 % ophthalmic solution    OCUFLOX    1 Bottle    Place 1 drop into the right eye every 4 hours    Dislocated IOL (intraocular lens), anterior, right       pantoprazole 40 MG EC tablet    PROTONIX    60 tablet    Take 1 tablet by mouth 2 times daily.    Upper GI bleed       pilocarpine 1 % ophthalmic solution    PILOCAR    1 Bottle    Place 1 drop into the right eye 2 times daily    Aftercare following surgery of a sensory organ, Dislocated intraocular lens, sequela       prednisoLONE acetate 1 % ophthalmic susp    PRED FORTE    1 Bottle    Place 1 drop into the right eye 4 times daily    Dislocated IOL (intraocular lens), anterior, right       * sodium chloride 5 % ophthalmic solution    DILLAN 128    1 Bottle    Place 1 drop into the right eye 4 times daily    Aftercare following surgery of a sensory organ       * sodium chloride 5 % ophthalmic ointment    DILLAN 128    1 Tube    Place 2 Application (1 g) into the right eye At Bedtime    Aftercare following surgery of a sensory organ       * sodium chloride 5 % ophthalmic solution    DILLAN 128    1 Bottle    Place 1 drop into the right eye 4 times daily    Aftercare following surgery of a sensory organ       tamsulosin 0.4 MG capsule    FLOMAX     Take by mouth daily        VITAMIN B-12 PO           VITAMIN C PO      Take 500 mg by mouth.        vitamin D 2000 UNITS Caps      Take 1 tablet by mouth daily        ZOLPIDEM TARTRATE PO      Take 10 mg by mouth At Bedtime        * Notice:  This list has 3 medication(s) that are the same as other medications prescribed for you. Read the directions carefully, and ask your doctor or other care provider to review them with you.

## 2017-09-13 ENCOUNTER — TRANSFERRED RECORDS (OUTPATIENT)
Dept: HEALTH INFORMATION MANAGEMENT | Facility: CLINIC | Age: 67
End: 2017-09-13

## 2017-09-14 ENCOUNTER — OFFICE VISIT (OUTPATIENT)
Dept: OPHTHALMOLOGY | Facility: CLINIC | Age: 67
End: 2017-09-14
Attending: OPHTHALMOLOGY
Payer: COMMERCIAL

## 2017-09-14 DIAGNOSIS — H27.01 APHAKIA, RIGHT: ICD-10-CM

## 2017-09-14 DIAGNOSIS — H21.89: Primary | ICD-10-CM

## 2017-09-14 PROCEDURE — 99213 OFFICE O/P EST LOW 20 MIN: CPT | Mod: ZF

## 2017-09-14 ASSESSMENT — VISUAL ACUITY
METHOD: SNELLEN - LINEAR
OS_SC: 20/25
OS_SC+: -2
OD_SC: 20/250

## 2017-09-14 ASSESSMENT — TONOMETRY
OD_IOP_MMHG: 09
OS_IOP_MMHG: 16
IOP_METHOD: ICARE

## 2017-09-14 ASSESSMENT — SLIT LAMP EXAM - LIDS: COMMENTS: NORMAL

## 2017-09-14 NOTE — NURSING NOTE
Chief Complaints and History of Present Illnesses   Patient presents with     Follow Up For     Removal of dislocated IOL RE 09/13/2017     HPI    Affected eye(s):  Right   Symptoms:     Blurred vision   Decreased vision         Do you have eye pain now?:  No      Comments:  Ivy PARKER 1:18 PM September 14, 2017

## 2017-09-14 NOTE — PROGRESS NOTES
CC: POD #1 IOL explantation, PPV    HPI: Leodan Yanez is a 67 year old male who presents for Postoperative day#1 s/p intraocular lens explant right eye     POHx:   IOL explant, anterior vitrectomy, iris fixated IOL OD (8/14/17)   Anterior vitrectomy, intraocular lens repositioning with transcleral fixation right eye 5/12/17  IOL explantation, PPV, aphakic 9/13/17    Current Eye Medications:   Ketorolac 0.5% TID OD  Pred Forte q2h, TID OD   Pilocarpine BID, OD    Review of Testing:  NA    Assessment & Plan     Leodan Yanez is a 67 year old male with the following diagnoses:     1. S/p IOL explant, anterior vitrectomy, iris fixated IOL OD (8/14/17)  Recurrence of pigment dispersion   Subsequent intraocular lens explant yesterday.    - VA 20/250, subjective improvement  - moderate inflammation and AC pigment, and corneal edema  - Start Pred Q2H OD  - Oflox QID OD  - Ketorolac QID OD      Adeel Tejeda,   Cornea Fellow      ~~~~~~~~~~~~~~~~~~~~~~~~~~~~~~~~~~~~~~~~~~~~~~~~~~~~~~~~~~~~~~~~    Complete documentation of historical and exam elements from today's encounter can be found in the full encounter summary report (not reduplicated in this progress note). I personally obtained the chief complaint(s) and history of present illness.  I confirmed and edited as necessary the review of systems, past medical/surgical history, family history, social history, and examination findings as documented by others.  I examined the patient myself, and I personally reviewed the relevant tests, images, and reports as documented above. I formulated and edited as necessary the assessment and plan and discussed the findings and management plan with the patient and family.     John Lindsey MD, MA  Director, Cornea & Anterior Segment  [facility] Department of Ophthalmology & Visual Neuroscience

## 2017-09-14 NOTE — MR AVS SNAPSHOT
After Visit Summary   9/14/2017    Leodan Yanez    MRN: [id number]           Patient Information     Date Of Birth          1950        Visit Information        Provider Department      9/14/2017 1:00 PM John Lindsey MD Eye Clinic        Today's Diagnoses     Iridodonesis    -  1    Aphakia, right          Care Instructions    Continue Prednisolone every 2 hours right eye (pink)    Start Ofloxacin at 4 times/day right eye  (tan)          Follow-ups after your visit        Follow-up notes from your care team     Return in about 1 week (around 9/21/2017).      Your next 10 appointments already scheduled     Sep 22, 2017  9:45 AM CDT   Post-Op with John Lindsey MD   Eye Clinic ([facility])    [address] [facility]  [address]  9th [address] Clin 9a  [facility] [id number] [phone]              Who to contact     Please call your clinic at [phone] to:    Ask questions about your health    Make or cancel appointments    Discuss your medicines    Learn about your test results    Speak to your doctor   If you have compliments or concerns about an experience at your clinic, or if you wish to file a complaint, please contact [facility] Patient Relations at [phone] or email us at [email or web address]         Additional Information About Your Visit        MyChart Information     [employer] is an electronic gateway that provides easy, online access to your medical records. With [employer], you can request a clinic appointment, read your test results, renew a prescription or communicate with your care team.     To sign up for [employer] visit the website at [email or web address]   You will be asked to enter the access code listed below, as well as some personal information. Please follow the directions to create your username and password.     Your access code is: [email or web address]  Expires: 10/26/2017 11:01 AM     Your access code will   in 90 days. If you need help or a new code, please contact your [facility] or call [phone] for assistance.        Care EveryWhere ID     This is your Care EveryWhere ID. This could be used by other organizations to access your [place] medical records  [id number]         Blood Pressure from Last 3 Encounters:   17 121/82   17 122/88   17 111/77    Weight from Last 3 Encounters:   17 (!) 144.2 kg (318 lb)   17 (!) 143.8 kg (317 lb)   17 (!) 143.8 kg (317 lb)              Today, you had the following     No orders found for display       Primary Care Provider Office Phone # Fax #    Stevie Krueger [phone]       [facility] [address] [facility] [address]        Equal Access to Services     MUKUND AGARWAL : Hadii aad ku montezo Socolleen, waaxda luqadaha, qaybta kaalmada adejacoboyada, karen escobar . So [facility] [phone].    ATENCIÓN: Si habla español, tiene a martinez disposición servicios gratuitos de asistencia lingüística. Llame al [phone].    We comply with applicable federal civil rights laws and [place] laws. We do not discriminate on the basis of race, color, national origin, age, disability sex, sexual orientation or gender identity.            Thank you!     Thank you for choosing EYE CLINIC  for your care. Our goal is always to provide you with excellent care. Hearing back from our patients is one way we can continue to improve our services. Please take a few minutes to complete the written survey that you may receive in the mail after your visit with us. Thank you!             Your Updated Medication List - Protect others around you: Learn how to safely use, store and throw away your medicines at www.disposemymeds.org.          This list is accurate as of: 17  2:12 PM.  Always use your most recent med list.                   Brand Name Dispense  Instructions for use Diagnosis    acetaminophen 325 MG tablet    TYLENOL    250 tablet    Take 2 tablets by mouth every 6 hours as needed. Pain.    Upper GI bleed       aspirin 81 MG chewable tablet      Take 81 mg by mouth daily        brimonidine-timolol 0.2-0.5 % ophthalmic solution    COMBIGAN    1 Bottle    Place 1 drop into the right eye 2 times daily    Borderline glaucoma with ocular hypertension, right       calcium + D 600-200 MG-UNIT Tabs   Generic drug:  calcium carbonate-vitamin D      Take 1 tablet by mouth daily.        CELEBREX PO      Take 200 mg by mouth daily        FLUOXETINE HCL PO      Take 60 mg by mouth daily        ketorolac 0.5 % ophthalmic solution    ACULAR    1 Bottle    Place 1 drop into the right eye 4 times daily    Dislocated IOL (intraocular lens), anterior, right       lisinopril-hydrochlorothiazide 20-12.5 MG per tablet    PRINZIDE/ZESTORETIC     Take 1 tablet by mouth 2 times daily        metoprolol 200 MG 24 hr tablet    TOPROL-XL     Take 200 mg by mouth daily        NIFEDIPINE PO      Take 90 mg by mouth daily        ofloxacin 0.3 % ophthalmic solution    OCUFLOX    1 Bottle    Place 1 drop into the right eye every 4 hours    Dislocated IOL (intraocular lens), anterior, right       pantoprazole 40 MG EC tablet    PROTONIX    60 tablet    Take 1 tablet by mouth 2 times daily.    Upper GI bleed       pilocarpine 1 % ophthalmic solution    PILOCAR    1 Bottle    Place 1 drop into the right eye 2 times daily    Aftercare following surgery of a sensory organ, Dislocated intraocular lens, sequela       prednisoLONE acetate 1 % ophthalmic susp    PRED FORTE    1 Bottle    Place 1 drop into the right eye 4 times daily    Dislocated IOL (intraocular lens), anterior, right       * sodium chloride 5 % ophthalmic solution    DILLAN 128    1 Bottle    Place 1 drop into the right eye 4 times daily    Aftercare following surgery of a sensory organ       * sodium chloride 5 % ophthalmic  ointment    DILLAN 128    1 Tube    Place 2 Application (1 g) into the right eye At Bedtime    Aftercare following surgery of a sensory organ       * sodium chloride 5 % ophthalmic solution    DILLAN 128    1 Bottle    Place 1 drop into the right eye 4 times daily    Aftercare following surgery of a sensory organ       tamsulosin 0.4 MG capsule    FLOMAX     Take by mouth daily        VITAMIN B-12 PO           VITAMIN C PO      Take 500 mg by mouth.        vitamin D 2000 UNITS Caps      Take 1 tablet by mouth daily        ZOLPIDEM TARTRATE PO      Take 10 mg by mouth At Bedtime        * Notice:  This list has 3 medication(s) that are the same as other medications prescribed for you. Read the directions carefully, and ask your doctor or other care provider to review them with you.

## 2017-09-14 NOTE — PATIENT INSTRUCTIONS
Continue Prednisolone every 2 hours right eye (pink)    Start Ofloxacin at 4 times/day right eye  (tan)

## 2017-09-22 ENCOUNTER — OFFICE VISIT (OUTPATIENT)
Dept: OPHTHALMOLOGY | Facility: CLINIC | Age: 67
End: 2017-09-22
Attending: OPHTHALMOLOGY
Payer: COMMERCIAL

## 2017-09-22 DIAGNOSIS — H27.01 APHAKIA, RIGHT: ICD-10-CM

## 2017-09-22 DIAGNOSIS — H27.121 DISLOCATED IOL (INTRAOCULAR LENS), ANTERIOR, RIGHT: ICD-10-CM

## 2017-09-22 DIAGNOSIS — H21.89: Primary | ICD-10-CM

## 2017-09-22 DIAGNOSIS — Z48.810 AFTERCARE FOLLOWING SURGERY OF A SENSORY ORGAN: ICD-10-CM

## 2017-09-22 DIAGNOSIS — H40.051 BORDERLINE GLAUCOMA WITH OCULAR HYPERTENSION, RIGHT: ICD-10-CM

## 2017-09-22 DIAGNOSIS — T85.22XS DISLOCATED INTRAOCULAR LENS, SEQUELA: ICD-10-CM

## 2017-09-22 PROCEDURE — 99212 OFFICE O/P EST SF 10 MIN: CPT | Mod: ZF

## 2017-09-22 RX ORDER — PREDNISOLONE ACETATE 10 MG/ML
1 SUSPENSION/ DROPS OPHTHALMIC 3 TIMES DAILY
Qty: 1 BOTTLE | Refills: 1 | Status: SHIPPED | OUTPATIENT
Start: 2017-09-22 | End: 2017-11-07

## 2017-09-22 ASSESSMENT — VISUAL ACUITY
OD_SC+: -2
OS_SC: 20/50
METHOD: SNELLEN - LINEAR
OS_SC+: -1
OD_SC: 20/30
OS_PH_SC: 20/25 -1

## 2017-09-22 ASSESSMENT — TONOMETRY
OS_IOP_MMHG: 13
OD_IOP_MMHG: 11
IOP_METHOD: ICARE

## 2017-09-22 ASSESSMENT — SLIT LAMP EXAM - LIDS: COMMENTS: NORMAL

## 2017-09-22 NOTE — PROGRESS NOTES
CC: POW #1 IOL explantation    HPI: Leodan Yanez is a 67 year old male who presents for acute vision change. He is s/p IOL explant, anterior vitrectomy, iris fixated IOL OD.  He notes that today a sheet came over the right eye that was present when he woke up. Denies precipitating trauma.     POHx:   IOL explant, anterior vitrectomy, iris fixated IOL OD (8/14/17)   Anterior vitrectomy, intraocular lens repositioning with transcleral fixation right eye 5/12/17  IOL explantation, PPV, aphakic 9/13/17    Interval: Improved vision, pt very happy. Denies flashes, floaters, photophobia    Current Eye Medications:   Ketorolac 0.5% TID OD  Pred Forte q2h, TID OD   Pilocarpine BID, OD    Review of Testing:  NA    Assessment & Plan     Leodan Yanez is a 67 year old male with the following diagnoses:     1. S/p IOL explant OD POW#1  For recurrent of pigment dispersion     - VA 20/30, patient very happy  - trace pigmented cells in AC  - start Pred taper 3-2-1 x 1 week each OD  - stop Oflox QID OD  - central retina stable    - RTC in 3 weeks with MRx      RITESH Lynch  Cornea fellow      ~~~~~~~~~~~~~~~~~~~~~~~~~~~~~~~~~~~~~~~~~~~~~~~~~~~~~~~~~~~~~~~~    Complete documentation of historical and exam elements from today's encounter can be found in the full encounter summary report (not reduplicated in this progress note). I personally obtained the chief complaint(s) and history of present illness.  I confirmed and edited as necessary the review of systems, past medical/surgical history, family history, social history, and examination findings as documented by others.  I examined the patient myself, and I personally reviewed the relevant tests, images, and reports as documented above. I formulated and edited as necessary the assessment and plan and discussed the findings and management plan with the patient and family.     John Lindsey MD, MA  Director, Cornea & Anterior Segment  [facility]  Department of Ophthalmology & Visual Neuroscience

## 2017-09-22 NOTE — PATIENT INSTRUCTIONS
Right eye    Taper prednisolone 1%: 1 drop 3x/day for 1 week, then 2x/day for 1 week, 1x/day for 1 week and stop    Stop ofloxacin eyedrop

## 2017-09-22 NOTE — MR AVS SNAPSHOT
After Visit Summary   9/22/2017    Leodan Yanez    MRN: [id number]           Patient Information     Date Of Birth          1950        Visit Information        Provider Department      9/22/2017 9:45 AM John Lindsey MD [facility] Clinic        Today's Diagnoses     Iridodonesis    -  1    Aphakia, right        Dislocated intraocular lens, sequela - Right Eye        Borderline glaucoma with ocular hypertension, right        Aftercare following surgery of a sensory organ        Dislocated IOL (intraocular lens), anterior, right          Care Instructions    Right eye    Taper prednisolone 1%: 1 drop 3x/day for 1 week, then 2x/day for 1 week, 1x/day for 1 week and stop    Stop ofloxacin eyedrop          Follow-ups after your visit        Follow-up notes from your care team     Return in about 3 weeks (around 10/13/2017) for Follow Up, Refraction.      Your next 10 appointments already scheduled     Dec 18, 2017 11:45 AM CST   Post Op with Cayetano Vivar MD   [facility]    [address]  [facility] [address]   [phone]            Dec 21, 2017  2:15 PM CST   Post-Op with John Lindsey MD   Eye Clinic ([facility])    [address]  [address]   [facility] [address] [place] [address]   [phone]            Dec 29, 2017 10:00 AM CST   Post-Op with Adeel Tejeda DO   Eye Clinic ([facility])    [address]  [address]   [facility] [address] [place] [address]   [phone]            Jan 18, 2018  3:30 PM CST   Post-Op with John Lindsey MD   Eye Clinic ([facility])    [address]  [address]   [facility] [address] [place] [address]   [phone]              Who to contact     Please call your clinic at [phone] to:    Ask questions about your health    Make or cancel appointments    Discuss your medicines    Learn  about your test results    Speak to your doctor   If you have compliments or concerns about an experience at your clinic, or if you wish to file a complaint, please contact [facility] Patient Relations at [phone] or email us at [email or web address]         Additional Information About Your Visit        Loteda Information     Loteda is an electronic gateway that provides easy, online access to your medical records. With Loteda, you can request a clinic appointment, read your test results, renew a prescription or communicate with your care team.     To sign up for Loteda visit the website at [email or web address]   You will be asked to enter the access code listed below, as well as some personal information. Please follow the directions to create your username and password.     Your access code is: [email or web address]  Expires: 2018 10:31 AM     Your access code will  in 90 days. If you need help or a new code, please contact your [facility] or call [phone] for assistance.        Care EveryWhere ID     This is your Care EveryWhere ID. This could be used by other organizations to access your [facility] medical records  [id number]         Blood Pressure from Last 3 Encounters:   17 110/68   17 108/66   17 121/82    Weight from Last 3 Encounters:   17 (!) 141.1 kg (311 lb)   11/10/17 (!) 141.1 kg (311 lb)   17 (!) 144.2 kg (318 lb)              Today, you had the following     No orders found for display         Today's Medication Changes          These changes are accurate as of: 17 11:59 PM.  If you have any questions, ask your nurse or doctor.               These medicines have changed or have updated prescriptions.        Dose/Directions    prednisoLONE acetate 1 % ophthalmic susp   Commonly known as:  PRED FORTE   This may have changed:  when to take this   Used for:  Dislocated IOL  (intraocular lens), anterior, right        Dose:  1 drop   Place 1 drop into the right eye 3 times daily   Quantity:  1 Bottle   Refills:  1            Where to get your medicines      These medications were sent to [facility]/pharmacy #[id number] - [place], [place] - [address]  [address] [facility] [address]     Phone:  [phone]     prednisoLONE acetate 1 % ophthalmic susp                Primary Care Provider Office Phone # Fax #    Stevie Krueger [phone]       [facility] [id number]  [facility] [address]        Equal Access to Services     [facility]: Hadii lg leach hadtrevon Socolleen, waliya greenberg, qaybta kaalmaaz russell, karen escobar . So [facility] [phone].    ATENCIÓN: Si habla español, tiene a martinez disposición servicios gratuitos de asistencia lingüística. [facility] [phone].    We comply with applicable federal civil rights laws and [place] laws. We do not discriminate on the basis of race, color, national origin, age, disability, sex, sexual orientation, or gender identity.            Thank you!     Thank you for choosing EYE CLINIC  for your care. Our goal is always to provide you with excellent care. Hearing back from our patients is one way we can continue to improve our services. Please take a few minutes to complete the written survey that you may receive in the mail after your visit with us. Thank you!             Your Updated Medication List - Protect others around you: Learn how to safely use, store and throw away your medicines at www.disposemymeds.org.          This list is accurate as of: 9/22/17 11:59 PM.  Always use your most recent med list.                   Brand Name Dispense Instructions for use Diagnosis    acetaminophen 325 MG tablet    TYLENOL    250 tablet    Take 2 tablets by mouth every 6 hours as needed. Pain.    Upper GI bleed       aspirin 81 MG chewable tablet      Take 81 mg by mouth daily         CELEBREX PO      Take 200 mg by mouth every other day        FLUOXETINE HCL PO      Take 20 mg by mouth 2 times daily        lisinopril-hydrochlorothiazide 20-12.5 MG per tablet    PRINZIDE/ZESTORETIC     Take 1 tablet by mouth 2 times daily        metoprolol 200 MG 24 hr tablet    TOPROL-XL     Take 200 mg by mouth daily        NIFEDIPINE PO      Take 90 mg by mouth daily        prednisoLONE acetate 1 % ophthalmic susp    PRED FORTE    1 Bottle    Place 1 drop into the right eye 3 times daily    Dislocated IOL (intraocular lens), anterior, right       tamsulosin 0.4 MG capsule    FLOMAX     Take 0.4 mg by mouth daily

## 2017-09-22 NOTE — NURSING NOTE
Chief Complaints and History of Present Illnesses   Patient presents with     Post Op (Ophthalmology) Right Eye     Explant pow1     HPI    Symptoms:     Blurred vision   No decreased vision   No floaters   No flashes   No tearing   No photophobia      Duration:  1 week      Do you have eye pain now?:  No      Comments:  Doing well. Using drops as prescribed and seems to be better than before.    He inquired about glasses and told him we will hodl off for now.    Tawny PARKER 10:01 AM September 22, 2017

## 2017-10-10 DIAGNOSIS — T85.22XS DISLOCATED INTRAOCULAR LENS, SEQUELA: ICD-10-CM

## 2017-10-10 DIAGNOSIS — H40.039 ANATOMICAL NARROW ANGLE BORDERLINE GLAUCOMA: Primary | ICD-10-CM

## 2017-10-10 DIAGNOSIS — Z48.810 AFTERCARE FOLLOWING SURGERY OF A SENSORY ORGAN: ICD-10-CM

## 2017-10-10 RX ORDER — PILOCARPINE HYDROCHLORIDE 10 MG/ML
1 SOLUTION/ DROPS OPHTHALMIC 2 TIMES DAILY
Qty: 1 BOTTLE | Refills: 0 | Status: SHIPPED | OUTPATIENT
Start: 2017-10-10 | End: 2017-11-07

## 2017-10-10 NOTE — TELEPHONE ENCOUNTER
pilocarpine (PILOCAR) 1 % ophthalmic solution      Last Written Prescription Date:  8/22/2017  Last Fill Quantity: 1 bottle,   # refills: 1  Last Office Visit: 9/22/2017  Future Office visit:   10/24/2017  Last Note: 9/22/2017  Eye Clinic    Page, John Cano MD   Ophthalmology    Iridodonesis +5 more   Dx    Post Op (Ophthalmology) Right Eye ; Referred by Self, Referred, MD   Reason for visit    Progress Notes      CC: POW #1 IOL explantation     HPI: Leodan Yanez is a 67 year old male who presents for acute vision change. He is s/p IOL explant, anterior vitrectomy, iris fixated IOL OD.  He notes that today a sheet came over the right eye that was present when he woke up. Denies precipitating trauma.      POHx:   IOL explant, anterior vitrectomy, iris fixated IOL OD (8/14/17)   Anterior vitrectomy, intraocular lens repositioning with transcleral fixation right eye 5/12/17  IOL explantation, PPV, aphakic 9/13/17     Interval: Improved vision, pt very happy. Denies flashes, floaters, photophobia     Current Eye Medications:   Ketorolac 0.5% TID OD  Pred Forte q2h, TID OD   Pilocarpine BID, OD     Review of Testing:  NA     Assessment & Plan      Leodan Yanez is a 67 year old male with the following diagnoses:      1. S/p IOL explant OD POW#1  For recurrent of pigment dispersion      - VA 20/30, patient very happy  - trace pigmented cells in AC  - start Pred taper 3-2-1 x 1 week each OD  - stop Oflox QID OD  - central retina stable     - RTC in 3 weeks with MRx        RITESH Lynch  Cornea fellow        ~~~~~~~~~~~~~~~~~~~~~~~~~~~~~~~~~~~~~~~~~~~~~~~~~~~~~~~~~~~~~~~~     Complete documentation of historical and exam elements from today's encounter can be found in the full encounter summary report (not reduplicated in this progress note). I personally obtained the chief complaint(s) and history of present illness.  I confirmed and edited as necessary the review of systems, past medical/surgical history,  family history, social history, and examination findings as documented by others.  I examined the patient myself, and I personally reviewed the relevant tests, images, and reports as documented above. I formulated and edited as necessary the assessment and plan and discussed the findings and management plan with the patient and family.      John Lindsey MD, MA  Director, Cornea & Anterior Segment  [facility] Department of Ophthalmology & Visual Neuroscience               Refill sent for 1 bottle no added refills - patient has appointment in 2 weeks.

## 2017-10-24 ENCOUNTER — OFFICE VISIT (OUTPATIENT)
Dept: OPHTHALMOLOGY | Facility: CLINIC | Age: 67
End: 2017-10-24
Attending: OPHTHALMOLOGY
Payer: COMMERCIAL

## 2017-10-24 DIAGNOSIS — Z48.810 AFTERCARE FOLLOWING SURGERY OF A SENSORY ORGAN: Primary | ICD-10-CM

## 2017-10-24 DIAGNOSIS — H27.01 APHAKIA, RIGHT: ICD-10-CM

## 2017-10-24 DIAGNOSIS — H27.121 DISLOCATED IOL (INTRAOCULAR LENS), ANTERIOR, RIGHT: ICD-10-CM

## 2017-10-24 DIAGNOSIS — T85.22XA DISLOCATION OF INTRAOCULAR LENS, INITIAL ENCOUNTER: ICD-10-CM

## 2017-10-24 DIAGNOSIS — H21.89: ICD-10-CM

## 2017-10-24 PROCEDURE — 99213 OFFICE O/P EST LOW 20 MIN: CPT | Mod: ZF

## 2017-10-24 PROCEDURE — 92015 DETERMINE REFRACTIVE STATE: CPT | Mod: 52,ZF

## 2017-10-24 RX ORDER — OFLOXACIN 3 MG/ML
1 SOLUTION/ DROPS OPHTHALMIC 4 TIMES DAILY
Qty: 1 ML | Refills: 0 | Status: SHIPPED | OUTPATIENT
Start: 2017-10-24 | End: 2017-10-28

## 2017-10-24 ASSESSMENT — VISUAL ACUITY
OS_CC: 20/40
METHOD: SNELLEN - LINEAR
OD_SC: 20/25
OS_PH_CC: 20/25
CORRECTION_TYPE: GLASSES

## 2017-10-24 ASSESSMENT — SLIT LAMP EXAM - LIDS
COMMENTS: NORMAL
COMMENTS: NORMAL

## 2017-10-24 ASSESSMENT — CONF VISUAL FIELD
METHOD: COUNTING FINGERS
OD_NORMAL: 1
OS_NORMAL: 1

## 2017-10-24 ASSESSMENT — REFRACTION_MANIFEST
OD_CYLINDER: +0.75
OD_SPHERE: -0.50
OD_ADD: +2.50
OD_AXIS: 125

## 2017-10-24 ASSESSMENT — REFRACTION_WEARINGRX
OD_SPHERE: -2.25
OD_ADD: +2.75
OS_ADD: +2.75
OS_SPHERE: -1.25
OD_CYLINDER: +0.50
OD_AXIS: 073
OS_CYLINDER: +1.25
OS_AXIS: 016

## 2017-10-24 ASSESSMENT — TONOMETRY
OD_IOP_MMHG: 22
OS_IOP_MMHG: 18
IOP_METHOD: TONOPEN

## 2017-10-24 NOTE — Clinical Note
Doing well post intraocular lens explant in the right eye.  Has partial dislocation of left eye, planning to explant and leave him aphakic.  Do you need to be involved?  Previous history Retinal detachment  With SB in that eye.  MAP

## 2017-10-24 NOTE — PROGRESS NOTES
CC: POM #2 IOL explantation right eye; consultation for dislocating intraocular lens left eye     HPI: Leodan Yanez is a 67 year old male who presents for acute vision change. He is s/p IOL explant, anterior vitrectomy, iris fixated IOL OD.  He notes that today a sheet came over the right eye that was present when he woke up. Denies precipitating trauma.     POHx:   IOL explant, anterior vitrectomy, iris fixated IOL OD (8/14/17)   Anterior vitrectomy, intraocular lens repositioning with transcleral fixation right eye 5/12/17  IOL explantation, PPV, aphakic 9/13/17    Interval: Pleased with visual acuity. Patient notes that he has met his deductible and would like to proceed with procedure in the left eye. Denies flashes, floaters, photophobia.    Current Eye Medications:   Ketorolac 0.5% TID OD (not using)  Pilocarpine BID, OD (not using)    Review of Testing:  NA    Assessment & Plan     Leodan Yanez is a 67 year old male with the following diagnoses:     1. S/p IOL explant OD POM#2  For recurrence of pigment dispersion     - VA 20/25, patient very happy  - rare pigmented cells in AC  - loose sutures removed.   - start Ofloxacin QID OD for four days    2. Phacodonesis, left eye:   Patient with decentered IOL. He would like to proceed with surgery    Lens explant and leave aphakic   Biometry shows intraocular lens requirement to be -2.5, so would be moderately myopic postop .    Counseled re: R/B/A, moises with history of previous Retinal detachment  S/p repair    Schedule intraocular lens explant with vitrectomy  Anterior chamber maintainer  RBB    John Bruce MD  Ophthalmology, PGY-3        ~~~~~~~~~~~~~~~~~~~~~~~~~~~~~~~~~~~~~~~~~~~~~~~~~~~~~~~~~~~~~~~~    Complete documentation of historical and exam elements from today's encounter can be found in the full encounter summary report (not reduplicated in this progress note). I personally obtained the chief complaint(s) and history of present illness.  I  confirmed and edited as necessary the review of systems, past medical/surgical history, family history, social history, and examination findings as documented by others.  I examined the patient myself, and I personally reviewed the relevant tests, images, and reports as documented above. I formulated and edited as necessary the assessment and plan and discussed the findings and management plan with the patient and family.     John Lindsey MD, MA  Director, Cornea & Anterior Segment  [facility] Department of Ophthalmology & Visual Neuroscience

## 2017-10-24 NOTE — MR AVS SNAPSHOT
After Visit Summary   10/24/2017    Leodan Yanez    MRN: [id number]           Patient Information     Date Of Birth          1950        Visit Information        Provider Department      10/24/2017 3:00 PM John Lindsey MD [facility]        Today's Diagnoses     Aftercare following surgery of a sensory organ - Right Eye    -  1    Dislocated IOL (intraocular lens), anterior, right        Iridodonesis        Aphakia, right        Dislocation of intraocular lens, initial encounter           Follow-ups after your visit        Follow-up notes from your care team     Return for Follow Up.      Your next 10 appointments already scheduled     Nov 10, 2017   Procedure with Cayetano Vivar MD   [facility] (--)    [address]  [facility] [address]   [phone]            Nov 10, 2017  8:15 AM CST   [facility] Same Day Surgery with Cayetano Vivar MD, Sonali Morse PA-C   [facility] ([facility])    [facility])   [phone]            Dec 14, 2017  2:45 PM CST   Post-Op with John Lindsey MD   Eye Clinic ([facility])    [address]  [address]  [address]  [facility] [address] [phone]            Dec 21, 2017  2:45 PM CST   Post-Op with John Lindsey MD   Eye Clinic ([facility])    [address]  [facility] [address] [phone]            Jan 11, 2018  3:30 PM CST   Post-Op with John Lindsey MD   Eye Clinic ([facility])    [address]  [address]  [address]  [facility] [address]   [phone]              Who to contact     Please call your clinic at [phone] to:    Ask questions about your health    Make or cancel appointments    Discuss your medicines    Learn about your test results    Speak to  your doctor   If you have compliments or concerns about an experience at your clinic, or if you wish to file a complaint, please contact [facility] Patient Relations at [phone] or email us at [email or web address]         Additional Information About Your Visit        [employer] Information     Creditablet is an electronic gateway that provides easy, online access to your medical records. With Simple Star, you can request a clinic appointment, read your test results, renew a prescription or communicate with your care team.     To sign up for Simple Star visit the website at [email or web address]   You will be asked to enter the access code listed below, as well as some personal information. Please follow the directions to create your username and password.     Your access code is: [email or web address]  Expires: 2018 10:31 AM     Your access code will  in 90 days. If you need help or a new code, please contact your [facility] or call [phone] for assistance.        Care EveryWhere ID     This is your Care EveryWhere ID. This could be used by other organizations to access your [place] medical records  [id number]         Blood Pressure from Last 3 Encounters:   17 121/82   17 122/88   17 111/77    Weight from Last 3 Encounters:   17 (!) 140.6 kg (310 lb)   17 (!) 144.2 kg (318 lb)   17 (!) 143.8 kg (317 lb)              We Performed the Following     Asya-Operative Worksheet          Today's Medication Changes          These changes are accurate as of: 10/24/17 11:59 PM.  If you have any questions, ask your nurse or doctor.               Start taking these medicines.        Dose/Directions    ofloxacin 0.3 % ophthalmic solution   Commonly known as:  OCUFLOX   Used for:  Aftercare following surgery of a sensory organ   Started by:  John Lindsey MD        Dose:  1 drop   Place 1 drop into the right eye 4  times daily for 4 days   Quantity:  1 mL   Refills:  0            Where to get your medicines      These medications were sent to [facility]/pharmacy #[id number] - [place], [place] - [address]  [address], [facility] [address]     Phone:  [phone]     ofloxacin 0.3 % ophthalmic solution                Primary Care Provider Office Phone # Fax #    Stevie Krueger [phone]       [facility] [id number]  [facility] [address]        Equal Access to Services     [facility]: Hadii lg ku hadasho Soomaali, waaxda luqadaha, qaybta kaalmada adeegyada, karen escobar . So [facility] [phone].    ATENCIÓN: Si habla español, tiene a martinez disposición servicios gratuitos de asistencia lingüística. [facility] [phone].    We comply with applicable federal civil rights laws and [place] laws. We do not discriminate on the basis of race, color, national origin, age, disability, sex, sexual orientation, or gender identity.            Thank you!     Thank you for choosing EYE CLINIC  for your care. Our goal is always to provide you with excellent care. Hearing back from our patients is one way we can continue to improve our services. Please take a few minutes to complete the written survey that you may receive in the mail after your visit with us. Thank you!             Your Updated Medication List - Protect others around you: Learn how to safely use, store and throw away your medicines at www.disposemymeds.org.          This list is accurate as of: 10/24/17 11:59 PM.  Always use your most recent med list.                   Brand Name Dispense Instructions for use Diagnosis    acetaminophen 325 MG tablet    TYLENOL    250 tablet    Take 2 tablets by mouth every 6 hours as needed. Pain.    Upper GI bleed       aspirin 81 MG chewable tablet      Take 81 mg by mouth daily        CELEBREX PO      Take 200 mg by mouth daily        FLUOXETINE HCL PO      Take 20  mg by mouth 2 times daily        lisinopril-hydrochlorothiazide 20-12.5 MG per tablet    PRINZIDE/ZESTORETIC     Take 1 tablet by mouth 2 times daily        metoprolol 200 MG 24 hr tablet    TOPROL-XL     Take 200 mg by mouth daily        NIFEDIPINE PO      Take 90 mg by mouth daily        ofloxacin 0.3 % ophthalmic solution    OCUFLOX    1 mL    Place 1 drop into the right eye 4 times daily for 4 days    Aftercare following surgery of a sensory organ       pantoprazole 40 MG EC tablet    PROTONIX    60 tablet    Take 1 tablet by mouth 2 times daily.    Upper GI bleed       tamsulosin 0.4 MG capsule    FLOMAX     Take by mouth daily        VITAMIN B-12 PO

## 2017-10-24 NOTE — NURSING NOTE
Chief Complaints and History of Present Illnesses   Patient presents with     Post Op (Ophthalmology) Right Eye     S/p IOL explant      HPI    Affected eye(s):  Right   Symptoms:        Frequency:  Constant       Do you have eye pain now?:  No      Comments:  Feels that the va is better in the RE  Has questions about sx  Park Tinsley COT 3:02 PM October 24, 2017

## 2017-11-01 ENCOUNTER — TELEPHONE (OUTPATIENT)
Dept: OPHTHALMOLOGY | Facility: CLINIC | Age: 67
End: 2017-11-01

## 2017-11-01 ENCOUNTER — HOSPITAL ENCOUNTER (OUTPATIENT)
Facility: CLINIC | Age: 67
End: 2017-11-01
Attending: OPHTHALMOLOGY | Admitting: OPHTHALMOLOGY
Payer: COMMERCIAL

## 2017-11-02 ENCOUNTER — TELEPHONE (OUTPATIENT)
Dept: OPHTHALMOLOGY | Facility: CLINIC | Age: 67
End: 2017-11-02

## 2017-11-06 ENCOUNTER — TRANSFERRED RECORDS (OUTPATIENT)
Dept: HEALTH INFORMATION MANAGEMENT | Facility: CLINIC | Age: 67
End: 2017-11-06

## 2017-11-08 ENCOUNTER — TELEPHONE (OUTPATIENT)
Dept: OPHTHALMOLOGY | Facility: CLINIC | Age: 67
End: 2017-11-08

## 2017-11-08 NOTE — TELEPHONE ENCOUNTER
Reviewed on detailed voicemail per pt request glasses suggestions.  Reviewed good right eye vision without glasses at distance  Would recommended trying on over the counter readers at store.  Start with a +2.50 add and go up or down from there-- keeping desired reading distance the same    Provided direct triage number for further assistance  Landon Galicia RN 4:40 PM 11/08/17

## 2017-11-10 ENCOUNTER — ANESTHESIA (OUTPATIENT)
Dept: SURGERY | Facility: CLINIC | Age: 67
DRG: 354 | End: 2017-11-10
Payer: COMMERCIAL

## 2017-11-10 ENCOUNTER — HOSPITAL ENCOUNTER (INPATIENT)
Facility: CLINIC | Age: 67
LOS: 3 days | Discharge: HOME | DRG: 354 | End: 2017-11-13
Attending: SURGERY | Admitting: SURGERY
Payer: COMMERCIAL

## 2017-11-10 ENCOUNTER — APPOINTMENT (OUTPATIENT)
Dept: SURGERY | Facility: PHYSICIAN GROUP | Age: 67
End: 2017-11-10
Payer: COMMERCIAL

## 2017-11-10 ENCOUNTER — ANESTHESIA EVENT (OUTPATIENT)
Dept: SURGERY | Facility: CLINIC | Age: 67
DRG: 354 | End: 2017-11-10
Payer: COMMERCIAL

## 2017-11-10 DIAGNOSIS — G89.18 ACUTE POST-OPERATIVE PAIN: Primary | ICD-10-CM

## 2017-11-10 DIAGNOSIS — K59.03 DRUG-INDUCED CONSTIPATION: ICD-10-CM

## 2017-11-10 PROBLEM — K43.2 INCISIONAL HERNIA: Status: ACTIVE | Noted: 2017-11-10

## 2017-11-10 LAB
CREAT SERPL-MCNC: 0.97 MG/DL (ref 0.66–1.25)
GFR SERPL CREATININE-BSD FRML MDRD: 77 ML/MIN/1.7M2
PLATELET # BLD AUTO: 182 10E9/L (ref 150–450)

## 2017-11-10 PROCEDURE — 36415 COLL VENOUS BLD VENIPUNCTURE: CPT | Performed by: ANESTHESIOLOGY

## 2017-11-10 PROCEDURE — 49560 ZZHC REPAIR INCISIONAL HERNIA,REDUCIBLE: CPT | Performed by: SURGERY

## 2017-11-10 PROCEDURE — 37000008 ZZH ANESTHESIA TECHNICAL FEE, 1ST 30 MIN: Performed by: SURGERY

## 2017-11-10 PROCEDURE — 25000125 ZZHC RX 250: Performed by: NURSE ANESTHETIST, CERTIFIED REGISTERED

## 2017-11-10 PROCEDURE — C1781 MESH (IMPLANTABLE): HCPCS | Performed by: SURGERY

## 2017-11-10 PROCEDURE — 12000000 ZZH R&B MED SURG/OB

## 2017-11-10 PROCEDURE — 36000050 ZZH SURGERY LEVEL 2 1ST 30 MIN: Performed by: SURGERY

## 2017-11-10 PROCEDURE — 49568 ZZHC REPAIR HERNIA WITH MESH: CPT | Mod: AS | Performed by: PHYSICIAN ASSISTANT

## 2017-11-10 PROCEDURE — 25000566 ZZH SEVOFLURANE, EA 15 MIN: Performed by: SURGERY

## 2017-11-10 PROCEDURE — 15734 MUSCLE-SKIN GRAFT TRUNK: CPT | Mod: AS | Performed by: PHYSICIAN ASSISTANT

## 2017-11-10 PROCEDURE — 85049 AUTOMATED PLATELET COUNT: CPT | Performed by: ANESTHESIOLOGY

## 2017-11-10 PROCEDURE — 99222 1ST HOSP IP/OBS MODERATE 55: CPT | Performed by: PHYSICIAN ASSISTANT

## 2017-11-10 PROCEDURE — 49568 ZZHC REPAIR HERNIA WITH MESH: CPT | Performed by: SURGERY

## 2017-11-10 PROCEDURE — 40000306 ZZH STATISTIC PRE PROC ASSESS II: Performed by: SURGERY

## 2017-11-10 PROCEDURE — 82565 ASSAY OF CREATININE: CPT | Performed by: ANESTHESIOLOGY

## 2017-11-10 PROCEDURE — 0WUF07Z SUPPLEMENT ABDOMINAL WALL WITH AUTOLOGOUS TISSUE SUBSTITUTE, OPEN APPROACH: ICD-10-PCS | Performed by: SURGERY

## 2017-11-10 PROCEDURE — 25000128 H RX IP 250 OP 636: Performed by: NURSE ANESTHETIST, CERTIFIED REGISTERED

## 2017-11-10 PROCEDURE — 99207 ZZC CONSULT E&M CHANGED TO INITIAL LEVEL: CPT | Performed by: PHYSICIAN ASSISTANT

## 2017-11-10 PROCEDURE — 0KXL0Z1 TRANSFER LEFT ABDOMEN MUSCLE WITH SUBCUTANEOUS TISSUE, OPEN APPROACH: ICD-10-PCS | Performed by: SURGERY

## 2017-11-10 PROCEDURE — 37000009 ZZH ANESTHESIA TECHNICAL FEE, EACH ADDTL 15 MIN: Performed by: SURGERY

## 2017-11-10 PROCEDURE — 25000128 H RX IP 250 OP 636: Performed by: SURGERY

## 2017-11-10 PROCEDURE — 12000007 ZZH R&B INTERMEDIATE

## 2017-11-10 PROCEDURE — 25000125 ZZHC RX 250: Performed by: SURGERY

## 2017-11-10 PROCEDURE — 71000012 ZZH RECOVERY PHASE 1 LEVEL 1 FIRST HR: Performed by: SURGERY

## 2017-11-10 PROCEDURE — 25000128 H RX IP 250 OP 636: Performed by: ANESTHESIOLOGY

## 2017-11-10 PROCEDURE — 27210794 ZZH OR GENERAL SUPPLY STERILE: Performed by: SURGERY

## 2017-11-10 PROCEDURE — 71000013 ZZH RECOVERY PHASE 1 LEVEL 1 EA ADDTL HR: Performed by: SURGERY

## 2017-11-10 PROCEDURE — 15734 MUSCLE-SKIN GRAFT TRUNK: CPT | Performed by: SURGERY

## 2017-11-10 PROCEDURE — 49560 ZZHC REPAIR INCISIONAL HERNIA,REDUCIBLE: CPT | Mod: AS | Performed by: PHYSICIAN ASSISTANT

## 2017-11-10 PROCEDURE — 0WUF0JZ SUPPLEMENT ABDOMINAL WALL WITH SYNTHETIC SUBSTITUTE, OPEN APPROACH: ICD-10-PCS | Performed by: SURGERY

## 2017-11-10 PROCEDURE — 25000132 ZZH RX MED GY IP 250 OP 250 PS 637: Performed by: SURGERY

## 2017-11-10 PROCEDURE — 36000052 ZZH SURGERY LEVEL 2 EA 15 ADDTL MIN: Performed by: SURGERY

## 2017-11-10 PROCEDURE — 0KXK0Z1 TRANSFER RIGHT ABDOMEN MUSCLE WITH SUBCUTANEOUS TISSUE, OPEN APPROACH: ICD-10-PCS | Performed by: SURGERY

## 2017-11-10 DEVICE — IMPLANTABLE DEVICE: Type: IMPLANTABLE DEVICE | Site: ABDOMEN | Status: FUNCTIONAL

## 2017-11-10 RX ORDER — EPHEDRINE SULFATE 50 MG/ML
INJECTION, SOLUTION INTRAMUSCULAR; INTRAVENOUS; SUBCUTANEOUS PRN
Status: DISCONTINUED | OUTPATIENT
Start: 2017-11-10 | End: 2017-11-10

## 2017-11-10 RX ORDER — DEXAMETHASONE SODIUM PHOSPHATE 4 MG/ML
INJECTION, SOLUTION INTRA-ARTICULAR; INTRALESIONAL; INTRAMUSCULAR; INTRAVENOUS; SOFT TISSUE PRN
Status: DISCONTINUED | OUTPATIENT
Start: 2017-11-10 | End: 2017-11-10

## 2017-11-10 RX ORDER — LIDOCAINE 40 MG/G
CREAM TOPICAL
Status: DISCONTINUED | OUTPATIENT
Start: 2017-11-10 | End: 2017-11-10 | Stop reason: HOSPADM

## 2017-11-10 RX ORDER — METOPROLOL SUCCINATE 100 MG/1
200 TABLET, EXTENDED RELEASE ORAL DAILY
Status: DISCONTINUED | OUTPATIENT
Start: 2017-11-11 | End: 2017-11-13 | Stop reason: HOSPADM

## 2017-11-10 RX ORDER — ONDANSETRON 2 MG/ML
INJECTION INTRAMUSCULAR; INTRAVENOUS PRN
Status: DISCONTINUED | OUTPATIENT
Start: 2017-11-10 | End: 2017-11-10

## 2017-11-10 RX ORDER — HEPARIN SODIUM 5000 [USP'U]/.5ML
5000 INJECTION, SOLUTION INTRAVENOUS; SUBCUTANEOUS EVERY 8 HOURS
Status: DISCONTINUED | OUTPATIENT
Start: 2017-11-11 | End: 2017-11-13 | Stop reason: HOSPADM

## 2017-11-10 RX ORDER — PANTOPRAZOLE SODIUM 40 MG/1
40 TABLET, DELAYED RELEASE ORAL DAILY
Status: DISCONTINUED | OUTPATIENT
Start: 2017-11-10 | End: 2017-11-10 | Stop reason: CLARIF

## 2017-11-10 RX ORDER — HYDROMORPHONE HYDROCHLORIDE 1 MG/ML
.3-.5 INJECTION, SOLUTION INTRAMUSCULAR; INTRAVENOUS; SUBCUTANEOUS EVERY 10 MIN PRN
Status: DISCONTINUED | OUTPATIENT
Start: 2017-11-10 | End: 2017-11-10 | Stop reason: HOSPADM

## 2017-11-10 RX ORDER — TAMSULOSIN HYDROCHLORIDE 0.4 MG/1
0.4 CAPSULE ORAL DAILY
Status: DISCONTINUED | OUTPATIENT
Start: 2017-11-11 | End: 2017-11-13 | Stop reason: HOSPADM

## 2017-11-10 RX ORDER — SODIUM CHLORIDE, SODIUM LACTATE, POTASSIUM CHLORIDE, CALCIUM CHLORIDE 600; 310; 30; 20 MG/100ML; MG/100ML; MG/100ML; MG/100ML
INJECTION, SOLUTION INTRAVENOUS CONTINUOUS
Status: DISCONTINUED | OUTPATIENT
Start: 2017-11-10 | End: 2017-11-10 | Stop reason: HOSPADM

## 2017-11-10 RX ORDER — LIDOCAINE 40 MG/G
CREAM TOPICAL
Status: DISCONTINUED | OUTPATIENT
Start: 2017-11-10 | End: 2017-11-13 | Stop reason: HOSPADM

## 2017-11-10 RX ORDER — DIPHENHYDRAMINE HYDROCHLORIDE 50 MG/ML
12.5 INJECTION INTRAMUSCULAR; INTRAVENOUS EVERY 6 HOURS PRN
Status: DISCONTINUED | OUTPATIENT
Start: 2017-11-10 | End: 2017-11-13 | Stop reason: HOSPADM

## 2017-11-10 RX ORDER — LISINOPRIL AND HYDROCHLOROTHIAZIDE 12.5; 2 MG/1; MG/1
1 TABLET ORAL 2 TIMES DAILY
Status: DISCONTINUED | OUTPATIENT
Start: 2017-11-10 | End: 2017-11-10

## 2017-11-10 RX ORDER — ONDANSETRON 2 MG/ML
4 INJECTION INTRAMUSCULAR; INTRAVENOUS EVERY 6 HOURS PRN
Status: DISCONTINUED | OUTPATIENT
Start: 2017-11-10 | End: 2017-11-13 | Stop reason: HOSPADM

## 2017-11-10 RX ORDER — PROCHLORPERAZINE MALEATE 5 MG
5 TABLET ORAL EVERY 6 HOURS PRN
Status: DISCONTINUED | OUTPATIENT
Start: 2017-11-10 | End: 2017-11-13 | Stop reason: HOSPADM

## 2017-11-10 RX ORDER — FENTANYL CITRATE 50 UG/ML
INJECTION, SOLUTION INTRAMUSCULAR; INTRAVENOUS PRN
Status: DISCONTINUED | OUTPATIENT
Start: 2017-11-10 | End: 2017-11-10

## 2017-11-10 RX ORDER — ONDANSETRON 4 MG/1
4 TABLET, ORALLY DISINTEGRATING ORAL EVERY 30 MIN PRN
Status: DISCONTINUED | OUTPATIENT
Start: 2017-11-10 | End: 2017-11-10 | Stop reason: HOSPADM

## 2017-11-10 RX ORDER — NIFEDIPINE 90 MG/1
90 TABLET, EXTENDED RELEASE ORAL DAILY
Status: DISCONTINUED | OUTPATIENT
Start: 2017-11-11 | End: 2017-11-13 | Stop reason: HOSPADM

## 2017-11-10 RX ORDER — PROPOFOL 10 MG/ML
INJECTION, EMULSION INTRAVENOUS PRN
Status: DISCONTINUED | OUTPATIENT
Start: 2017-11-10 | End: 2017-11-10

## 2017-11-10 RX ORDER — NALOXONE HYDROCHLORIDE 0.4 MG/ML
.1-.4 INJECTION, SOLUTION INTRAMUSCULAR; INTRAVENOUS; SUBCUTANEOUS
Status: DISCONTINUED | OUTPATIENT
Start: 2017-11-10 | End: 2017-11-10 | Stop reason: HOSPADM

## 2017-11-10 RX ORDER — NALOXONE HYDROCHLORIDE 0.4 MG/ML
.1-.4 INJECTION, SOLUTION INTRAMUSCULAR; INTRAVENOUS; SUBCUTANEOUS
Status: DISCONTINUED | OUTPATIENT
Start: 2017-11-10 | End: 2017-11-13 | Stop reason: HOSPADM

## 2017-11-10 RX ORDER — OXYCODONE HYDROCHLORIDE 5 MG/1
5-10 TABLET ORAL EVERY 4 HOURS PRN
Status: DISCONTINUED | OUTPATIENT
Start: 2017-11-10 | End: 2017-11-13

## 2017-11-10 RX ORDER — DIPHENHYDRAMINE HCL 12.5MG/5ML
12.5 LIQUID (ML) ORAL EVERY 6 HOURS PRN
Status: DISCONTINUED | OUTPATIENT
Start: 2017-11-10 | End: 2017-11-13 | Stop reason: HOSPADM

## 2017-11-10 RX ORDER — FENTANYL CITRATE 50 UG/ML
25-50 INJECTION, SOLUTION INTRAMUSCULAR; INTRAVENOUS
Status: CANCELLED | OUTPATIENT
Start: 2017-11-10

## 2017-11-10 RX ORDER — LIDOCAINE HYDROCHLORIDE 10 MG/ML
INJECTION, SOLUTION INFILTRATION; PERINEURAL PRN
Status: DISCONTINUED | OUTPATIENT
Start: 2017-11-10 | End: 2017-11-10

## 2017-11-10 RX ORDER — GLYCINE 1.5 G/100ML
SOLUTION IRRIGATION PRN
Status: DISCONTINUED | OUTPATIENT
Start: 2017-11-10 | End: 2017-11-10 | Stop reason: HOSPADM

## 2017-11-10 RX ORDER — ACETAMINOPHEN 325 MG/1
650 TABLET ORAL EVERY 6 HOURS PRN
Status: DISCONTINUED | OUTPATIENT
Start: 2017-11-10 | End: 2017-11-10

## 2017-11-10 RX ORDER — ACETAMINOPHEN 325 MG/1
975 TABLET ORAL EVERY 8 HOURS
Status: DISCONTINUED | OUTPATIENT
Start: 2017-11-10 | End: 2017-11-11

## 2017-11-10 RX ORDER — ONDANSETRON 2 MG/ML
4 INJECTION INTRAMUSCULAR; INTRAVENOUS EVERY 30 MIN PRN
Status: DISCONTINUED | OUTPATIENT
Start: 2017-11-10 | End: 2017-11-10 | Stop reason: HOSPADM

## 2017-11-10 RX ORDER — ACETAMINOPHEN 325 MG/1
650 TABLET ORAL EVERY 4 HOURS PRN
Status: DISCONTINUED | OUTPATIENT
Start: 2017-11-13 | End: 2017-11-13 | Stop reason: HOSPADM

## 2017-11-10 RX ORDER — BUPIVACAINE HYDROCHLORIDE AND EPINEPHRINE 2.5; 5 MG/ML; UG/ML
INJECTION, SOLUTION EPIDURAL; INFILTRATION; INTRACAUDAL; PERINEURAL PRN
Status: DISCONTINUED | OUTPATIENT
Start: 2017-11-10 | End: 2017-11-10 | Stop reason: HOSPADM

## 2017-11-10 RX ORDER — NEOSTIGMINE METHYLSULFATE 1 MG/ML
VIAL (ML) INJECTION PRN
Status: DISCONTINUED | OUTPATIENT
Start: 2017-11-10 | End: 2017-11-10

## 2017-11-10 RX ORDER — CEFAZOLIN SODIUM 1 G/3ML
1 INJECTION, POWDER, FOR SOLUTION INTRAMUSCULAR; INTRAVENOUS SEE ADMIN INSTRUCTIONS
Status: DISCONTINUED | OUTPATIENT
Start: 2017-11-10 | End: 2017-11-10 | Stop reason: HOSPADM

## 2017-11-10 RX ORDER — ONDANSETRON 4 MG/1
4 TABLET, ORALLY DISINTEGRATING ORAL EVERY 6 HOURS PRN
Status: DISCONTINUED | OUTPATIENT
Start: 2017-11-10 | End: 2017-11-13 | Stop reason: HOSPADM

## 2017-11-10 RX ORDER — MEPERIDINE HYDROCHLORIDE 25 MG/ML
12.5 INJECTION INTRAMUSCULAR; INTRAVENOUS; SUBCUTANEOUS
Status: DISCONTINUED | OUTPATIENT
Start: 2017-11-10 | End: 2017-11-10 | Stop reason: HOSPADM

## 2017-11-10 RX ORDER — PROPOFOL 10 MG/ML
INJECTION, EMULSION INTRAVENOUS CONTINUOUS PRN
Status: DISCONTINUED | OUTPATIENT
Start: 2017-11-10 | End: 2017-11-10

## 2017-11-10 RX ORDER — LISINOPRIL AND HYDROCHLOROTHIAZIDE 12.5; 2 MG/1; MG/1
1 TABLET ORAL 2 TIMES DAILY
Status: DISCONTINUED | OUTPATIENT
Start: 2017-11-11 | End: 2017-11-13 | Stop reason: HOSPADM

## 2017-11-10 RX ORDER — HYDRALAZINE HYDROCHLORIDE 20 MG/ML
10 INJECTION INTRAMUSCULAR; INTRAVENOUS EVERY 4 HOURS PRN
Status: DISCONTINUED | OUTPATIENT
Start: 2017-11-10 | End: 2017-11-13 | Stop reason: HOSPADM

## 2017-11-10 RX ORDER — GLYCOPYRROLATE 0.2 MG/ML
INJECTION, SOLUTION INTRAMUSCULAR; INTRAVENOUS PRN
Status: DISCONTINUED | OUTPATIENT
Start: 2017-11-10 | End: 2017-11-10

## 2017-11-10 RX ORDER — FENTANYL CITRATE 50 UG/ML
25-50 INJECTION, SOLUTION INTRAMUSCULAR; INTRAVENOUS
Status: DISCONTINUED | OUTPATIENT
Start: 2017-11-10 | End: 2017-11-10 | Stop reason: HOSPADM

## 2017-11-10 RX ORDER — CEFAZOLIN SODIUM 1 G/50ML
3 SOLUTION INTRAVENOUS
Status: COMPLETED | OUTPATIENT
Start: 2017-11-10 | End: 2017-11-10

## 2017-11-10 RX ORDER — SODIUM CHLORIDE 9 MG/ML
INJECTION, SOLUTION INTRAVENOUS CONTINUOUS
Status: DISCONTINUED | OUTPATIENT
Start: 2017-11-10 | End: 2017-11-13 | Stop reason: HOSPADM

## 2017-11-10 RX ADMIN — FENTANYL CITRATE 50 MCG: 50 INJECTION, SOLUTION INTRAMUSCULAR; INTRAVENOUS at 11:46

## 2017-11-10 RX ADMIN — ROCURONIUM BROMIDE 50 MG: 10 INJECTION INTRAVENOUS at 08:45

## 2017-11-10 RX ADMIN — PROPOFOL 30 MCG/KG/MIN: 10 INJECTION, EMULSION INTRAVENOUS at 09:00

## 2017-11-10 RX ADMIN — DEXAMETHASONE SODIUM PHOSPHATE 4 MG: 4 INJECTION, SOLUTION INTRA-ARTICULAR; INTRALESIONAL; INTRAMUSCULAR; INTRAVENOUS; SOFT TISSUE at 08:32

## 2017-11-10 RX ADMIN — Medication 1 G: at 10:15

## 2017-11-10 RX ADMIN — Medication 5 MG: at 09:00

## 2017-11-10 RX ADMIN — HYDROMORPHONE HYDROCHLORIDE 0.5 MG: 1 INJECTION, SOLUTION INTRAMUSCULAR; INTRAVENOUS; SUBCUTANEOUS at 12:39

## 2017-11-10 RX ADMIN — ROCURONIUM BROMIDE 10 MG: 10 INJECTION INTRAVENOUS at 10:49

## 2017-11-10 RX ADMIN — ONDANSETRON 4 MG: 2 INJECTION INTRAMUSCULAR; INTRAVENOUS at 11:15

## 2017-11-10 RX ADMIN — PROPOFOL 200 MG: 10 INJECTION, EMULSION INTRAVENOUS at 08:32

## 2017-11-10 RX ADMIN — ROCURONIUM BROMIDE 10 MG: 10 INJECTION INTRAVENOUS at 09:00

## 2017-11-10 RX ADMIN — PHENYLEPHRINE HYDROCHLORIDE 100 MCG: 10 INJECTION, SOLUTION INTRAMUSCULAR; INTRAVENOUS; SUBCUTANEOUS at 09:15

## 2017-11-10 RX ADMIN — ACETAMINOPHEN 975 MG: 325 TABLET, FILM COATED ORAL at 16:15

## 2017-11-10 RX ADMIN — SODIUM CHLORIDE: 9 INJECTION, SOLUTION INTRAVENOUS at 22:55

## 2017-11-10 RX ADMIN — LIDOCAINE HYDROCHLORIDE 50 MG: 10 INJECTION, SOLUTION INFILTRATION; PERINEURAL at 08:32

## 2017-11-10 RX ADMIN — SODIUM CHLORIDE, POTASSIUM CHLORIDE, SODIUM LACTATE AND CALCIUM CHLORIDE: 600; 310; 30; 20 INJECTION, SOLUTION INTRAVENOUS at 09:00

## 2017-11-10 RX ADMIN — FENTANYL CITRATE 50 MCG: 50 INJECTION INTRAMUSCULAR; INTRAVENOUS at 12:38

## 2017-11-10 RX ADMIN — FENTANYL CITRATE 50 MCG: 50 INJECTION, SOLUTION INTRAMUSCULAR; INTRAVENOUS at 10:51

## 2017-11-10 RX ADMIN — SODIUM CHLORIDE, POTASSIUM CHLORIDE, SODIUM LACTATE AND CALCIUM CHLORIDE: 600; 310; 30; 20 INJECTION, SOLUTION INTRAVENOUS at 09:30

## 2017-11-10 RX ADMIN — ROCURONIUM BROMIDE 10 MG: 10 INJECTION INTRAVENOUS at 11:06

## 2017-11-10 RX ADMIN — FENTANYL CITRATE 250 MCG: 50 INJECTION, SOLUTION INTRAMUSCULAR; INTRAVENOUS at 08:32

## 2017-11-10 RX ADMIN — Medication 10 MG: at 10:47

## 2017-11-10 RX ADMIN — ROCURONIUM BROMIDE 10 MG: 10 INJECTION INTRAVENOUS at 10:15

## 2017-11-10 RX ADMIN — SODIUM CHLORIDE: 9 INJECTION, SOLUTION INTRAVENOUS at 15:20

## 2017-11-10 RX ADMIN — ROCURONIUM BROMIDE 10 MG: 10 INJECTION INTRAVENOUS at 10:00

## 2017-11-10 RX ADMIN — GLYCOPYRROLATE 0.3 MG: 0.2 INJECTION, SOLUTION INTRAMUSCULAR; INTRAVENOUS at 11:51

## 2017-11-10 RX ADMIN — PHENYLEPHRINE HYDROCHLORIDE 200 MCG: 10 INJECTION, SOLUTION INTRAMUSCULAR; INTRAVENOUS; SUBCUTANEOUS at 09:30

## 2017-11-10 RX ADMIN — Medication 3 MG: at 11:51

## 2017-11-10 RX ADMIN — SODIUM CHLORIDE, POTASSIUM CHLORIDE, SODIUM LACTATE AND CALCIUM CHLORIDE: 600; 310; 30; 20 INJECTION, SOLUTION INTRAVENOUS at 10:38

## 2017-11-10 RX ADMIN — GLYCOPYRROLATE 0.2 MG: 0.2 INJECTION, SOLUTION INTRAMUSCULAR; INTRAVENOUS at 09:15

## 2017-11-10 RX ADMIN — ROCURONIUM BROMIDE 10 MG: 10 INJECTION INTRAVENOUS at 09:30

## 2017-11-10 RX ADMIN — Medication 3 G: at 08:30

## 2017-11-10 RX ADMIN — ROCURONIUM BROMIDE 5 MG: 10 INJECTION INTRAVENOUS at 08:32

## 2017-11-10 RX ADMIN — FENTANYL CITRATE 50 MCG: 50 INJECTION INTRAMUSCULAR; INTRAVENOUS at 12:50

## 2017-11-10 RX ADMIN — ROCURONIUM BROMIDE 10 MG: 10 INJECTION INTRAVENOUS at 09:46

## 2017-11-10 RX ADMIN — PHENYLEPHRINE HYDROCHLORIDE 100 MCG: 10 INJECTION, SOLUTION INTRAMUSCULAR; INTRAVENOUS; SUBCUTANEOUS at 10:30

## 2017-11-10 RX ADMIN — HYDROMORPHONE HYDROCHLORIDE: 10 INJECTION, SOLUTION INTRAMUSCULAR; INTRAVENOUS; SUBCUTANEOUS at 12:59

## 2017-11-10 RX ADMIN — MIDAZOLAM HYDROCHLORIDE 2 MG: 1 INJECTION, SOLUTION INTRAMUSCULAR; INTRAVENOUS at 08:30

## 2017-11-10 RX ADMIN — CEFAZOLIN SODIUM 1 G: 1 INJECTION, SOLUTION INTRAVENOUS at 19:31

## 2017-11-10 RX ADMIN — SODIUM CHLORIDE, POTASSIUM CHLORIDE, SODIUM LACTATE AND CALCIUM CHLORIDE: 600; 310; 30; 20 INJECTION, SOLUTION INTRAVENOUS at 08:30

## 2017-11-10 RX ADMIN — Medication 140 MG: at 08:32

## 2017-11-10 RX ADMIN — PHENYLEPHRINE HYDROCHLORIDE 100 MCG: 10 INJECTION, SOLUTION INTRAMUSCULAR; INTRAVENOUS; SUBCUTANEOUS at 08:45

## 2017-11-10 ASSESSMENT — ACTIVITIES OF DAILY LIVING (ADL): ADLS_ACUITY_SCORE: 12

## 2017-11-10 ASSESSMENT — ENCOUNTER SYMPTOMS: DYSRHYTHMIAS: 1

## 2017-11-10 NOTE — PLAN OF CARE
Problem: Patient Care Overview  Goal: Plan of Care/Patient Progress Review  Outcome: No Change  Pt alert and oriented. Abdominal inc. C, d, I. PCA for pain. C/O 6/10 pain. Linder, 2 bulb drains. R & L.   Report called to 5th floor.

## 2017-11-10 NOTE — OR NURSING
"CARLO PACU-to-NURSING HANDOFF    Procedure:  Procedure(s):  Incisional hernia Repair with mesh and component separation - Wound Class: I-Clean   {NON-OPERATIVE PROCEDURES:100142  Last Vitals: Blood pressure 132/86, pulse 67, temperature 97.3  F (36.3  C), temperature source Temporal, resp. rate 8, height 1.854 m (6' 1\"), weight (!) 141.1 kg (311 lb), SpO2 97 %.   Reason patient is transferring to unit: requires extended recovery per MD order.  Can discharge home once goals are met: No. Notify MD once criteria is met.   Prescriptions: No new prescriptions written    Pain Management:: Last IV pain medication given at 12:50 PM  Pain Control: Good.  Rating pain 5/10 and states is tolerable.     Incision Site: Clean, dry, and intact  Antiemetic Management: none given in PACU.  Received Decadron and Zofran in OR.   IV Fluid total: 3700 mL  Elimination Status: Urethral catheter (rhodes) in place; refer to orders to discontinue as per protocol       Discharge Teaching: Started in PACU  Patient has a Ride Home:  Yes.   Patient has someone to stay with them 24 hours after the procedure:  Yes.     PACU Nurse Name/Phone Number: Danielle Enciso,   1:16 PM    Above PACU Nurse Handoff Report was reviewed: Yes  Reviewed by:   Jennifer Oliva RN  "

## 2017-11-10 NOTE — CONSULTS
[facility]    Hospitalist Consultation    Date of Admission:  11/10/2017  Date of Consult (When I saw the patient): 11/10/17    Assessment & Plan   Leodan Yanez is a 67 year old male who was admitted on 11/10/2017. I was asked to see the patient for medical management following a ventral hernia repair    Hypertension  Paroxysmal atrial fibrillation, CHADS2-VASC = 2  --slightly  Hypertensive at current time.  Took all BP meds this AM including both doses of lisinopril-HCTZ (usually splits dose).  Continue PTA lisinopril-HCTZ, nifedipine, Toprol XL.    --hold aspirin until cleared by primary team to resume.  PCP notes intention to discuss anticoagulation at follow-up  --add PRN hydralazine for SBP>180, DBP >110    Obesity, BMI 41  Obstructive sleep apnea  --continue CPAP at home settings    Ventral hernia repair 11/10/2017  --management including DVT prophylaxis, diet, pain management, activity deferred to primary team.    History of PUD: continue PPI  BPH: continue tamsulosin  Mood disorder: continue PTA fluoxetine    Lines: rhodes, remove when indicated from surgical perspective (defer to primary team)    Disposition: Likely discharge 11/11/2017 to home, deferred to primary team    Ivy Mckeon PA-C    Reason for Consult   Reason for consult: I was asked by Dr. Vivar  to evaluate this patient for hypertension, atrial fibrillation, and MONSE.    Primary Care Physician   Stevie Krueger    Chief Complaint   Ventral hernia repair    History is obtained from the patient    History of Present Illness   Leodan Yanez is a 67 year old male who is admitted following an elective ventral hernia repair.  He denies dyspnea, chest pain.  Abdominal incisional pain is 5-6/10.  Denies nausea post-op.  Took all of his medications this morning.  Worried about his current blood pressure.     Past Medical History    Obesity  MONSE  Hypertension  Paroxysmal atrial fibrillation  PUD  Ventral hernia  BPH  Mood  disorder    Past Surgical History   Right TKA  Laparoscopic repair of gastric ulcer  Gastric bypass surgery  Right eye surgery - vitrectomy, reposition intraocular lens    Prior to Admission Medications   Prior to Admission Medications   Prescriptions Last Dose Informant Patient Reported? Taking?   Celecoxib (CELEBREX PO) Past Week at Unknown time Self Yes Yes   Sig: Take 200 mg by mouth every other day    FLUOXETINE HCL PO 11/10/2017 at 0600 Self Yes Yes   Sig: Take 20 mg by mouth 2 times daily    NIFEDIPINE PO 11/10/2017 at 0600 Self Yes Yes   Sig: Take 90 mg by mouth daily   acetaminophen (TYLENOL) 325 MG tablet Past Week at Unknown time Self No Yes   Sig: Take 2 tablets by mouth every 6 hours as needed. Pain.   aspirin 81 MG chewable tablet Past Week at Unknown time Self Yes Yes   Sig: Take 81 mg by mouth daily   lisinopril-hydrochlorothiazide (PRINZIDE/ZESTORETIC) 20-12.5 MG per tablet 11/10/2017 at 0600 Self Yes Yes   Sig: Take 1 tablet by mouth 2 times daily   metoprolol (TOPROL-XL) 200 MG 24 hr tablet 11/10/2017 at 0600 Self Yes Yes   Sig: Take 200 mg by mouth daily   tamsulosin (FLOMAX) 0.4 MG capsule 11/10/2017 at a.m Self Yes Yes   Sig: Take 0.4 mg by mouth daily       Facility-Administered Medications: None     Allergies   No Known Allergies    Social History   Lives with wife.  Former .  No alcohol, tobacco, or drug use.     Family History   Family history reviewed with patient and is noncontributory.    Review of Systems   The 10 point Review of Systems is negative other than noted in the HPI or here.     Physical Exam   Temp: 96.3  F (35.7  C) Temp src: Oral BP: 159/89 Pulse: 67 Heart Rate: 69 Resp: 14 SpO2: 97 % O2 Device: Nasal cannula Oxygen Delivery: 2 LPM  Vital Signs with Ranges  Temp:  [95.2  F (35.1  C)-97.7  F (36.5  C)] 96.3  F (35.7  C)  Pulse:  [67] 67  Heart Rate:  [64-80] 69  Resp:  [8-20] 14  BP: (109-169)/() 159/89  FiO2 (%):  [100 %] 100 %  SpO2:  [87 %-100 %] 97  %  311 lbs 0 oz    Constitutional: Nontoxic appearing man in no distress  Eyes: nonicteric  HEENT: no thrush or lesions, edentulous on top  Respiratory: clear to auscultation bilaterally  Cardiovascular: RRR, no murmurs  GI: normoactive bowel sounds, midline incision is dressed, no significant drainage on dressings, abdominal binder in place, appropriately tender  Lymph/Hematologic: no cervical lymphadenopathy  Genitourinary: rhodes catheter in placed  Skin: warm and dry  Musculoskeletal: normal muscle bulk and tone  Neurologic: grossly nonfocal  Psychiatric: alert, oriented, appropriate    Data   -Data reviewed today: All pertinent laboratory and imaging results from this encounter were reviewed. I personally reviewed no images or EKG's today.    Recent Labs  Lab 11/10/17  0725   CR 0.97       Imaging:  No results found for this or any previous visit (from the past 24 hour(s)).

## 2017-11-10 NOTE — PHARMACY-ADMISSION MEDICATION HISTORY
Admission medication history interview status for this patient is complete. See [facility] admission navigator for allergy information, prior to admission medications and immunization status.     Medication history interview source(s):Patient  Medication history resources (including written lists, pill bottles, clinic record):[facility]  Primary pharmacy:[facility]    Changes made to PTA medication list:  Added: none  Deleted: B12 tab  Changed: celebrex to qod    Actions taken by pharmacist (provider contacted, etc):As above     Additional medication history information:None    Medication reconciliation/reorder completed by provider prior to medication history? No    For patients on insulin therapy: NO    Lantus/levemir/NPH/Mix 70/30 dose: _____ in AM/PM or twice daily   Sliding scale Novolog Y/N   If Yes, do you have a baseline novolog pre-meal dose: ______units with meals   Patients eat three meals a day: Y/N   Any Barriers to therapy: cost of medications/comfortable with giving injections (if applicable)/ comfortable and confident with current diabetes regimen     Prior to Admission medications    Medication Sig Last Dose Taking? Auth Provider   aspirin 81 MG chewable tablet Take 81 mg by mouth daily Past Week at Unknown time Yes Reported, Patient   NIFEDIPINE PO Take 90 mg by mouth daily 11/10/2017 at 0600 Yes Reported, Patient   metoprolol (TOPROL-XL) 200 MG 24 hr tablet Take 200 mg by mouth daily 11/10/2017 at 0600 Yes Reported, Patient   Celecoxib (CELEBREX PO) Take 200 mg by mouth every other day  Past Week at Unknown time Yes Reported, Patient   FLUOXETINE HCL PO Take 20 mg by mouth 2 times daily  11/10/2017 at 0600 Yes Reported, Patient   lisinopril-hydrochlorothiazide (PRINZIDE/ZESTORETIC) 20-12.5 MG per tablet Take 1 tablet by mouth 2 times daily 11/10/2017 at 0600 Yes Reported, Patient   tamsulosin (FLOMAX) 0.4 MG capsule Take 0.4 mg by mouth daily  11/10/2017 at a.m Yes Reported, Patient   acetaminophen (TYLENOL) 325 MG  tablet Take 2 tablets by mouth every 6 hours as needed. Pain. Past Week at Unknown time Yes Samuel Eckert, DO       Nifedipine should be XL form

## 2017-11-10 NOTE — ANESTHESIA POSTPROCEDURE EVALUATION
Patient: Leodan Yanez    Procedure(s):  Incisional hernia Repair with mesh and component separation - Wound Class: I-Clean    Diagnosis:Incisional hernia   Diagnosis Additional Information: Procedure(s):  Incisional hernia Repair with mesh and component separation - Wound Class: I-Clean     Diagnosis: Incisional hernia    Anesthesia Type:  General, ETT, RSI    Note:  Anesthesia Post Evaluation    Patient location during evaluation: PACU  Patient participation: Able to fully participate in evaluation  Level of consciousness: awake  Pain management: adequate  Airway patency: patent  Cardiovascular status: acceptable  Respiratory status: acceptable  Hydration status: euvolemic  PONV: controlled     Anesthetic complications: None          Last vitals:  Vitals:    11/10/17 1311 11/10/17 1315 11/10/17 1330   BP:  125/85 (!) 126/93   Pulse:      Resp: 8 12 13   Temp:  97.5  F (36.4  C)    SpO2: 97% 99% 96%         Electronically Signed By: Mukul Thayer MD  November 10, 2017  1:39 PM

## 2017-11-10 NOTE — PROGRESS NOTES
SPIRITUAL HEALTH SERVICES Progress Note  [facility] Pre-surgical     responded to an [facility] request for a pre-surgical prayer. Patient was accompanied by his wife and daughter. Emotional support was offered and a prayer was welcomed.    Spiritual health services remain available.      Sydney Izquierdo  Chaplain Resident  [phone]

## 2017-11-10 NOTE — ANESTHESIA CARE TRANSFER NOTE
Patient: Leodan Yanez    Procedure(s):  Incisional hernia Repair with mesh and component separation - Wound Class: I-Clean    Diagnosis: Incisional hernia   Diagnosis Additional Information: No value filed.    Anesthesia Type:   General, ETT, RSI     Note:  Airway :Face Mask  Patient transferred to:PACU  Comments: Did wellHandoff Report: Identifed the Patient, Identified the Reponsible Provider, Reviewed the pertinent medical history, Discussed the surgical course, Reviewed Intra-OP anesthesia mangement and issues during anesthesia, Set expectations for post-procedure period and Allowed opportunity for questions and acknowledgement of understanding      Vitals: (Last set prior to Anesthesia Care Transfer)    CRNA VITALS  11/10/2017 1134 - 11/10/2017 1212      11/10/2017             Pulse: 78    SpO2: 94 %    Resp Rate (observed): 9                Electronically Signed By: SAMMI Cash CRNA  November 10, 2017  12:12 PM

## 2017-11-10 NOTE — ANESTHESIA PREPROCEDURE EVALUATION
"  Anesthesia Evaluation     .             ROS/MED HX    ENT/Pulmonary:     (+)sleep apnea, , . .    Neurologic:     (+)neuropathy - \"from the waist down\",    (-) Other neuro hx   Cardiovascular:     (+) Dyslipidemia, hypertension----. : . . . :. dysrhythmias a-fib, . Previous cardiac testing Echodate:2016 nl EFresults:date: results: date: results: date: results:         (-) CAD, CHF and pulmonary hypertension   METS/Exercise Tolerance:     Hematologic:        (-) anemia   Musculoskeletal:   (+) arthritis, , , other musculoskeletal- Incisional hernia      GI/Hepatic:     (+) GERD      (-) hiatal hernia and hepatitis   Renal/Genitourinary:     (+) chronic renal disease, Pt does not require dialysis, Pt has no history of transplant,       Endo:     (+) Obesity, .   (-) Type I DM, Type II DM, thyroid disease, chronic steroid usage and other endocrine disorder   Psychiatric:        (-) psychiatric history   Infectious Disease:  - neg infectious disease ROS       Malignancy:      - no malignancy   Other:    - neg other ROS                 Physical Exam      Airway   Mallampati: II  TM distance: >3 FB  Neck ROM: full    Dental     Cardiovascular   Rhythm and rate: regular and normal  (-) no murmur    Pulmonary    breath sounds clear to auscultation    Other findings: Hgb 15.5                Anesthesia Plan      History & Physical Review  History and physical reviewed and following examination; no interval change.    ASA Status:  3 .    NPO Status:  > 8 hours    Plan for General, ETT and RSI with Propofol induction. Maintenance will be Balanced.    PONV prophylaxis:  Ondansetron (or other 5HT-3) and Dexamethasone or Solumedrol  Additional equipment: Videolaryngoscope Glidescope backup      Postoperative Care  Postoperative pain management:  IV analgesics and Oral pain medications.      Consents  Anesthetic plan, risks, benefits and alternatives discussed with:  Patient.  Use of blood products discussed: Yes.   Consented to " blood products.  .                          .

## 2017-11-10 NOTE — IP AVS SNAPSHOT
[place] [id number] [facility]    [address]    [facility] [address]    Phone:  [phone]    Fax:  [phone]                                       After Visit Summary   11/10/2017    Leodan Yanez    MRN: [id number]           After Visit Summary Signature Page     I have received my discharge instructions, and my questions have been answered. I have discussed any challenges I see with this plan with the nurse or doctor.    ..........................................................................................................................................  Patient/Patient Representative Signature      ..........................................................................................................................................  Patient Representative Print Name and Relationship to Patient    ..................................................               ................................................  Date                                            Time    ..........................................................................................................................................  Reviewed by Signature/Title    ...................................................              ..............................................  Date                                                            Time

## 2017-11-10 NOTE — DISCHARGE INSTRUCTIONS
"HOME CARE FOLLOWING UMBILICAL/VENTRAL HERNIA REPAIR  SEEMA Padgett E. Gavin, SEEMA Higuera, JOSUÉ Lozada    DIET:  No restrictions.  Increased fluid intake is recommended. While taking pain medications, increase dietary fiber or add a fiber supplementation like Metamucil or Citrucel to help prevent constipation - a possible side effect of pain medications.    NAUSEA:  If nauseated from the anesthetic/pain meds; rest in bed, get up cautiously with assistance, and drink clear liquids (juice, tea, broth).    ACTIVITY:  Light Activity -- you may immediately be up and about as tolerated.  Driving -- you may drive when comfortable and off narcotic pain medications.  Light Work -- resume when comfortable off pain medications.  (If you can drive, you probably can work.)  Strenuous Work/Activity -- limit lifting to 20 pounds for 3 weeks.  Active Sports (running, biking, etc.) -- cautiously resume after 4 weeks.    INCISIONAL CARE:    If you have a dressing in place, keep clean and dry for 48 hours after surgery.  After this timeframe, you may replace the gauze daily if it becomes soiled.    You may remove the dressing and shower 48 hours after surgery.  Do not submerse incision in water for 1 week.    Return for HEATHER removal when output < 30cc/day    Sutures will absorb and need not be removed.    If present, leave the steri-strips (white paper tapes) in place for 14 days after surgery.    Wear binder for comfort    Expect a variable amount of swelling/bruising/discoloration that may appear around or below the repair site.    Some numbness around the incision is common.    A lump/\"healing ridge\" under the incision is normal and will gradually resolve over the following 1-2 months.    DISCOMFORT:  Local anesthetic placed at surgery should provide relief for 4-8 hours.  Begin taking pain pills before discomfort is severe.  Take the pain medication with some food, when possible, to minimize " side effects.  Intermittent use of ice packs to the hernia repair site may help during the first 1-3 weeks after surgery.  Expect gradual improvement.    Over-the-counter anti-inflammatory medications (i.e. Ibuprofen/Advil/Motrin or Naprosyn/Aleve) may be used per package instructions in addition to or while tapering off the narcotic pain medications to decrease swelling and sensitivity at the repair site.  DO NOT TAKE these Anti-inflammatory medications if your primary physician has advised against doing so, or if you have acid reflux, ulcer, or bleeding disorder, or take blood-thinner medications.  Call your primary physician or the surgery office if you have medication questions.      RETURN APPOINTMENT:  Schedule a follow-up visit 2-3 weeks post-op.  Office Phone:  [phone]     CONTACT US IF THE FOLLOWING DEVELOPS:   1. A fever that is above 101     2. If there is a large amount of drainage, bleeding, or swelling.   3. Severe pain that is not relieved by your prescription.   4. Drainage that is thick, cloudy, yellow, green or white.   5. Any other questions not answered by  Frequently Asked Questions  sheet.      FREQUENTLY ASKED QUESTIONS:    Q:  How should my incision look?    A:  Normally your incision will appear slightly swollen with light redness directly along the incision itself as it heals.  It may feel like a bump or ridge as the healing/scarring happens, and over time (3-4 months) this bump or ridge feeling should slowly go away.  In general, clear or pink watery drainage can be normal at first as your incision heals, but should decrease over time.    Q:  How do I know if my incision is infected?  A:  Look at your incision for signs of infection, like redness around the incision spreading to surrounding skin, or drainage of cloudy or foul-smelling drainage.  If you feel warm, check your temperature to see if you are running a fever.    **If any of these things occur, please notify the nurse at our  office.  We may need you to come into the office for an incision check.      Q:  How do I take care of my incision?  A:  If you have a dressing in place - Starting the day after surgery, replace the dressing 1-2 times a day until there is no further drainage from the incision.  At that time, a dressing is no longer needed.  Try to minimize tape on the skin if irritation is occurring at the tape sites.  If you have significant irritation from tape on the skin, please call the office to discuss other method of dressing your incision.    Small pieces of tape called  steri-strips  may be present directly overlying your incision; these may be removed 10 days after surgery unless otherwise specified by your surgeon.  If these tapes start to loosen at the ends, you may trim them back until they fall off or are removed.      Q:  There is a piece of tape or a sticky  lead  still on my skin.  Can I remove this?  A:  Sometimes the sticky  leads  used for monitoring during surgery or for evaluation in the emergency department are not all removed while you are in the hospital.  These sometimes have a tab or metal dot on them.  You can easily remove these on your own, like taking off a band-aid.  If there is a gel substance under the  lead , simply wipe/clean it off with a washcloth or paper towel.      Q:  What can I do to minimize constipation (very hard stools, or lack of stools)?  A:  Stay well hydrated.  Increase your dietary fiber intake or take a fiber supplement -with plenty of water.  Walk around frequently.  You may consider an over-the-counter stool-softener.  Your Pharmacist can assist you with choosing one that is stocked at your pharmacy.  Constipation is also one of the most common side effects of pain medication.  If you are using pain medication, be pro-active and try to PREVENT problems with constipation by taking the steps above BEFORE constipation becomes a problem.    Q:  What do I do if I need more pain  medications?  A:  Call the office to receive refills.  Be aware that certain pain meds cannot be called into a pharmacy and actually require a paper prescription.  A change may be made in your pain med as you progress thru your recovery period or if you have side effects to certain meds.    --Pain meds are NOT refilled after 5pm on weekdays, and NOT AT ALL on the weekends, so please look ahead to prevent problems.      Q:  Why am I having a hard time sleeping now that I am at home?  A:  Many medications you receive while you are in the hospital can impact your sleep for a number of days after your surgery/hospitalization.  Decreased level of activity and naps during the day may also make sleeping at night difficult.  Try to minimize day-time naps, and get up frequently during the day to walk around your home during your recovery time.  Sleep aides may be of some help, but are not recommended for long-term use.      Q:  I am having some back discomfort.  What should I do?  A:  This may be related to certain positioning that was required for your surgery, extended periods of time in bed, or other changes in your overall activity level.  You may try ice, heat, acetaminophen, or ibuprofen to treat this temporarily.  Note that many pain medications have acetaminophen in them and would state this on the prescription bottle.  Be sure not to exceed the maximum of 4000mg per day of acetaminophen.     **If the pain you are having does not resolve, is severe, or is a flare of back pain you have had on other occasions prior to surgery, please contact your primary physician for further recommendations or for an appointment to be examined at their office.    Q:  Why am I having headaches?  A:  Headaches can be caused by many things:  caffeine withdrawal, use of pain meds, dehydration, high blood pressure, lack of sleep, over-activity/exhaustion, flare-up of usual migraine headaches.  If you feel this is related to muscle tension  (a band-like feeling around the head, or a pressure at the low-back of the head) you may try ice or heat to this area.  You may need to drink more fluids (try electrolyte drink like Gatorade), rest, or take your usual migraine medications.   **If your headaches do not resolve, worsen, are accompanied by other symptoms, or if your blood pressure is high, please call your primary physician for recommendation and/or examination.    Q:  I am unable to urinate.  What do I do?  A:  A small percentage of people can have difficulty urinating initially after surgery.  This includes being able to urinate only a very small amount at a time and feeling discomfort or pressure in the very low abdomen.  This is called  urinary retention , and is actually an urgent situation.  Proceed to your nearest Emergency department for evaluation (not an Urgent Care Center).  Sometimes the bladder does not work correctly after certain medications you receive during surgery, or related to certain procedures.  You may need to have a catheter placed until your bladder recovers.  When planning to go to an Emergency department, it may help to call the ER to let them know you are coming in for this problem after a surgery.  This may help you get in quicker to be evaluated.  **If you have symptoms of a urinary tract infection, please contact your primary physician for the proper evaluation and treatment.    If you have other questions, please call the office Monday thru Friday between 8am and 5pm to discuss with the nurse or physician assistant.  #[phone]    There is a surgeon ON CALL on weekday evenings and over the weekend in case of urgent need only, and may be contacted at the same number.    If you are having an emergency, call 911 or proceed to your nearest emergency department.        GENERAL ANESTHESIA OR SEDATION ADULT DISCHARGE INSTRUCTIONS   SPECIAL PRECAUTIONS FOR 24 HOURS AFTER SURGERY    IT IS NOT UNUSUAL TO FEEL LIGHT-HEADED OR  FAINT, UP TO 24 HOURS AFTER SURGERY OR WHILE TAKING PAIN MEDICATION.  IF YOU HAVE THESE SYMPTOMS; SIT FOR A FEW MINUTES BEFORE STANDING AND HAVE SOMEONE ASSIST YOU WHEN YOU GET UP TO WALK OR USE THE BATHROOM.    YOU SHOULD REST AND RELAX FOR THE NEXT 24 HOURS AND YOU MUST MAKE ARRANGEMENTS TO HAVE SOMEONE STAY WITH YOU FOR AT LEAST 24 HOURS AFTER YOUR DISCHARGE.  AVOID HAZARDOUS AND STRENUOUS ACTIVITIES.  DO NOT MAKE IMPORTANT DECISIONS FOR 24 HOURS.    DO NOT DRIVE ANY VEHICLE OR OPERATE MECHANICAL EQUIPMENT FOR 24 HOURS FOLLOWING THE END OF YOUR SURGERY.  EVEN THOUGH YOU MAY FEEL NORMAL, YOUR REACTIONS MAY BE AFFECTED BY THE MEDICATION YOU HAVE RECEIVED.    DO NOT DRINK ALCOHOLIC BEVERAGES FOR 24 HOURS FOLLOWING YOUR SURGERY.    DRINK CLEAR LIQUIDS (APPLE JUICE, GINGER ALE, 7-UP, BROTH, ETC.).  PROGRESS TO YOUR REGULAR DIET AS YOU FEEL ABLE.    YOU MAY HAVE A DRY MOUTH, A SORE THROAT, MUSCLES ACHES OR TROUBLE SLEEPING.  THESE SHOULD GO AWAY AFTER 24 HOURS.    CALL YOUR DOCTOR FOR ANY OF THE FOLLOWING:  SIGNS OF INFECTION (FEVER, GROWING TENDERNESS AT THE SURGERY SITE, A LARGE AMOUNT OF DRAINAGE OR BLEEDING, SEVERE PAIN, FOUL-SMELLING DRAINAGE, REDNESS OR SWELLING.    IT HAS BEEN OVER 8 TO 10 HOURS SINCE SURGERY AND YOU ARE STILL NOT ABLE TO URINATE (PASS WATER).

## 2017-11-11 ENCOUNTER — APPOINTMENT (OUTPATIENT)
Dept: PHYSICAL THERAPY | Facility: CLINIC | Age: 67
DRG: 354 | End: 2017-11-11
Attending: SURGERY
Payer: COMMERCIAL

## 2017-11-11 LAB — GLUCOSE BLDC GLUCOMTR-MCNC: 131 MG/DL (ref 70–99)

## 2017-11-11 PROCEDURE — 97161 PT EVAL LOW COMPLEX 20 MIN: CPT | Mod: GP | Performed by: PHYSICAL THERAPIST

## 2017-11-11 PROCEDURE — 25000132 ZZH RX MED GY IP 250 OP 250 PS 637: Performed by: SURGERY

## 2017-11-11 PROCEDURE — 40000193 ZZH STATISTIC PT WARD VISIT: Performed by: PHYSICAL THERAPIST

## 2017-11-11 PROCEDURE — 25000128 H RX IP 250 OP 636: Performed by: PHYSICIAN ASSISTANT

## 2017-11-11 PROCEDURE — 25000132 ZZH RX MED GY IP 250 OP 250 PS 637: Performed by: PHYSICIAN ASSISTANT

## 2017-11-11 PROCEDURE — 00000146 ZZHCL STATISTIC GLUCOSE BY METER IP

## 2017-11-11 PROCEDURE — 97116 GAIT TRAINING THERAPY: CPT | Mod: GP | Performed by: PHYSICAL THERAPIST

## 2017-11-11 PROCEDURE — 12000007 ZZH R&B INTERMEDIATE

## 2017-11-11 PROCEDURE — 99232 SBSQ HOSP IP/OBS MODERATE 35: CPT | Performed by: INTERNAL MEDICINE

## 2017-11-11 PROCEDURE — 25000128 H RX IP 250 OP 636: Performed by: SURGERY

## 2017-11-11 PROCEDURE — 25000132 ZZH RX MED GY IP 250 OP 250 PS 637: Performed by: INTERNAL MEDICINE

## 2017-11-11 RX ORDER — ACETAMINOPHEN 500 MG
1000 TABLET ORAL EVERY 6 HOURS
Status: DISCONTINUED | OUTPATIENT
Start: 2017-11-11 | End: 2017-11-13 | Stop reason: HOSPADM

## 2017-11-11 RX ORDER — PANTOPRAZOLE SODIUM 40 MG/1
40 TABLET, DELAYED RELEASE ORAL EVERY MORNING
Status: DISCONTINUED | OUTPATIENT
Start: 2017-11-11 | End: 2017-11-13 | Stop reason: HOSPADM

## 2017-11-11 RX ORDER — AMOXICILLIN 250 MG
1 CAPSULE ORAL 2 TIMES DAILY
Status: DISCONTINUED | OUTPATIENT
Start: 2017-11-11 | End: 2017-11-13 | Stop reason: HOSPADM

## 2017-11-11 RX ORDER — KETOROLAC TROMETHAMINE 15 MG/ML
15 INJECTION, SOLUTION INTRAMUSCULAR; INTRAVENOUS ONCE
Status: COMPLETED | OUTPATIENT
Start: 2017-11-11 | End: 2017-11-11

## 2017-11-11 RX ORDER — ACETAMINOPHEN 10 MG/ML
1000 INJECTION, SOLUTION INTRAVENOUS EVERY 6 HOURS
Status: DISCONTINUED | OUTPATIENT
Start: 2017-11-11 | End: 2017-11-11

## 2017-11-11 RX ADMIN — SENNOSIDES AND DOCUSATE SODIUM 1 TABLET: 8.6; 5 TABLET ORAL at 21:59

## 2017-11-11 RX ADMIN — KETOROLAC TROMETHAMINE 15 MG: 15 INJECTION, SOLUTION INTRAMUSCULAR; INTRAVENOUS at 10:54

## 2017-11-11 RX ADMIN — Medication: at 15:19

## 2017-11-11 RX ADMIN — TAMSULOSIN HYDROCHLORIDE 0.4 MG: 0.4 CAPSULE ORAL at 09:43

## 2017-11-11 RX ADMIN — NIFEDIPINE 90 MG: 90 TABLET, FILM COATED, EXTENDED RELEASE ORAL at 09:44

## 2017-11-11 RX ADMIN — SODIUM CHLORIDE: 9 INJECTION, SOLUTION INTRAVENOUS at 15:25

## 2017-11-11 RX ADMIN — ACETAMINOPHEN 975 MG: 325 TABLET, FILM COATED ORAL at 09:43

## 2017-11-11 RX ADMIN — CEFAZOLIN SODIUM 1 G: 1 INJECTION, SOLUTION INTRAVENOUS at 04:14

## 2017-11-11 RX ADMIN — METOPROLOL SUCCINATE 200 MG: 100 TABLET, EXTENDED RELEASE ORAL at 09:44

## 2017-11-11 RX ADMIN — HEPARIN SODIUM 5000 UNITS: 5000 INJECTION, SOLUTION INTRAVENOUS; SUBCUTANEOUS at 06:45

## 2017-11-11 RX ADMIN — SODIUM CHLORIDE: 9 INJECTION, SOLUTION INTRAVENOUS at 06:44

## 2017-11-11 RX ADMIN — SENNOSIDES AND DOCUSATE SODIUM 1 TABLET: 8.6; 5 TABLET ORAL at 12:31

## 2017-11-11 RX ADMIN — PANTOPRAZOLE SODIUM 40 MG: 40 TABLET, DELAYED RELEASE ORAL at 10:54

## 2017-11-11 RX ADMIN — SODIUM CHLORIDE: 9 INJECTION, SOLUTION INTRAVENOUS at 22:11

## 2017-11-11 RX ADMIN — FLUOXETINE 20 MG: 20 CAPSULE ORAL at 21:59

## 2017-11-11 RX ADMIN — HEPARIN SODIUM 5000 UNITS: 5000 INJECTION, SOLUTION INTRAVENOUS; SUBCUTANEOUS at 21:59

## 2017-11-11 RX ADMIN — ACETAMINOPHEN 1000 MG: 500 TABLET, FILM COATED ORAL at 22:00

## 2017-11-11 RX ADMIN — FLUOXETINE 20 MG: 20 CAPSULE ORAL at 09:44

## 2017-11-11 RX ADMIN — ACETAMINOPHEN 1000 MG: 10 INJECTION, SOLUTION INTRAVENOUS at 15:45

## 2017-11-11 RX ADMIN — LISINOPRIL AND HYDROCHLOROTHIAZIDE 1 TABLET: 12.5; 2 TABLET ORAL at 21:59

## 2017-11-11 RX ADMIN — LISINOPRIL AND HYDROCHLOROTHIAZIDE 1 TABLET: 12.5; 2 TABLET ORAL at 09:43

## 2017-11-11 RX ADMIN — HEPARIN SODIUM 5000 UNITS: 5000 INJECTION, SOLUTION INTRAVENOUS; SUBCUTANEOUS at 15:19

## 2017-11-11 ASSESSMENT — ACTIVITIES OF DAILY LIVING (ADL)
ADLS_ACUITY_SCORE: 12
ADLS_ACUITY_SCORE: 11
ADLS_ACUITY_SCORE: 12

## 2017-11-11 ASSESSMENT — PAIN DESCRIPTION - DESCRIPTORS
DESCRIPTORS: DISCOMFORT
DESCRIPTORS: SHARP;SHOOTING

## 2017-11-11 NOTE — PROGRESS NOTES
"[facility]   General Surgery Progress Note           Assessment and Plan:   Assessment:   POD#1 s/p Procedure(s):  Incisional hernia Repair with mesh and component separation - Wound Class: I-Clean  Poor pain control  Afebrile      Plan:   -Pain control: Dilaudid PCA, will increase from 0.2 to 0.3, will add ofirmev and a one time dose of toradol.   -Diet: continue clear liquids, ok to ADAT slowly   -Activity: increase activity as tolerated, up out of bed walking today. Abdominal binder when up and active.   -OK to D/C rhodes when able  -VTE prophylaxis: heparin  -GI prophylaxis: Protonix         Interval History:   In bed, up to dangle recently and c/o sharp, severe pain. Reports PCA \"doesn't touch the pain\". Tolerating clear liquids, +flatus, denies nausea/vomiting, bloating or pain with PO intake. Not yet up out of bed. +rhodes.          Physical Exam:   Blood pressure 128/89, pulse 67, temperature 98.1  F (36.7  C), temperature source Oral, resp. rate 16, height 1.854 m (6' 1\"), weight (!) 141.1 kg (311 lb), SpO2 94 %.    I/O last 3 completed shifts:  In: 5937 [P.O.:500; I.V.:5437]  Out: 4555 [Urine:4225; Drains:280; Blood:50]    Abdomen:   soft, obese, tenderness noted diffusely and no masses palpatedhypoactive bowel sounds   Inc(s) - bandage clean and dry, abdominal binder in place    Garo x2 - serosanguinous, without leaking.           Data:     Recent Labs   Lab Test  08/09/12   0250  08/08/12   2130  08/08/12   1505  08/08/12   0517   08/06/12   1227   HGB  8.2*  8.4*  9.3*  8.2*   < >  7.4*   WBC  7.4   --    --   7.9   --   8.3    < > = values in this interval not displayed.       Lydia Guadalupe PA-C       The patient has been seen and examined by me.  I agree with the above assessment and plan.  Remove rhodes.  Zoey Red MD    "

## 2017-11-11 NOTE — PLAN OF CARE
Problem: Patient Care Overview  Goal: Plan of Care/Patient Progress Review  Outcome: Improving  VSS.  afebrile  Pain well controlled with Dilaudid PCA.  2.8mg used on shift.  LS clear.  CPAP worn overnight.  Faint BS.  Passing flatus.  Denies n/v.  Tolerating clear liquids.  Linder in place with adequate UOP  Incision:  Dressing with scant dried drainage.  Abdominal binder worn.  HEATHER drains:  R with 70cc output; left with 15cc output.  Disp:  TBD

## 2017-11-11 NOTE — PROGRESS NOTES
"[facility]  Hospitalist Consult Progress Note  rPince Alford MD 11/11/17    Reason for Stay (Diagnosis): ventral hernia repair         Assessment and Plan:      Summary of Stay: Leodan Yanez is a 67 year old male who was admitted on 11/10/2017. I was asked to see the patient for medical management following a ventral hernia repair.  The hospitalist service was consulted for assistance w/ medical management.    Problem List/Assessment and Plan:   1.  Hypertension w/ hx of Paroxysmal atrial fibrillation: stable.    --Resume home lisinopril-HCTZ w/ hold parameter as well as metoprolol.   --restart aspirin once OK w/ primary team to resume from a bleeding standpoint.    --PCP notes intention to discuss anticoagulation at follow-up     2.  Obstructive sleep apnea w/ obesity (BMI 41)  --continue CPAP at home settings     3.  Ventral hernia repair 11/10/2017  --management including DVT prophylaxis, diet, pain management, activity deferred to primary team.     4.  History of PUD: starting home PPI    5.  BPH: continue tamsulosin    6.  Mood disorder: continue PTA fluoxetine    7.  Hx of remote bariatric surgery: notes he does take vitamins at home but these are not listed on his PTA list.    Med rec: he notes missing numerous meds including protonix, vitamins for bariatric surgery.  Will ask PharmD to complete med rec.          Interval History (Subjective):      I assumed care  No cardiopulmonary complaints  Slow to mobilize  Mild headache, somewhat congested.  Otherwise all pain/other complaints are related to his abd/recent surgery                  Physical Exam:      Last Vital Signs:  /89 (BP Location: Right arm)  Pulse 67  Temp 98.1  F (36.7  C) (Oral)  Resp 16  Ht 1.854 m (6' 1\")  Wt (!) 141.1 kg (311 lb)  SpO2 94%  BMI 41.03 kg/m2      Intake/Output Summary (Last 24 hours) at 11/11/17 1446  Last data filed at 11/11/17 0943   Gross per 24 hour   Intake             2237 ml "   Output             4355 ml   Net            -2118 ml       General: Alert, awake, no acute distress.  HEENT: NC/AT, eyes anicteric, external occular movements intact, face symmetric.  Dentition WNL, MM moist.  Cardiac: RRR, S1, S2.  No murmurs appreciated.  Pulmonary: Normal chest rise, normal work of breathing.  Lungs CTA BL  Abdomen:obese, large binder in place, drain still present.  soft, non-distended.    Extremities: no deformities.  Warm, well perfused.  Skin: no rashes or lesions noted.  Warm and Dry.  Neuro: No focal deficits noted.  Speech clear.  Coordination and strength grossly normal.  Psych: Appropriate affect.         Medications:      All current medications were reviewed with changes reflected in problem list.         Data:      All new lab and imaging data was reviewed.   Labs:    Recent Labs  Lab 11/10/17  0725   CR 0.97   GFRESTIMATED 77   GFRESTBLACK >90       Recent Labs  Lab 11/10/17  0725         Imaging:   No results found for this or any previous visit (from the past 48 hour(s)).      Prince Alford MD.

## 2017-11-11 NOTE — PROGRESS NOTES
" 11/11/17 1605   Quick Adds   Type of Visit Initial PT Evaluation   Living Environment   Lives With spouse;child(max), adult  (22 y/o daughter with autism)   Living Arrangements mobile home   Home Accessibility stairs to enter home   Number of Stairs to Enter Home 5  (rail on both sides)   Transportation Available car;family or friend will provide   Self-Care   Usual Activity Tolerance moderate   Current Activity Tolerance moderate   Equipment Currently Used at Home walker, rolling;cane, straight   Functional Level Prior   Ambulation 1-->assistive equipment   Transferring 0-->independent   Toileting 0-->independent   Bathing 0-->independent   Dressing 0-->independent   Eating 0-->independent   Communication 0-->understands/communicates without difficulty   Swallowing 0-->swallows foods/liquids without difficulty   Cognition 0 - no cognition issues reported   Fall history within last six months yes   Number of times patient has fallen within last six months 1   Which of the above functional risks had a recent onset or change? ambulation;transferring   Prior Functional Level Comment Reports mod I with SEC for functional mobility, IND with ADLs   General Information   Onset of Illness/Injury or Date of Surgery - Date 11/10/17   Referring Physician Zoey Red MD   Patient/Family Goals Statement to have less pain   Pertinent History of Current Problem (include personal factors and/or comorbidities that impact the POC) Pt is POD #1 s/p ventral hernia repair   Precautions/Limitations abdominal precautions   General Info Comments Activity: Up with assist    Cognitive Status Examination   Orientation orientation to person, place and time   Level of Consciousness alert   Follows Commands and Answers Questions 100% of the time   Personal Safety and Judgment intact   Memory intact   Pain Assessment   Patient Currently in Pain (c/o 0/10 pain at rest and \"tolerable\" pain with mobility)   Posture    Posture Forward " "head position;Protracted shoulders   Range of Motion (ROM)   ROM Comment WFL in BLE   Strength   Strength Comments Appeared WFL in BLE as observed through functional mobility   Bed Mobility   Bed Mobility Comments CGA supine > sit with HOB slightly elevated   Transfer Skills   Transfer Comments CGA sit <> Stand with FWW   Gait   Gait Comments CGA with FWW x80', decreased gait speed/step length, flexed posture   Balance   Balance Comments good, no LOB/lateral path deviation noted with static/dynamic standing activities   Sensory Examination   Sensory Perception Comments c/o baseline neuropathy up to waist in BLE   General Therapy Interventions   Planned Therapy Interventions balance training;bed mobility training;gait training;ROM;strengthening;stretching;transfer training;risk factor education;home program guidelines;progressive activity/exercise   Clinical Impression   Criteria for Skilled Therapeutic Intervention yes, treatment indicated   PT Diagnosis decreased functional mobility   Influenced by the following impairments pain   Functional limitations due to impairments bed mobility, transfers, ambulation, stair climbing   Clinical Presentation Stable/Uncomplicated   Clinical Presentation Rationale stable clinical picture this date (pain, vitals)   Clinical Decision Making (Complexity) Low complexity   Therapy Frequency` daily   Predicted Duration of Therapy Intervention (days/wks) 4 days   Anticipated Equipment Needs at Discharge (has FWW and SEC)   Anticipated Discharge Disposition Home with Assist   Risk & Benefits of therapy have been explained Yes   Patient, Family & other staff in agreement with plan of care Yes   [facility] [email or web address] TM \"6 Clicks\"   2016, Trustees of [facility], under license to [employer].  All rights reserved.   6 Clicks Short Forms Basic Mobility Inpatient Short Form   [facility] [employer]  \"6 Clicks\" V.2 Basic Mobility Inpatient Short Form   1. Turning from your back " to your side while in a flat bed without using bedrails? 3 - A Little   2. Moving from lying on your back to sitting on the side of a flat bed without using bedrails? 3 - A Little   3. Moving to and from a bed to a chair (including a wheelchair)? 3 - A Little   4. Standing up from a chair using your arms (e.g., wheelchair, or bedside chair)? 3 - A Little   5. To walk in hospital room? 3 - A Little   6. Climbing 3-5 steps with a railing? 2 - A Lot   Basic Mobility Raw Score (Score out of 24.Lower scores equate to lower levels of function) 17   Total Evaluation Time   Total Evaluation Time (Minutes) 11

## 2017-11-11 NOTE — PLAN OF CARE
Problem: Patient Care Overview  Goal: Plan of Care/Patient Progress Review  Outcome: No Change  Pt admitted this evening for hernia repair. POD 0. VSS. RA. Linder in placed with good output. PCA dilaudid, abdominal binder in place. Abdominal dressing with scant drainage. x2 HEATHER drains. Up to dangle at bedside. Denies feeling nauseous, slightly dizzy. Clear liquid diet. Uses CPAP at night. Will continue to monitor.

## 2017-11-11 NOTE — PLAN OF CARE
Problem: Patient Care Overview  Goal: Plan of Care/Patient Progress Review  PT: Orders received, eval completed, treatment initiated.  Pt POD #1 s/p ventral hernia repair.  Reports he lives in mobile home with spouse and 24yo stepdaughter (who has autism); x5-6 steps to enter with rails on both sides and no steps within.  Reports use of SEC for functional mobility prior to surgery and IND with ADLs.     Discharge Planner PT   Patient plan for discharge: Home  Current status: CGA supine > sit via log roll with HOB slightly elevated; CGA sit <> Stand with FWW, amb x80' with FWW and CGA, decreased foot clearance noted on LLE (pt reports he needs TKA on this LE)  Barriers to return to prior living situation: stairs to enter  Recommendations for discharge: Home with assist  Rationale for recommendations: Anticipate with continued PT while inpatient, pt will demonstrate adequate mobility for safe discharge home once medically stable pending ability to perform stairs.       Entered by: Park Ross 11/11/2017 4:16 PM

## 2017-11-12 ENCOUNTER — APPOINTMENT (OUTPATIENT)
Dept: PHYSICAL THERAPY | Facility: CLINIC | Age: 67
DRG: 354 | End: 2017-11-12
Attending: SURGERY
Payer: COMMERCIAL

## 2017-11-12 PROCEDURE — 99207 ZZC CDG-MDM COMPONENT: MEETS LOW - DOWN CODED: CPT | Performed by: INTERNAL MEDICINE

## 2017-11-12 PROCEDURE — 25000132 ZZH RX MED GY IP 250 OP 250 PS 637: Performed by: SURGERY

## 2017-11-12 PROCEDURE — 25000132 ZZH RX MED GY IP 250 OP 250 PS 637: Performed by: INTERNAL MEDICINE

## 2017-11-12 PROCEDURE — 97530 THERAPEUTIC ACTIVITIES: CPT | Mod: GP | Performed by: PHYSICAL THERAPIST

## 2017-11-12 PROCEDURE — 25000132 ZZH RX MED GY IP 250 OP 250 PS 637: Performed by: PHYSICIAN ASSISTANT

## 2017-11-12 PROCEDURE — 97116 GAIT TRAINING THERAPY: CPT | Mod: GP | Performed by: PHYSICAL THERAPIST

## 2017-11-12 PROCEDURE — 99232 SBSQ HOSP IP/OBS MODERATE 35: CPT | Performed by: INTERNAL MEDICINE

## 2017-11-12 PROCEDURE — 40000193 ZZH STATISTIC PT WARD VISIT: Performed by: PHYSICAL THERAPIST

## 2017-11-12 PROCEDURE — 25000128 H RX IP 250 OP 636: Performed by: SURGERY

## 2017-11-12 PROCEDURE — 12000000 ZZH R&B MED SURG/OB

## 2017-11-12 RX ORDER — OXYCODONE HYDROCHLORIDE 5 MG/1
5-10 TABLET ORAL
Status: DISCONTINUED | OUTPATIENT
Start: 2017-11-12 | End: 2017-11-13 | Stop reason: HOSPADM

## 2017-11-12 RX ADMIN — SENNOSIDES AND DOCUSATE SODIUM 1 TABLET: 8.6; 5 TABLET ORAL at 09:24

## 2017-11-12 RX ADMIN — ACETAMINOPHEN 1000 MG: 500 TABLET, FILM COATED ORAL at 11:23

## 2017-11-12 RX ADMIN — ACETAMINOPHEN 1000 MG: 500 TABLET, FILM COATED ORAL at 17:40

## 2017-11-12 RX ADMIN — LISINOPRIL AND HYDROCHLOROTHIAZIDE 1 TABLET: 12.5; 2 TABLET ORAL at 09:23

## 2017-11-12 RX ADMIN — LISINOPRIL AND HYDROCHLOROTHIAZIDE 1 TABLET: 12.5; 2 TABLET ORAL at 20:24

## 2017-11-12 RX ADMIN — SODIUM CHLORIDE: 9 INJECTION, SOLUTION INTRAVENOUS at 12:55

## 2017-11-12 RX ADMIN — NIFEDIPINE 90 MG: 90 TABLET, FILM COATED, EXTENDED RELEASE ORAL at 09:25

## 2017-11-12 RX ADMIN — HEPARIN SODIUM 5000 UNITS: 5000 INJECTION, SOLUTION INTRAVENOUS; SUBCUTANEOUS at 15:03

## 2017-11-12 RX ADMIN — TAMSULOSIN HYDROCHLORIDE 0.4 MG: 0.4 CAPSULE ORAL at 09:24

## 2017-11-12 RX ADMIN — SENNOSIDES AND DOCUSATE SODIUM 1 TABLET: 8.6; 5 TABLET ORAL at 20:23

## 2017-11-12 RX ADMIN — SODIUM CHLORIDE: 9 INJECTION, SOLUTION INTRAVENOUS at 04:20

## 2017-11-12 RX ADMIN — OXYCODONE HYDROCHLORIDE 5 MG: 5 TABLET ORAL at 16:24

## 2017-11-12 RX ADMIN — HEPARIN SODIUM 5000 UNITS: 5000 INJECTION, SOLUTION INTRAVENOUS; SUBCUTANEOUS at 21:54

## 2017-11-12 RX ADMIN — ACETAMINOPHEN 1000 MG: 500 TABLET, FILM COATED ORAL at 04:18

## 2017-11-12 RX ADMIN — FLUOXETINE 20 MG: 20 CAPSULE ORAL at 20:23

## 2017-11-12 RX ADMIN — SODIUM CHLORIDE: 9 INJECTION, SOLUTION INTRAVENOUS at 20:28

## 2017-11-12 RX ADMIN — PANTOPRAZOLE SODIUM 40 MG: 40 TABLET, DELAYED RELEASE ORAL at 09:25

## 2017-11-12 RX ADMIN — METOPROLOL SUCCINATE 200 MG: 100 TABLET, EXTENDED RELEASE ORAL at 09:22

## 2017-11-12 RX ADMIN — HEPARIN SODIUM 5000 UNITS: 5000 INJECTION, SOLUTION INTRAVENOUS; SUBCUTANEOUS at 06:23

## 2017-11-12 RX ADMIN — FLUOXETINE 20 MG: 20 CAPSULE ORAL at 09:23

## 2017-11-12 ASSESSMENT — ACTIVITIES OF DAILY LIVING (ADL)
ADLS_ACUITY_SCORE: 10
ADLS_ACUITY_SCORE: 10
ADLS_ACUITY_SCORE: 11
ADLS_ACUITY_SCORE: 10

## 2017-11-12 NOTE — PROGRESS NOTES
"[facility]   General Surgery Progress Note           Assessment and Plan:   Assessment:   POD#2 s/p Procedure(s):  Incisional hernia Repair with mesh and component separation - Wound Class: I-Clean  Pain control improved  Afebrile      Plan:   -Pain control: OK transition to PO pain medication when PO intake adequate  -Diet: ADAT   -Activity: increase activity as tolerated, up out of bed walking today. Abdominal binder when up and active.   -VTE prophylaxis: heparin  -GI prophylaxis: Protonix  -Dispo: probable D/C tomorrow if tolerating diet advancement and pain controlled on PO medication         Interval History:   Comfortable in bed. Reports pain well controlled today on PCA. Tolerating full liquids. Will transition to PO pain control today, patient in agreement. +flatus, -BM. Up walking. Voiding independently.          Physical Exam:   Blood pressure 123/77, pulse 77, temperature 97  F (36.1  C), temperature source Oral, resp. rate 16, height 1.854 m (6' 1\"), weight (!) 141.1 kg (311 lb), SpO2 95 %.    I/O last 3 completed shifts:  In: 4015 [P.O.:1110; I.V.:2905]  Out: 2850 [Urine:2750; Drains:100]    Abdomen:   soft, obese, tenderness noted at incisions and no masses palpated +bowel sounds   Inc(s) - C/D/steris intact, abdominal binder in place    Garo x2 - serosanguinous, without leaking.           Data:     Recent Labs   Lab Test  08/09/12   0250  08/08/12   2130  08/08/12   1505  08/08/12   0517   08/06/12   1227   HGB  8.2*  8.4*  9.3*  8.2*   < >  7.4*   WBC  7.4   --    --   7.9   --   8.3    < > = values in this interval not displayed.       Lydia Guadalupe PA-C         The patient has been seen and examined by me.  I agree with the above assessment and plan.  Zoey Red MD    "

## 2017-11-12 NOTE — PROGRESS NOTES
"[facility]  Hospitalist Consult Progress Note  Prince Alford MD 11/12/2017    Reason for Stay (Diagnosis): ventral hernia repair         Assessment and Plan:      Summary of Stay: Leodan Yanez is a 67 year old male who was admitted on 11/10/2017. I was asked to see the patient for medical management following a ventral hernia repair.  The hospitalist service was consulted for assistance w/ medical management.    Problem List/Assessment and Plan:   1.  Hypertension w/ hx of Paroxysmal atrial fibrillation: stable.    --Resume home lisinopril-HCTZ w/ hold parameter as well as metoprolol.   --restart aspirin once OK w/ primary team to resume from a bleeding standpoint.    --PCP notes intention to discuss anticoagulation at follow-up     2.  Obstructive sleep apnea w/ obesity (BMI 41)  --continue CPAP at home settings     3.  Ventral hernia repair 11/10/2017  --management including DVT prophylaxis, diet, pain management, activity deferred to primary team.     4.  History of PUD: starting home PPI    5.  BPH: continue tamsulosin    6.  Mood disorder: continue PTA fluoxetine    7.  Hx of remote bariatric surgery: notes he does take vitamins at home but these are not listed on his PTA list.    Med rec: he notes missing numerous meds including protonix, vitamins for bariatric surgery.  Will ask PharmD to complete med rec.          Interval History (Subjective):      Feels much better today  Passing gas  Mobilizing w/ walker  No cardiopulmonary complaints  Still on PCA   Otherwise all pain/other complaints are related to his abd/recent surgery                  Physical Exam:      Last Vital Signs:  /76 (BP Location: Right arm)  Pulse 77  Temp 97  F (36.1  C) (Oral)  Resp 16  Ht 1.854 m (6' 1\")  Wt (!) 141.1 kg (311 lb)  SpO2 96%  BMI 41.03 kg/m2      Intake/Output Summary (Last 24 hours) at 11/11/17 1446  Last data filed at 11/11/17 0943   Gross per 24 hour   Intake             2237 " ml   Output             4355 ml   Net            -2118 ml       General: Alert, awake, no acute distress.  HEENT: NC/AT, eyes anicteric, external occular movements intact, face symmetric.  Dentition WNL, MM moist.  Cardiac: RRR, S1, S2.  No murmurs appreciated.  Pulmonary: Normal chest rise, normal work of breathing.  Lungs CTA BL  Abdomen:obese, large binder in place, drain still present.  soft, non-distended.    Extremities: no deformities.  Warm, well perfused.  Skin: no rashes or lesions noted.  Warm and Dry.  Neuro: No focal deficits noted.  Speech clear.  Coordination and strength grossly normal.  Psych: Appropriate affect.         Medications:      All current medications were reviewed with changes reflected in problem list.         Data:      All new lab and imaging data was reviewed.   Labs:    Recent Labs  Lab 11/10/17  0725   CR 0.97   GFRESTIMATED 77   GFRESTBLACK >90       Recent Labs  Lab 11/10/17  0725         Imaging:   No results found for this or any previous visit (from the past 48 hour(s)).      Prince Alford MD.

## 2017-11-12 NOTE — PLAN OF CARE
Problem: Patient Care Overview  Goal: Plan of Care/Patient Progress Review     Discharge Planner PT   Patient plan for discharge: Home  Current status: Mod I with supine > sit with cues for log roll and to exhale; sit <> stand with SBA, amb x350' with FWW and CGA progressed to mod I; performed x3 steps x2 reps with 2 handrails with SBA  Barriers to return to prior living situation: None anticipated  Recommendations for discharge: Home with assist  Rationale for recommendations: Anticipate with continued PT while inpatient, pt will demonstrate adequate mobility for safe discharge home once medically stable pending ability to perform stairs.       Entered by: Park Ross 11/12/2017 1:05 PM         Physical Therapy Discharge Summary    Reason for therapy discharge:    All goals and outcomes met, no further needs identified.    Progress towards therapy goal(s). See goals on Care Plan in [facility] electronic health record for goal details.  Goals met    Therapy recommendation(s):    No further therapy is recommended.

## 2017-11-12 NOTE — PLAN OF CARE
POD#2 Incisional hernia repair with mesh and component separation.   VSS, on RA; on home CPAP at HS (indepedent with application and removal). Afebrile.   Reports pain relief with Dilaudid PCA. MD ordered PO oxycodone and to d/c PCA.  LS clear and equal bilaterally.   BS hypoactive, passing flatus. LBM 11/10/17 Denies N/V.  Voiding per the urinal when in bed/chair.   Incision: vertical incision with steri strips. ABD pad worn over steri strips with abdominal binder holding it in place.  Up with SBA.  HEATHER drains: R with 30 mL output; L with 20 mL output.   Disposition: Possible d/c tomorrow if tolerating diet advancement and pain controlled by PO medication.

## 2017-11-12 NOTE — PLAN OF CARE
Problem: Surgery Nonspecified (Adult)  Goal: Signs and Symptoms of Listed Potential Problems Will be Absent, Minimized or Managed (Surgery Nonspecified)  Signs and symptoms of listed potential problems will be absent, minimized or managed by discharge/transition of care (reference Surgery Nonspecified (Adult) CPG).   Outcome: Improving  VSS on RA; on home Cpap at HS, independent with application and removal. Reports adequate pain control with dilaudid PCA at 0.3 mg, frequent use this shift. Abd in place, scheduled tylenol given for pain control. Dressing with scant amount of dried drainage, MARIAN underlying incision. HEATHER x2, small-moderate amount of drainage. BS+, passing gas and tolerating full liquid diet with good appetite. Ambulated in halls with A1 and walker, tolerated well. Voided x1 without urinal, voided again into urinal with good UO. Bed alarms in use, pt not attempting to exit bed. Alert and oriented, able to make needs known. Continue to monitor.

## 2017-11-12 NOTE — PLAN OF CARE
Problem: Patient Care Overview  Goal: Plan of Care/Patient Progress Review  Outcome: Improving  A&O x4. VSS. Up Ax1 with walker. Ambulated halls x2, needs to ambulate x2 before bedtime. Tolerating full liquid diet. Denies N/V. Pain improving, pt has Dilaudid PCA, dose increased from  0.2 to 0.3 this shift, continue to educate on proper use. Linder catheter out at 1745 DTV at 2345. Abdominal binder in place, dressing over abdominal incision, small amount of shadowing-marked, otherwise CDI. NS infusing. Bilateral HEATHER drains Rt output > Lt output. LS clear. Educated on frequency/use of incentive spirometer, continue to encourage use when awake. O2 sats mid 90s on RA. Continue to monitor.

## 2017-11-12 NOTE — PLAN OF CARE
Problem: Patient Care Overview  Goal: Plan of Care/Patient Progress Review  Outcome: Improving  Alert and oriented. Pleasant and cooperative. HEATHER X 2. Pain controlled with PCA. States pain 1-2 at rest but increased with movement.

## 2017-11-13 VITALS
DIASTOLIC BLOOD PRESSURE: 66 MMHG | HEART RATE: 77 BPM | SYSTOLIC BLOOD PRESSURE: 108 MMHG | HEIGHT: 73 IN | RESPIRATION RATE: 16 BRPM | OXYGEN SATURATION: 92 % | BODY MASS INDEX: 41.22 KG/M2 | TEMPERATURE: 96.5 F | WEIGHT: 311 LBS

## 2017-11-13 LAB — PLATELET # BLD AUTO: 147 10E9/L (ref 150–450)

## 2017-11-13 PROCEDURE — 25000132 ZZH RX MED GY IP 250 OP 250 PS 637: Performed by: INTERNAL MEDICINE

## 2017-11-13 PROCEDURE — 25000132 ZZH RX MED GY IP 250 OP 250 PS 637: Performed by: SURGERY

## 2017-11-13 PROCEDURE — 25000128 H RX IP 250 OP 636: Performed by: SURGERY

## 2017-11-13 PROCEDURE — 36415 COLL VENOUS BLD VENIPUNCTURE: CPT | Performed by: SURGERY

## 2017-11-13 PROCEDURE — 99232 SBSQ HOSP IP/OBS MODERATE 35: CPT | Performed by: INTERNAL MEDICINE

## 2017-11-13 PROCEDURE — 85049 AUTOMATED PLATELET COUNT: CPT | Performed by: SURGERY

## 2017-11-13 PROCEDURE — 25000132 ZZH RX MED GY IP 250 OP 250 PS 637: Performed by: PHYSICIAN ASSISTANT

## 2017-11-13 RX ORDER — AMOXICILLIN 250 MG
1 CAPSULE ORAL 2 TIMES DAILY
Qty: 100 TABLET | Refills: 0 | Status: SHIPPED | OUTPATIENT
Start: 2017-11-13 | End: 2017-12-18

## 2017-11-13 RX ORDER — OXYCODONE HYDROCHLORIDE 5 MG/1
5-10 TABLET ORAL EVERY 4 HOURS PRN
Qty: 40 TABLET | Refills: 0 | Status: SHIPPED | OUTPATIENT
Start: 2017-11-13 | End: 2017-12-18

## 2017-11-13 RX ADMIN — OXYCODONE HYDROCHLORIDE 5 MG: 5 TABLET ORAL at 06:10

## 2017-11-13 RX ADMIN — NIFEDIPINE 90 MG: 90 TABLET, FILM COATED, EXTENDED RELEASE ORAL at 08:38

## 2017-11-13 RX ADMIN — ACETAMINOPHEN 1000 MG: 500 TABLET, FILM COATED ORAL at 06:04

## 2017-11-13 RX ADMIN — ACETAMINOPHEN 1000 MG: 500 TABLET, FILM COATED ORAL at 00:45

## 2017-11-13 RX ADMIN — LISINOPRIL AND HYDROCHLOROTHIAZIDE 1 TABLET: 12.5; 2 TABLET ORAL at 08:39

## 2017-11-13 RX ADMIN — SENNOSIDES AND DOCUSATE SODIUM 1 TABLET: 8.6; 5 TABLET ORAL at 08:39

## 2017-11-13 RX ADMIN — METOPROLOL SUCCINATE 200 MG: 100 TABLET, EXTENDED RELEASE ORAL at 08:38

## 2017-11-13 RX ADMIN — OXYCODONE HYDROCHLORIDE 10 MG: 5 TABLET ORAL at 16:33

## 2017-11-13 RX ADMIN — FLUOXETINE 20 MG: 20 CAPSULE ORAL at 08:39

## 2017-11-13 RX ADMIN — TAMSULOSIN HYDROCHLORIDE 0.4 MG: 0.4 CAPSULE ORAL at 08:39

## 2017-11-13 RX ADMIN — HEPARIN SODIUM 5000 UNITS: 5000 INJECTION, SOLUTION INTRAVENOUS; SUBCUTANEOUS at 06:09

## 2017-11-13 RX ADMIN — PANTOPRAZOLE SODIUM 40 MG: 40 TABLET, DELAYED RELEASE ORAL at 08:39

## 2017-11-13 RX ADMIN — OXYCODONE HYDROCHLORIDE 10 MG: 5 TABLET ORAL at 11:42

## 2017-11-13 RX ADMIN — HEPARIN SODIUM 5000 UNITS: 5000 INJECTION, SOLUTION INTRAVENOUS; SUBCUTANEOUS at 14:58

## 2017-11-13 RX ADMIN — ACETAMINOPHEN 1000 MG: 500 TABLET, FILM COATED ORAL at 11:42

## 2017-11-13 ASSESSMENT — ACTIVITIES OF DAILY LIVING (ADL)
ADLS_ACUITY_SCORE: 9
ADLS_ACUITY_SCORE: 9
ADLS_ACUITY_SCORE: 10
ADLS_ACUITY_SCORE: 9
ADLS_ACUITY_SCORE: 9

## 2017-11-13 NOTE — PLAN OF CARE
Problem: Surgery Nonspecified (Adult)  Goal: Signs and Symptoms of Listed Potential Problems Will be Absent, Minimized or Managed (Surgery Nonspecified)  Signs and symptoms of listed potential problems will be absent, minimized or managed by discharge/transition of care (reference Surgery Nonspecified (Adult) CPG).   Outcome: Improving  VSS on home Cpap. Rested in bed this shift, able to turn/reposition self. Scheduled tylenol given for pain overnight, prn oxycodone this AM for activity. Voiding into urinal with good UO. PIV SL, tolerating PO well. BS+, passing gas, no BM this shift. Incisions with steri-strips, CDI. HEATHER x2, small amount of output overnight. Alert and oriented, able to make needs known. Continue to monitor.

## 2017-11-13 NOTE — PROGRESS NOTES
"[facility]  Hospitalist Consult Progress Note  Prince Alford MD 11/13/2017    Reason for Stay (Diagnosis): ventral hernia repair         Assessment and Plan:      Summary of Stay: Leodan Yanez is a 67 year old male who was admitted on 11/10/2017. I was asked to see the patient for medical management following a ventral hernia repair.  The hospitalist service was consulted for assistance w/ medical management.    Problem List/Assessment and Plan:   1.  Hypertension w/ hx of Paroxysmal atrial fibrillation: stable.    --Resumed home lisinopril-HCTZ w/ hold parameter as well as metoprolol.   --restart aspirin once OK w/ primary team to resume from a bleeding standpoint.    --PCP notes intention to discuss anticoagulation at follow-up     2.  Obstructive sleep apnea w/ obesity (BMI 41)  --continue CPAP at home settings     3.  Ventral hernia repair 11/10/2017  --management including DVT prophylaxis, diet, pain management, activity deferred to primary team.     4.  History of PUD: starting home PPI    5.  BPH: continue tamsulosin    6.  Mood disorder: continue PTA fluoxetine    7.  Hx of remote bariatric surgery: notes he does take vitamins at home but these are not listed on his PTA list.    Med rec: he notes missing numerous meds including protonix, vitamins for bariatric surgery.  Will ask PharmD to complete med rec.          Interval History (Subjective):      Hoping to go home  Passing gas  Mobilizing w/ walker  No cardiopulmonary complaints  Off PCA   Otherwise all pain/other complaints are related to his abd/recent surgery                  Physical Exam:      Last Vital Signs:  /83 (BP Location: Right arm)  Pulse 77  Temp 96.1  F (35.6  C) (Oral)  Resp 16  Ht 1.854 m (6' 1\")  Wt (!) 141.1 kg (311 lb)  SpO2 94%  BMI 41.03 kg/m2      Intake/Output Summary (Last 24 hours) at 11/11/17 1446  Last data filed at 11/11/17 0943   Gross per 24 hour   Intake             2237 ml "   Output             4355 ml   Net            -2118 ml       General: Alert, awake, no acute distress.  HEENT: NC/AT, eyes anicteric, external occular movements intact, face symmetric.  Dentition WNL, MM moist.  Cardiac: RRR, S1, S2.  No murmurs appreciated.  Pulmonary: Normal chest rise, normal work of breathing.  Lungs CTA BL  Abdomen:obese, large binder in place, drains still present.  soft, non-distended.    Extremities: no deformities.  Warm, well perfused.  Skin: no rashes or lesions noted.  Warm and Dry.  Neuro: No focal deficits noted.  Speech clear.  Coordination and strength grossly normal.  Psych: Appropriate affect.         Medications:      All current medications were reviewed with changes reflected in problem list.         Data:      All new lab and imaging data was reviewed.   Labs:    Recent Labs  Lab 11/10/17  0725   CR 0.97   GFRESTIMATED 77   GFRESTBLACK >90       Recent Labs  Lab 11/10/17  0725         Imaging:   No results found for this or any previous visit (from the past 48 hour(s)).      Prince Alford MD.

## 2017-11-13 NOTE — PLAN OF CARE
Problem: Patient Care Overview  Goal: Plan of Care/Patient Progress Review  Outcome: Improving  Reports pain well controlled with po oxycodone times one. No complaints of nausea. Up and ambulating halls with standby assist and walker. Bilat JPs and midline steri-strips intact. Tolerating regular diet. Ambulated halls with standby assist and walker. On Cpap for noc.

## 2017-11-13 NOTE — PROGRESS NOTES
"[facility]   General Surgery Progress Note         Assessment and Plan:   Assessment:   POD#3 s/p Procedure(s):  Incisional hernia Repair with mesh and component separation - Wound Class: I-Clean  Pain controlled  Afebrile      Plan:   -OK to remove 19f drain (right)  -VTE prophylaxis: heparin  -GI prophylaxis: Protonix  -Dispo:plan to DC today. Discussed DC instructions. Could use further drain teaching/measuring/stripping. DC instructions in chart. RTC 1-3 weeks. Drain to be removed in our clinic when less than 30cc per 24hr. Rx: oxycodone, senna         Interval History:   Comfortable in bed. Reports pain well controlled with oxycodone. Tolerating regular diet. Up walking. Voiding independently. +BM.         Physical Exam:   Blood pressure 108/66, pulse 77, temperature 96.5  F (35.8  C), temperature source Axillary, resp. rate 16, height 1.854 m (6' 1\"), weight (!) 141.1 kg (311 lb), SpO2 92 %.    I/O last 3 completed shifts:  In: 2909 [P.O.:860; I.V.:2049]  Out: 4030 [Urine:3930; Drains:100]    Abdomen:   soft, obese, tenderness noted at incisions and no masses palpated +bowel sounds   Inc(s) - C/D/steris intact, abdominal binder in place    Garo x2 - serosanguinous, without leaking.           Data:     Recent Labs   Lab Test  08/09/12   0250  08/08/12   2130  08/08/12   1505  08/08/12   0517   08/06/12   1227   HGB  8.2*  8.4*  9.3*  8.2*   < >  7.4*   WBC  7.4   --    --   7.9   --   8.3    < > = values in this interval not displayed.       PATRICK KhanC         "

## 2017-11-13 NOTE — PROGRESS NOTES
Discharged home with family via WC to family car. Med sched, Howie drain care, appt follow up, and instructions reviewed with patient on day shift and reviewed with spouse by this RN. Both express understanding.

## 2017-11-13 NOTE — PLAN OF CARE
Problem: Patient Care Overview  Goal: Plan of Care/Patient Progress Review  Outcome: Improving  Patient up SBA with walker to bathroom, alert and oriented. Denies nausea, scheduled tylenol and prn pain medication given. Tolerating diet, good appetite. Larger drain pulled today. Patient will be discharging later today, HEATHER education done.

## 2017-11-14 NOTE — OP NOTE
DATE OF PROCEDURE:  11/10/2017      PREOPERATIVE DIAGNOSES:     1.  Large incisional hernia, status post gastric bypass surgery.     2.  Morbid obesity with BMI 41.      POSTOPERATIVE DIAGNOSES:     1.  Large incisional hernia, status post gastric bypass surgery.     2.  Morbid obesity with BMI 41.      PROCEDURE:  Incisional hernia repair with bilateral myofascial advancement flaps and a preperitoneal mesh (500 square cm), increased difficulty due to morbid obesity.      SURGEON:  Cayetano Vivar MD      ASSISTANT:  LAYNE FAIR PA-C      BLOOD LOSS:  50 mL.      INDICATION:  Leodan Yanez is a 67-year-old male who underwent bariatric surgery for extreme obesity.  He has lost weight but has plateaued somewhat above 300 pounds.  He has also developed a significant hernia in his epigastrium at the site of his open bariatric surgery.  He requests repair.  This procedure, its risks, benefits, complications, convalescence, postop limitations, bleeding, infection, the use of mesh, the risk of mesh infection, the potential need to remove the mesh if it would become infected, as well as recurrence of the hernia were all discussed in detail with him and his family.  He has reviewed literature on this subject, all his questions have been answered, and he wishes to proceed with surgery.  We specifically discussed the benefits of continuing weight loss to the surgical procedure and potential for a smaller simpler procedure to repair the hernia if he would lose adequate weight, however, he feels he is unable to do this.      DESCRIPTION OF PROCEDURE:  The patient was brought to the operating room, placed supine on the table and after induction of anesthetic the central abdomen was shaved, prepped and draped in sterile manner.  A pause was performed, the site had been marked with him in preinduction.  The previous incision is excised.  Dissection is carried out down to the hernia itself.  The preperitoneal space was entered and  dissected.  The severely attenuated fascia was excised.  The medial edge of the rectus sheath was grasped with Kocher clamps and pulled to the midline but is under significant tension.  We further developed the preperitoneal space for placement of mesh.  There was 1 small peritoneal opening which was immediately closed with running 3-0 Vicryl suture.  Once the preperitoneal dissection was completed, it had extended from just above the umbilicus to just above the xiphoid, we began with further mobilization of the abdominal wall.  As noted above, the rectus muscles could not be immediately medialized.  We therefore elevated subcutaneous tissues off the anterior rectus sheath to the lateral edge of the rectus sheath, preserving perforating vessels as they were encountered.  This provided a bit more mobility, but still was inadequate for closure without undue tension.  The external oblique aponeurosis was divided first on the right and the muscle was indeed much more mobile after this but the closure was still under significant tension, so this procedure was repeated on the left as well. The aponeurosis was divided from the costal margin to the level of the umbilicus and the external oblique was dissected off the internal oblique. With bilateral myofascial advancement, the muscles could be approximated in the midline under mild tension.  The mesh was then soaked in IrriSept, an oval that measures 25 x 20 cm.  The entire mesh was used and placed into the preperitoneal space.  It was anchored with transfascial sutures laterally on each side with 3 transfascial stitches.  Each apex is also anchored with 3 transfascial sutures.  Midline fascia was then closed with heavy interrupted PDS sutures.  Prior to placement of the final sutures, 19 Garo drain is placed into the preperitoneal space over the mesh.  The subcutaneous space was then also drained with a #15 round Garo drain and the subcutaneous tissues were closed with  3-0 Vicryl.  Subcuticular 4-0 Vicryl was then placed followed by Steri-Strips, dressings and an abdominal binder.  Marcaine was instilled down the drains for postop pain relief.  He was then returned to the recovery room in excellent condition with all sponge and needle counts correct, having tolerated the procedure well.      Throughout the procedure, exposure and manipulation were much more difficult due to the patient's morbid obesity, which is very much centered around his abdominal cavity.        A physician's assistant was necessary for the procedure to assist in retraction, suctioning and exposure and manipulation of tissues.         VAMSHI RAY MD             D: 2017 10:27   T: 2017 10:46   MT: [id number]      Name:     SHANNON PEDERSEN   MRN:      [id number]        Account:        [id number]   :      1950           Procedure Date: 11/10/2017      Document: [id number]

## 2017-11-16 NOTE — DISCHARGE SUMMARY
[facility]    Discharge Summary  Surgery    Date of Admission:  11/10/2017  Date of Discharge:  11/13/2017  5:03 PM  Discharging Provider: Geraldo Donald PA-C  Discharge Summary Note completed by: Geraldo Donald PA-C on 11/16/2017  Date of Service: The patient was personally seen by Discharging Providers on the day of discharge.    Discharge Diagnoses   S/p incisional hernia repair with mesh and component separation    Procedure/Surgery Information   Procedure(s):  Incisional hernia Repair with mesh and component separation - Wound Class: I-Clean   Surgeon(s) and Role:     * Cayetano Vivar MD - Primary     * Jennifer Olvera PA-C - Assisting     Specimens: * No specimens in log *   Non-operative procedures: None performed       History of Present Illness   Leodan Yanez is a 67-year-old male who underwent bariatric surgery for extreme obesity.  He has lost weight but has plateaued somewhat above 300 pounds.  He has also developed a significant hernia in his epigastrium at the site of his open bariatric surgery.  He requests repair.  This procedure, its risks, benefits, complications, convalescence, postop limitations, bleeding, infection, the use of mesh, the risk of mesh infection, the potential need to remove the mesh if it would become infected, as well as recurrence of the hernia were all discussed in detail with him and his family.  He has reviewed literature on this subject, all his questions have been answered, and he wishes to proceed with surgery.  We specifically discussed the benefits of continuing weight loss to the surgical procedure and potential for a smaller simpler procedure to repair the hernia if he would lose adequate weight, however, he feels he is unable to do this.     Hospital Course   Leodan Yanez was admitted on 11/10/2017.  The following problems were addressed during his hospitalization:  Patient Active Problem List   Diagnosis     Incisional hernia        Post-operative antibiotic therapy included: N/A.  Post-operative pain control: was via IV until able to tolerate PO intake and transitioned to PO pain meds.    Remarkable hospital course events: The patient stayed in the hospital due to poor pain control. Hospitalist service was consulted to assist with management.  Please see Hospitalist notes for further details if needed. Leodan met all criteria for release on 11/13/2017  5:03 PM.  He was afebrile, tolerating diet, pain controlled on PO meds, ambulating well, and had return of bowel function.    Medications discontinued or adjusted during this hospitalization: see discharge med list below.    Antibiotics prescribed at discharge: None prescribed     Imaging study follow up needs:   -None performed    Discharge Instructions and Follow-Up:  Discharge diet: Regular   Discharge activity: Lifting restricted to 20 pounds   Discharge follow-up: Follow up with me in 1-3 weeks   Wound/Incision care: May get incision wet in shower but do not soak or scrub       Geraldo Donald PA-C      Discharge Disposition   Discharged to home   Condition at discharge: Stable    Pending Results   Final pathology results: No pathology submitted  Unresulted Labs Ordered in the Past 30 Days of this Admission     No orders found from 9/11/2017 to 11/11/2017.          Primary Care Physician   Stevie Krueger    Consultations This Hospital Stay   HOSPITALIST IP CONSULT  PHARMACY IP CONSULT  PHYSICAL THERAPY ADULT IP CONSULT    Discharge Orders   No discharge procedures on file.  Discharge Medications   Discharge Medication List as of 11/13/2017  4:38 PM      START taking these medications    Details   oxyCODONE IR (ROXICODONE) 5 MG tablet Take 1-2 tablets (5-10 mg) by mouth every 4 hours as needed for moderate to severe pain or severe pain, Disp-40 tablet, R-0, Local Print      senna-docusate (SENOKOT-S;PERICOLACE) 8.6-50 MG per tablet Take 1 tablet by mouth 2 times daily, Disp-100  tablet, R-0, Local Print         CONTINUE these medications which have NOT CHANGED    Details   aspirin 81 MG chewable tablet Take 81 mg by mouth daily, Historical      NIFEDIPINE PO Take 90 mg by mouth daily, Historical      metoprolol (TOPROL-XL) 200 MG 24 hr tablet Take 200 mg by mouth daily, Historical      Celecoxib (CELEBREX PO) Take 200 mg by mouth every other day , Historical      FLUOXETINE HCL PO Take 20 mg by mouth 2 times daily , Historical      lisinopril-hydrochlorothiazide (PRINZIDE/ZESTORETIC) 20-12.5 MG per tablet Take 1 tablet by mouth 2 times daily, Historical      tamsulosin (FLOMAX) 0.4 MG capsule Take 0.4 mg by mouth daily , Historical      acetaminophen (TYLENOL) 325 MG tablet Take 2 tablets by mouth every 6 hours as needed. Pain., Disp-250 tablet, OTC           Allergies   No Known Allergies  Data   Most Recent 3 CBC's:  Recent Labs   Lab Test  11/13/17   0750  11/10/17   0725  08/09/12   0250  08/08/12   2130  08/08/12   1505  08/08/12   0517 08/06/12   1227   WBC   --    --   7.4   --    --   7.9   --   8.3   HGB   --    --   8.2*  8.4*  9.3*  8.2*   < >  7.4*   MCV   --    --   90   --    --   91   --   91   PLT  147*  182  151   --    --   142*   --   199    < > = values in this interval not displayed.      Most Recent 3 BMP's:  Recent Labs   Lab Test  11/10/17   0725  05/19/15   1502  08/09/12   0250  08/08/12   0517  08/07/12   0600   NA   --    --   141  144  141   POTASSIUM   --    --   3.8  3.7  3.6   CHLORIDE   --    --   108  115*  112*   CO2   --    --   27  26  24   BUN   --    --   7  11  19   CR  0.97   --   0.66  0.62*  0.73   ANIONGAP   --    --   6  4*  5*   KELLY   --    --   7.6*  6.9*  7.0*   GLC   --   93  99  96  110*     Most Recent 2 LFT's:  Recent Labs   Lab Test  08/08/12 0517 08/06/12   1227   AST  21  15   ALT  16  13   ALKPHOS  44  58   BILITOTAL  0.8  0.4     Most Recent INR's and Anticoagulation Dosing History:  Anticoagulation Dose History     There is no  flowsheet data to display.        Most Recent 3 Troponin's:No lab results found.  Most Recent Cholesterol Panel:No lab results found.  Most Recent 6 Bacteria Isolates From Any Culture (See EPIC Reports for Culture Details):  Recent Labs   Lab Test  05/19/15   1502   CULT  Culture negative after 29 days  No growth     Most Recent TSH, T4 and A1c Labs:No lab results found.  Results for orders placed or performed during the hospital encounter of 08/07/17   CT Abdomen w Contrast    Narrative    CT ABDOMEN WITH CONTRAST   8/7/2017 3:33 PM     HISTORY: Incisional hernia.    TECHNIQUE: 100mL Isovue-370 IV were administered. After contrast  administration, volumetric helical sections were acquired from the  lung bases to the iliac crests. Coronal images were also  reconstructed. Radiation dose for this scan was reduced using  automated exposure control, adjustment of the mA and/or kV according  to patient size, or iterative reconstruction technique.    COMPARISON: None.     FINDINGS: Prior cholecystectomy. Nonobstructing 0.2 cm stone in the  lower pole of the left kidney. The liver, spleen, adrenal glands,  pancreas, and kidneys are otherwise unremarkable. No hydronephrosis.  Mild atherosclerotic aortoiliac calcification. Postoperative changes  of gastric bypass procedure are noted. There are at least 4 small  fat-containing ventral hernias in the supraumbilical region at  midline. The visualized loops of small bowel and colon are normal  caliber. No enlarged lymph nodes are identified in the abdomen.  Degenerative changes are noted throughout the visualized thoracolumbar  spine.      Impression    IMPRESSION:   1. At least 4 small fat-containing ventral hernias are noted in the  supraumbilical region at the midline.  2. Nonobstructing 0.2 cm stone in the lower pole of the left kidney.  3. Prior cholecystectomy.    BOB JURADO MD

## 2017-11-21 ENCOUNTER — TELEPHONE (OUTPATIENT)
Dept: OPHTHALMOLOGY | Facility: CLINIC | Age: 67
End: 2017-11-21

## 2017-11-22 ENCOUNTER — TELEPHONE (OUTPATIENT)
Dept: SURGERY | Facility: CLINIC | Age: 67
End: 2017-11-22

## 2017-11-22 ENCOUNTER — OFFICE VISIT (OUTPATIENT)
Dept: SURGERY | Facility: CLINIC | Age: 67
End: 2017-11-22
Payer: COMMERCIAL

## 2017-11-22 DIAGNOSIS — Z09 SURGICAL FOLLOWUP VISIT: Primary | ICD-10-CM

## 2017-11-22 PROCEDURE — 99207 ZZC NO CHARGE NURSE ONLY: CPT

## 2017-11-22 NOTE — MR AVS SNAPSHOT
After Visit Summary   11/22/2017    Leodan Yanez    MRN: [id number]           Patient Information     Date Of Birth          1950        Visit Information        Provider Department      11/22/2017 2:30 PM Nurse, [facility] Hernia      Today's Diagnoses     Surgical followup visit    -  1      Care Instructions    Change dressing over drain site once a day or more frequently if it becomes saturated to keep drain site clean and dry. RTC in one week.          Follow-ups after your visit        Follow-up notes from your care team     Return in about 1 week (around 11/29/2017).      Your next 10 appointments already scheduled     Dec 14, 2017  2:45 PM CST   Post-Op with John Lindsey MD   Eye Clinic ([facility])    [address] [place] [id number]   [phone]            Dec 21, 2017  2:45 PM CST   Post-Op with John Lindsey MD   Eye Clinic ([facility])    [address] [address]   [phone]            Jan 11, 2018  3:30 PM CST   Post-Op with John Lindsey MD   Eye Clinic ([facility])    [address] [place] [id number]   [phone]              Who to contact     If you have questions or need follow up information about today's clinic visit or your schedule please contact [facility] directly at [phone].  Normal or non-critical lab and imaging results will be communicated to you by [employer], letter or phone within 4 business days after the clinic has received the results. If you do not hear from us within 7 days, please contact the clinic through [employer] or phone. If you have a critical or abnormal lab result, we will notify you by phone as soon as possible.  Submit refill requests through [employer]in  "or call your pharmacy and they will forward the refill request to us. Please allow 3 business days for your refill to be completed.          Additional Information About Your Visit        MyChart Information     "[employer]weet Category lets you send messages to your doctor, view your test results, renew your prescriptions, schedule appointments and more. To sign up, go to [email or web address] . Click on \"Log in\" on the left side of the screen, which will take you to the Welcome page. Then click on \"Sign up Now\" on the right side of the page.     You will be asked to enter the access code listed below, as well as some personal information. Please follow the directions to create your username and password.     Your access code is: [email or web address]  Expires: 2018 10:31 AM     Your access code will  in 90 days. If you need help or a new code, please call your [facility] clinic or [phone].        Care EveryWhere ID     This is your Care EveryWhere ID. This could be used by other organizations to access your [facility] medical records  [id number]         Blood Pressure from Last 3 Encounters:   17 108/66   17 121/82   17 122/88    Weight from Last 3 Encounters:   11/10/17 (!) 311 lb (141.1 kg)   17 (!) 318 lb (144.2 kg)   17 (!) 317 lb (143.8 kg)              Today, you had the following     No orders found for display       Primary Care Provider Office Phone # Fax #    Stevie Krueger [phone]       [facility] [address] [facility] [address]        Equal Access to Services     BRITTNI [facility] CenterTHIERRY : Hadii lg Liu, loren greenberg, karen rivera. So [facility] [phone].    ATENCIÓN: Si habla español, tiene a martinez disposición servicios gratuitos de asistencia lingüística. Llame al [phone].    We comply with applicable federal civil rights laws and [place] laws. We do not discriminate " on the basis of race, color, national origin, age, disability, sex, sexual orientation, or gender identity.            Thank you!     Thank you for choosing [facility]  for your care. Our goal is always to provide you with excellent care. Hearing back from our patients is one way we can continue to improve our services. Please take a few minutes to complete the written survey that you may receive in the mail after your visit with us. Thank you!             Your Updated Medication List - Protect others around you: Learn how to safely use, store and throw away your medicines at www.disposemymeds.org.          This list is accurate as of: 11/22/17  3:33 PM.  Always use your most recent med list.                   Brand Name Dispense Instructions for use Diagnosis    acetaminophen 325 MG tablet    TYLENOL    250 tablet    Take 2 tablets by mouth every 6 hours as needed. Pain.    Upper GI bleed       aspirin 81 MG chewable tablet      Take 81 mg by mouth daily        CELEBREX PO      Take 200 mg by mouth every other day        FLUOXETINE HCL PO      Take 20 mg by mouth 2 times daily        lisinopril-hydrochlorothiazide 20-12.5 MG per tablet    PRINZIDE/ZESTORETIC     Take 1 tablet by mouth 2 times daily        metoprolol 200 MG 24 hr tablet    TOPROL-XL     Take 200 mg by mouth daily        NIFEDIPINE PO      Take 90 mg by mouth daily        oxyCODONE IR 5 MG tablet    ROXICODONE    40 tablet    Take 1-2 tablets (5-10 mg) by mouth every 4 hours as needed for moderate to severe pain or severe pain    Acute post-operative pain       senna-docusate 8.6-50 MG per tablet    SENOKOT-S;PERICOLACE    100 tablet    Take 1 tablet by mouth 2 times daily    Drug-induced constipation       tamsulosin 0.4 MG capsule    FLOMAX     Take 0.4 mg by mouth daily

## 2017-11-22 NOTE — NURSING NOTE
Subjective: Patient seen with Sonali Morse PA-C.  He is seen in clinic today as he reports having difficulty with fluid leaking at drain insertion site and maintaining bulb suction.    Patient reports 24 hour drain output for yesterday was approx.40cc.    Objective: Serous fluid leaking at drain insertion site. Bulb will not maintain suction.  PA confirms that channel drain has been pulled out and therefore will not maintain suction- gave ok to remove drain.  Drain removed, area cleansed with normal saline and gauze dressing with bacitracin applied.  Pt tolerated well.  Midline abdominal incision healing, steri-strips in place. Resolving area of bruising in LLQ.   Assessment:  Drain has been inadvertently pulled out at home  - channels are visible on exterior tubing.  Drain removed without difficulty.  Plan:  Patient was sent with tape and gauze and instructed to change dressing once a day or more frequently if becomes saturated to keep site clean and dry.  Advised that patient should make post-op appointment with  (or PA if surgeon is not available) to be seen in clinic next week for post-op check.      Geovanni Murray RN, LISA

## 2017-11-22 NOTE — PATIENT INSTRUCTIONS
Change dressing over drain site once a day or more frequently if it becomes saturated to keep drain site clean and dry. RTC in one week.

## 2017-11-22 NOTE — TELEPHONE ENCOUNTER
Name of caller: Patient    Reason for Call:  Pt has questions about his drain and would like it pulled     Surgeon:  Dr. Vivar    Recent Surgery:  Yes.    If yes, when & what type:  Incisional hernia repair with mesh on 11/10/17       Best phone number to reach pt at is: [phone]  Ok to leave a message with medical info? Yes.    Pharmacy preferred (if calling for a refill): na

## 2017-11-22 NOTE — TELEPHONE ENCOUNTER
S/p Incisional hernia repair with bilateral myofascial advancement flaps and a preperitoneal mesh (500 square cm), increased difficulty due to morbid obesity, 11/22/17.    Surgeon:  Dr. Vivar    Patient states that his 24 hour drain output yesterday was 44cc.  He reports that he is having some leakage around drain site with some tenderness and would like to have someone look at drain in clinic to be sure that everything looks ok.      Patient scheduled for nurse appt for drain check at 2:30 today

## 2017-12-04 ENCOUNTER — TELEPHONE (OUTPATIENT)
Dept: SURGERY | Facility: CLINIC | Age: 67
End: 2017-12-04

## 2017-12-04 ENCOUNTER — HOSPITAL ENCOUNTER (OUTPATIENT)
Dept: LAB | Facility: CLINIC | Age: 67
End: 2017-12-04
Attending: PHYSICIAN ASSISTANT
Payer: COMMERCIAL

## 2017-12-04 ENCOUNTER — OFFICE VISIT (OUTPATIENT)
Dept: SURGERY | Facility: CLINIC | Age: 67
End: 2017-12-04
Payer: COMMERCIAL

## 2017-12-04 VITALS
WEIGHT: 311 LBS | SYSTOLIC BLOOD PRESSURE: 110 MMHG | DIASTOLIC BLOOD PRESSURE: 68 MMHG | HEART RATE: 80 BPM | BODY MASS INDEX: 41.22 KG/M2 | RESPIRATION RATE: 14 BRPM | HEIGHT: 73 IN | OXYGEN SATURATION: 95 %

## 2017-12-04 DIAGNOSIS — Z09 SURGICAL FOLLOWUP VISIT: Primary | ICD-10-CM

## 2017-12-04 DIAGNOSIS — Z09 SURGICAL FOLLOWUP VISIT: ICD-10-CM

## 2017-12-04 DIAGNOSIS — R19.7 DIARRHEA OF PRESUMED INFECTIOUS ORIGIN: ICD-10-CM

## 2017-12-04 DIAGNOSIS — R10.13 ABDOMINAL PAIN, EPIGASTRIC: Primary | ICD-10-CM

## 2017-12-04 LAB
ERYTHROCYTE [DISTWIDTH] IN BLOOD BY AUTOMATED COUNT: 12.8 % (ref 10–15)
HCT VFR BLD AUTO: 42.4 % (ref 40–53)
HGB BLD-MCNC: 14.1 G/DL (ref 13.3–17.7)
MCH RBC QN AUTO: 30.3 PG (ref 26.5–33)
MCHC RBC AUTO-ENTMCNC: 33.3 G/DL (ref 31.5–36.5)
MCV RBC AUTO: 91 FL (ref 78–100)
PLATELET # BLD AUTO: 227 10E9/L (ref 150–450)
RBC # BLD AUTO: 4.66 10E12/L (ref 4.4–5.9)
WBC # BLD AUTO: 13.3 10E9/L (ref 4–11)

## 2017-12-04 PROCEDURE — 99024 POSTOP FOLLOW-UP VISIT: CPT | Performed by: PHYSICIAN ASSISTANT

## 2017-12-04 ASSESSMENT — PAIN SCALES - GENERAL: PAINLEVEL: SEVERE PAIN (6)

## 2017-12-04 NOTE — TELEPHONE ENCOUNTER
12/4/2017    Called and informed patient of mildly elevated WBC. Discussed with Dr. Vivar, recommended watching for now and waiting for the results of the C. Diff, and US with possible aspiration/culture. Patient is in agreement with this plan.     Lydia Guadalupe PA-C

## 2017-12-04 NOTE — PROGRESS NOTES
" 2017    Re:  Leodan Yanez   :  1950      Dear Dr. Krueger,    I had the pleasure of seeing Leodan today in follow-up after his incisional hernia repair with bilateral myofascial advancement flaps and a reperitoneal mesh (500square cm) on 2017 by Dr. Vivar. The patient tolerated the procedure well. He was seen in the clinic on 2017 at which time his drain was no longer working and had been removed. He reported that prior to drain removal he was putting out 40mL/24 hours with additional leaking around the drain site. He now reports that beginning this weekend, he had increased pain in his epigastric region, chills, diarrhea, and feels he may be \"accumulating fluid\". He denies a fever, nausea or vomiting and nobody else at home is sick.      On exam, the patient's incision is healing well without signs of infection. Steristrips are partially intact and a normal healing ridge is present at the incision site, as expected. The steristrips were trimmed at today's visit. His abdomen is soft and tender to palpation at the superior region of the incision up to his xiphoid. There is no palpable fluid.      I have ordered a CBC, stool study for clostridium difficile and an ultrasound with possible seroma drainage or drain placement. We will be happy to see him in the future as needed. He was encouraged to call us with any questions or concerns.      Sincerely,           Lydia Guadalupe PA-C      Please route or send letter to:  Primary Care Provider (PCP)      "

## 2017-12-04 NOTE — MR AVS SNAPSHOT
After Visit Summary   12/4/2017    Leodan Yanez    MRN: [id number]           Patient Information     Date Of Birth          1950        Visit Information        Provider Department      12/4/2017 1:30 PM Lydia Guadalupe PA-C [facility] Hernia      Today's Diagnoses     Surgical followup visit    -  1    Diarrhea of presumed infectious origin           Follow-ups after your visit        Your next 10 appointments already scheduled     Dec 14, 2017  2:45 PM CST   Post-Op with John Lindsey MD   Eye Clinic ([facility])    [address] [place] [id number]   [phone]            Dec 21, 2017  2:45 PM CST   Post-Op with John Lindsey MD   Eye Clinic ([facility])    [address] [address]   [phone]              Future tests that were ordered for you today     Open Future Orders        Priority Expected Expires Ordered    **CBC with platelets FUTURE anytime Routine 12/4/2017 12/4/2018 12/4/2017    Clostridium difficile Toxin B PCR Routine 12/4/2017 1/3/2018 12/4/2017    US Drainage Seroma/Hematoma Abscess/Cyst Routine 12/4/2017 12/4/2018 12/4/2017            Who to contact     If you have questions or need follow up information about today's clinic visit or your schedule please contact [facility] directly at [phone].  Normal or non-critical lab and imaging results will be communicated to you by MyChart, letter or phone within 4 business days after the clinic has received the results. If you do not hear from us within 7 days, please contact the clinic through MyChart or phone. If you have a critical or abnormal lab result, we will notify you by phone as soon as possible.  Submit refill requests through [employer] or call your pharmacy and they will forward the refill  "request to us. Please allow 3 business days for your refill to be completed.          Additional Information About Your Visit        [employer] Information     [employer] lets you send messages to your doctor, view your test results, renew your prescriptions, schedule appointments and more. To sign up, go to [email or web address] . Click on \"Log in\" on the left side of the screen, which will take you to the Welcome page. Then click on \"Sign up Now\" on the right side of the page.     You will be asked to enter the access code listed below, as well as some personal information. Please follow the directions to create your username and password.     Your access code is: [email or web address]  Expires: 2018 10:31 AM     Your access code will  in 90 days. If you need help or a new code, please call your [facility] clinic or [phone].        Care EveryWhere ID     This is your [facility] EveryWhere ID. This could be used by other organizations to access your [facility] medical records  [id number]        Your Vitals Were     Pulse Respirations Height Pulse Oximetry BMI (Body Mass Index)       80 14 1.854 m (6' 1\") 95% 41.03 kg/m2        Blood Pressure from Last 3 Encounters:   17 110/68   17 108/66   17 121/82    Weight from Last 3 Encounters:   17 (!) 141.1 kg (311 lb)   11/10/17 (!) 141.1 kg (311 lb)   17 (!) 144.2 kg (318 lb)               Primary Care Provider Office Phone # Fax #    Stevie Krueger [phone]       [facility] [address] [facility] [address]        Equal Access to Services     MUKUND AGARWAL : Padmini Liu, loren greenberg, serafin russell, karen okeefe. So [facility] [phone].    ATENCIÓN: Si habla español, tiene a martinez disposición servicios gratuitos de asistencia lingüística. Llame al [phone].    We comply with applicable federal civil rights laws and [place] laws. We do not " discriminate on the basis of race, color, national origin, age, disability, sex, sexual orientation, or gender identity.            Thank you!     Thank you for choosing [facility]  for your care. Our goal is always to provide you with excellent care. Hearing back from our patients is one way we can continue to improve our services. Please take a few minutes to complete the written survey that you may receive in the mail after your visit with us. Thank you!             Your Updated Medication List - Protect others around you: Learn how to safely use, store and throw away your medicines at www.disposemymeds.org.          This list is accurate as of: 12/4/17  2:09 PM.  Always use your most recent med list.                   Brand Name Dispense Instructions for use Diagnosis    acetaminophen 325 MG tablet    TYLENOL    250 tablet    Take 2 tablets by mouth every 6 hours as needed. Pain.    Upper GI bleed       aspirin 81 MG chewable tablet      Take 81 mg by mouth daily        CELEBREX PO      Take 200 mg by mouth every other day        FLUOXETINE HCL PO      Take 20 mg by mouth 2 times daily        lisinopril-hydrochlorothiazide 20-12.5 MG per tablet    PRINZIDE/ZESTORETIC     Take 1 tablet by mouth 2 times daily        metoprolol 200 MG 24 hr tablet    TOPROL-XL     Take 200 mg by mouth daily        NIFEDIPINE PO      Take 90 mg by mouth daily        oxyCODONE IR 5 MG tablet    ROXICODONE    40 tablet    Take 1-2 tablets (5-10 mg) by mouth every 4 hours as needed for moderate to severe pain or severe pain    Acute post-operative pain       senna-docusate 8.6-50 MG per tablet    SENOKOT-S;PERICOLACE    100 tablet    Take 1 tablet by mouth 2 times daily    Drug-induced constipation       tamsulosin 0.4 MG capsule    FLOMAX     Take 0.4 mg by mouth daily

## 2017-12-05 ENCOUNTER — HOSPITAL ENCOUNTER (OUTPATIENT)
Dept: ULTRASOUND IMAGING | Facility: CLINIC | Age: 67
Discharge: HOME | End: 2017-12-05
Attending: SURGERY | Admitting: SURGERY
Payer: COMMERCIAL

## 2017-12-05 ENCOUNTER — HOSPITAL ENCOUNTER (OUTPATIENT)
Dept: CT IMAGING | Facility: CLINIC | Age: 67
End: 2017-12-05
Attending: RADIOLOGY
Payer: COMMERCIAL

## 2017-12-05 DIAGNOSIS — K43.9 HERNIA OF ABDOMINAL WALL: ICD-10-CM

## 2017-12-05 DIAGNOSIS — R10.13 ABDOMINAL PAIN, EPIGASTRIC: ICD-10-CM

## 2017-12-05 LAB
GRAM STN SPEC: ABNORMAL
GRAM STN SPEC: NORMAL
SPECIMEN SOURCE: ABNORMAL
SPECIMEN SOURCE: ABNORMAL
SPECIMEN SOURCE: NORMAL

## 2017-12-05 PROCEDURE — 87070 CULTURE OTHR SPECIMN AEROBIC: CPT | Performed by: SURGERY

## 2017-12-05 PROCEDURE — 87186 SC STD MICRODIL/AGAR DIL: CPT | Performed by: SURGERY

## 2017-12-05 PROCEDURE — 87205 SMEAR GRAM STAIN: CPT | Performed by: SURGERY

## 2017-12-05 PROCEDURE — 25000125 ZZHC RX 250: Performed by: RADIOLOGY

## 2017-12-05 PROCEDURE — 76942 ECHO GUIDE FOR BIOPSY: CPT

## 2017-12-05 PROCEDURE — 87075 CULTR BACTERIA EXCEPT BLOOD: CPT | Mod: 91 | Performed by: SURGERY

## 2017-12-05 PROCEDURE — 87077 CULTURE AEROBIC IDENTIFY: CPT | Performed by: SURGERY

## 2017-12-05 PROCEDURE — 74176 CT ABD & PELVIS W/O CONTRAST: CPT

## 2017-12-05 RX ADMIN — LIDOCAINE HYDROCHLORIDE 10 ML: 10 INJECTION, SOLUTION EPIDURAL; INFILTRATION; INTRACAUDAL; PERINEURAL at 15:29

## 2017-12-05 NOTE — PROGRESS NOTES
Ultrasound incisional seroma drainage completed per Dr. Garcia on three separate sites. Midline deeper 24 cc serous fluid, midline superior 3 cc pus and midline inferior 1 cc serous fluid. Patient tolerated well and all discharge criteria were met. Dr. Garcia to call Dr. Mills with an update post procedure. Patient discharged ambulatory by self in stable condition.

## 2017-12-06 ENCOUNTER — TELEPHONE (OUTPATIENT)
Dept: SURGERY | Facility: CLINIC | Age: 67
End: 2017-12-06

## 2017-12-06 LAB
BACTERIA SPEC CULT: ABNORMAL
SPECIMEN SOURCE: ABNORMAL

## 2017-12-06 RX ORDER — CEPHALEXIN 500 MG/1
500 CAPSULE ORAL 3 TIMES DAILY
Qty: 30 CAPSULE | Refills: 0 | Status: SHIPPED | OUTPATIENT
Start: 2017-12-06 | End: 2018-09-24

## 2017-12-06 NOTE — PROGRESS NOTES
Patient was notified of these results. Discussed with ID, started on Keflex wile awaiting sensitivities.   Cayetano Vivar MD  12/6/2017 4:13 PM

## 2017-12-06 NOTE — TELEPHONE ENCOUNTER
Pt reports that Dr. Vivar called and spoke with patient re: culture results.  DJM placed order for Keflex - e-prescribed to [facility].

## 2017-12-06 NOTE — TELEPHONE ENCOUNTER
Name of caller: Leodan    Reason for Call:  Pt expecting a call back after his CT to let him know if  he needs antibiotics. Pt states he had a CT yesterday per [facility]. He was advised he would find the results of that if he needed to be put on antibiotics. He was advised by Dr Garcia to call today if he had not heard from [facility].    Surgeon:  Cb    Recent Surgery:  Yes.    If yes, when & what type:  11/13, :  Incisional hernia repair with bilateral myofascial advancement flaps and a preperitoneal mesh (500 square cm), increased difficulty due to morbid obesity.       Best phone number to reach pt at is: [phone]  Ok to leave a message with medical info? Yes.    Pharmacy preferred (if calling for a refill): [facility] in [place]

## 2017-12-06 NOTE — TELEPHONE ENCOUNTER
S/p Incisional hernia repair with bilateral myofascial advancement flaps and a preperitoneal mesh (500 square cm), increased difficulty due to morbid obesity, 11/13/17  Surgeon:  Dr. Vivar    Culture results are still pending.  Called patient to let him know that I will watch for results and forward to appropriate provider when results are available.      Pt states that pain has improved, but still has some discomfort.  He has not yet been able to give a stool sample.  He is aware that I will call him by the end of the day with results or an update on when he can expect results.

## 2017-12-07 ENCOUNTER — TELEPHONE (OUTPATIENT)
Dept: SURGERY | Facility: CLINIC | Age: 67
End: 2017-12-07

## 2017-12-07 ENCOUNTER — TELEPHONE (OUTPATIENT)
Dept: OPHTHALMOLOGY | Facility: CLINIC | Age: 67
End: 2017-12-07

## 2017-12-07 LAB
BACTERIA SPEC CULT: ABNORMAL
BACTERIA SPEC CULT: ABNORMAL
SPECIMEN SOURCE: ABNORMAL

## 2017-12-07 NOTE — TELEPHONE ENCOUNTER
Reviewed by Dr. Schumacher and Dr. Tejeda  Best to cancel non-urgent surgery  Pt aware    Will forward to surgery scheduler to cancel surgery dec12th  Would like to schedule a couple weeks from now when should be recovered from infection  Landon Galicia RN 12:43 PM 12/07/17

## 2017-12-07 NOTE — TELEPHONE ENCOUNTER
S/p hernia surgery with current infection and on antibiotics  Surgery scheduled 12-13-17 for intraocular lens explant (no implant, pt to remain aphakic)  Pt started yesterday keflex yesterday 12-6-17  Culture of infection pending per pt    No pre-op has been done    Reviewed non-urgent surgery  Pt states if cancelled Monday would like to still have done by end of year once hernia infection improves    Will review plan of care with cornea resident and call pt back  Landon Galicia RN 11:17 AM 12/07/17

## 2017-12-07 NOTE — TELEPHONE ENCOUNTER
----- Message from Brad Flores sent at 12/7/2017 10:03 AM CST -----  Regarding: Dr. Lindsey, Request call back in [facility] [place] to 12/13 eye surgery  Hi Team,    Patient is scheduled to have an eye surgery 12/13. He recently had hernia surgery and is taking antibiotics for an infection he got post-surgery. He wants to know if he can keep his eye surgery or if he should cancel/reschedule. Please follow-up.     Kind Regards    Brad     Please DO NOT send this message and/or reply back to sender. Call Center Representatives DO NOT respond to messages.

## 2017-12-07 NOTE — PROGRESS NOTES
Patient was notified of these results.  Reviewed with infectious disease, they are recommend continuing these antibiotics without changing.  Cayetano Vivar MD  12/7/2017 2:43 PM

## 2017-12-07 NOTE — TELEPHONE ENCOUNTER
GENERAL SURGERY NURSE PHONE TRIAGE   Leodan Yanez    MRN# [id number]  AGE:  67 year old  YOB: 1950  [phone] (home)  Surgeon: Dr. Vivra  Surgical Assist:  Jennifer Olvera PA-C     Surgery type: Incisional hernia repair with bilateral myofascial advancement flaps and a preperitoneal mesh (500 square cm), increased difficulty due to morbid obesity.      Surgery Date: November / 13 / 2017     POD: 24     CHIEF CONCERN:  Patient has an eye surgery scheduled at [facility] 12/11/17     HISTORY OF PRESENT ILLNESS:   Patient post op course included US and drainage of seroma on 12/5/17.  Keflex was ordered by Dr. Vivar yesterday 12/7/17.    Patient is calling to ask if he may have eye surgery on Monday 12/11/17.  Instructed patient to call eye surgeon to notify.  They would make the ultimate  Decision.  Dr. Vivar will be in clinic later today if they would like to speak with him.      PLAN:   Patient will notify eye surgeon of recent US and RX for Keflex  Ruthy Swenson RN

## 2017-12-08 LAB
BACTERIA SPEC CULT: ABNORMAL
SPECIMEN SOURCE: ABNORMAL

## 2017-12-12 ENCOUNTER — TELEPHONE (OUTPATIENT)
Dept: OPHTHALMOLOGY | Facility: CLINIC | Age: 67
End: 2017-12-12

## 2017-12-12 LAB
BACTERIA SPEC CULT: NORMAL
Lab: NORMAL
SPECIMEN SOURCE: NORMAL

## 2017-12-13 ENCOUNTER — TELEPHONE (OUTPATIENT)
Dept: SURGERY | Facility: CLINIC | Age: 67
End: 2017-12-13

## 2017-12-13 DIAGNOSIS — T81.49XA INCISIONAL INFECTION: Primary | ICD-10-CM

## 2017-12-13 RX ORDER — CLINDAMYCIN HCL 300 MG
300 CAPSULE ORAL 4 TIMES DAILY
Qty: 28 CAPSULE | Refills: 0 | Status: SHIPPED | OUTPATIENT
Start: 2017-12-13 | End: 2018-09-24

## 2017-12-13 NOTE — TELEPHONE ENCOUNTER
Name of caller: Patient    Reason for Call:  Refill    Surgeon:  Dr. Vivar    Recent Surgery:  Yes.    If yes, when & what type:  Hernia, 11/10/17      Best phone number to reach pt at is: [phone]  Ok to leave a message with medical info? Yes.    Pharmacy preferred (if calling for a refill): [facility] [address]

## 2017-12-13 NOTE — TELEPHONE ENCOUNTER
S/p Incisional hernia repair with bilateral myofascial advancement flaps and a preperitoneal mesh (500 square cm), increased difficulty due to morbid obesity, 11/13/17  Surgeon:  Dr. Vivar    Pt is wondering if he should have a refill on abx.  Currently taking Keflex - has 3 tabs left.  States that he has some ongoing redness at upper aspect of incision.    Reports that pain is much improved.  No drainage from incision or swelling noted.  Patient has post-op appointment with Dr. Vivar on 12/18/17.     Discussed with Sonali Morse PA-C.  Ordered Clindamycin 300mg QID x 7days for ongoing erythema at superior aspect of incision.

## 2017-12-18 ENCOUNTER — OFFICE VISIT (OUTPATIENT)
Dept: SURGERY | Facility: CLINIC | Age: 67
End: 2017-12-18
Payer: COMMERCIAL

## 2017-12-18 VITALS
BODY MASS INDEX: 41.22 KG/M2 | HEIGHT: 73 IN | SYSTOLIC BLOOD PRESSURE: 112 MMHG | WEIGHT: 311 LBS | RESPIRATION RATE: 16 BRPM | HEART RATE: 72 BPM | OXYGEN SATURATION: 97 % | DIASTOLIC BLOOD PRESSURE: 68 MMHG

## 2017-12-18 DIAGNOSIS — R19.00 ABDOMINAL SWELLING: Primary | ICD-10-CM

## 2017-12-18 DIAGNOSIS — Z09 SURGICAL FOLLOWUP VISIT: Primary | ICD-10-CM

## 2017-12-18 PROCEDURE — 99024 POSTOP FOLLOW-UP VISIT: CPT | Performed by: SURGERY

## 2017-12-18 RX ORDER — CEPHALEXIN 500 MG/1
500 CAPSULE ORAL 3 TIMES DAILY
Qty: 60 CAPSULE | Refills: 0 | Status: SHIPPED | OUTPATIENT
Start: 2017-12-18 | End: 2018-01-08

## 2017-12-18 NOTE — PROGRESS NOTES
He returns today in follow-up after his hernia repair.  He developed a seroma and aspiration of this on culture grew staph which was sensitive.  He has been started on antibiotics and notes that he now has no pain.  His redness has decreased dramatically.  He still does have some redness in the upper portion of his incision.    Exam: Abdomen is soft and nontender.  There is about a 7 cm area of fading induration and erythema centered at the junction of the upper and middle third of his incision with a smaller 3-4 cm area near the upper end of his incision.  These areas do yousuf but are minimally/non-tender.  There is no definite fluid collection identified at this time however, with his size, it is difficult to tell whether deeper fluid collection may be present.    Impression: Encouraging early response to antibiotics    Plan: We will continue his antibiotics, I did discuss this with infectious disease and they recommended 6 weeks of anti-staph treatment.  Since he reports he is having no problems with the antibiotics, we will continue on cephalosporin.  Additionally, since he has some persistence of redness, we will repeat an ultrasound and aspirate and reculture the fluid.  This will both give us an additional sample to evaluate and hopefully speed the healing process by eliminating the fluid collection again.    Cayetano Vivar MD  12/18/2017 11:39 AM

## 2017-12-18 NOTE — LETTER
2017    Re: Leodan Yanez - 1950    He returns today in follow-up after his hernia repair.  He developed a seroma and aspiration of this on culture grew staph which was sensitive.  He has been started on antibiotics and notes that he now has no pain.  His redness has decreased dramatically.  He still does have some redness in the upper portion of his incision.     Exam: Abdomen is soft and nontender.  There is about a 7 cm area of fading induration and erythema centered at the junction of the upper and middle third of his incision with a smaller 3-4 cm area near the upper end of his incision.  These areas do yousuf but are minimally/non-tender.  There is no definite fluid collection identified at this time however, with his size, it is difficult to tell whether deeper fluid collection may be present.     Impression: Encouraging early response to antibiotics     Plan: We will continue his antibiotics, I did discuss this with infectious disease and they recommended 6 weeks of anti-staph treatment.  Since he reports he is having no problems with the antibiotics, we will continue on cephalosporin.  Additionally, since he has some persistence of redness, we will repeat an ultrasound and aspirate and reculture the fluid.  This will both give us an additional sample to evaluate and hopefully speed the healing process by eliminating the fluid collection again.     Cayetano Vivar MD

## 2017-12-20 ENCOUNTER — TRANSFERRED RECORDS (OUTPATIENT)
Dept: HEALTH INFORMATION MANAGEMENT | Facility: CLINIC | Age: 67
End: 2017-12-20

## 2017-12-21 ENCOUNTER — OFFICE VISIT (OUTPATIENT)
Dept: OPHTHALMOLOGY | Facility: CLINIC | Age: 67
End: 2017-12-21
Attending: OPHTHALMOLOGY
Payer: COMMERCIAL

## 2017-12-21 DIAGNOSIS — H21.89: ICD-10-CM

## 2017-12-21 DIAGNOSIS — T85.22XS DISLOCATED INTRAOCULAR LENS, SEQUELA: ICD-10-CM

## 2017-12-21 DIAGNOSIS — H27.121 DISLOCATED IOL (INTRAOCULAR LENS), ANTERIOR, RIGHT: ICD-10-CM

## 2017-12-21 DIAGNOSIS — H27.01 APHAKIA, RIGHT: ICD-10-CM

## 2017-12-21 DIAGNOSIS — Z48.810 AFTERCARE FOLLOWING SURGERY OF A SENSORY ORGAN: Primary | ICD-10-CM

## 2017-12-21 DIAGNOSIS — T85.22XA DISLOCATION OF INTRAOCULAR LENS, INITIAL ENCOUNTER: ICD-10-CM

## 2017-12-21 PROCEDURE — 99213 OFFICE O/P EST LOW 20 MIN: CPT | Mod: ZF

## 2017-12-21 RX ORDER — OFLOXACIN 3 MG/ML
1 SOLUTION/ DROPS OPHTHALMIC 4 TIMES DAILY
COMMUNITY
Start: 2017-12-20

## 2017-12-21 RX ORDER — PREDNISOLONE ACETATE 10 MG/ML
1 SUSPENSION/ DROPS OPHTHALMIC 4 TIMES DAILY
COMMUNITY
Start: 2017-12-20

## 2017-12-21 ASSESSMENT — TONOMETRY
OS_IOP_MMHG: 18
OD_IOP_MMHG: 12
IOP_METHOD: ICARE

## 2017-12-21 ASSESSMENT — VISUAL ACUITY
METHOD: SNELLEN - LINEAR
OS_SC: 20/70
OD_SC: 20/25
OS_SC+: -1
OS_PH_SC: 20/40-2
OD_SC+: +2

## 2017-12-21 ASSESSMENT — SLIT LAMP EXAM - LIDS
COMMENTS: NORMAL
COMMENTS: NORMAL

## 2017-12-21 ASSESSMENT — CUP TO DISC RATIO: OS_RATIO: 0.3

## 2017-12-21 NOTE — NURSING NOTE
Chief Complaints and History of Present Illnesses   Patient presents with     Post Op (Ophthalmology) Left Eye     1 day POP left eye anterior vitrectomy and explant of dislocated IOL     HPI    Affected eye(s):  Left   Symptoms:     No decreased vision      Duration:  1 day   Frequency:  Constant       Do you have eye pain now?:  No      Comments:  Pt is 1 day s/p anterior vitrectomy/explant of dislocated IOL, left eye  Pt states he was able to sleep well last night  Patch/shield in place    Pt has POP drops in his possession    Tia PARKER 2:44 PM December 21, 2017

## 2017-12-21 NOTE — PROGRESS NOTES
CC: Postoperative day#1 s/p IOL explant/ anterior vitrectomy OS  POM #3 IOL explantation right eye    HPI: Leodan Yanez is a 67 year old male who presents for acute vision change. He is s/p IOL explant, anterior vitrectomy, iris fixated IOL OD.  He notes that today a sheet came over the right eye that was present when he woke up. Denies precipitating trauma.     POHx:   IOL explant, anterior vitrectomy, iris fixated IOL OD (8/14/17)   Anterior vitrectomy, intraocular lens repositioning with transcleral fixation right eye 5/12/17  IOL explantation, PPV, aphakic 9/13/17    Interval: slept well overnight, no pain. Reports fwe new floaters in left eye. Denies flashes.    Current Eye Medications:   Ketorolac 0.5% TID OD (not using)  Pilocarpine BID, OD (not using)    Review of Testing:  NA    Assessment & Plan     Leodan Yanez is a 67 year old male with the following diagnoses:     1. Postoperative day#1 s/p IOL explant/ anterior vitrectomy OD  - VA 20/70, PH 20/40  - IOP controlled  - new floaters OS, retina flat 360, s/s of RETINAL DETACHMENT discussed and f/up asap  - start pred forte and oflox FOUR TIMES A DAY OS  - precautions and shield discussed    2. S/p IOL explant OD POM#3  For recurrence of pigment dispersion  - VA 20/25, patient very happy  - rare pigmented cells in AC  - loose superior suture removed.   - start Ofloxacin QID OD for four days    RETURN TO CLINIC in 1 week, earlier RITESH Rouse  Cornea fellow    ~~~~~~~~~~~~~~~~~~~~~~~~~~~~~~~~~~~~~~~~~~~~~~~~~~~~~~~~~~~~~~~~    Complete documentation of historical and exam elements from today's encounter can be found in the full encounter summary report (not reduplicated in this progress note). I personally obtained the chief complaint(s) and history of present illness.  I confirmed and edited as necessary the review of systems, past medical/surgical history, family history, social history, and examination findings as documented by others.   I examined the patient myself, and I personally reviewed the relevant tests, images, and reports as documented above. I formulated and edited as necessary the assessment and plan and discussed the findings and management plan with the patient and family.     John Lindsey MD, MA  Director, Cornea & Anterior Segment  [facility] Department of Ophthalmology & Visual Neuroscience

## 2017-12-21 NOTE — MR AVS SNAPSHOT
After Visit Summary   12/21/2017    Leodan Yanez    MRN: [id number]           Patient Information     Date Of Birth          1950        Visit Information        Provider Department      12/21/2017 2:15 PM John Lindsey MD Eye Clinic        Today's Diagnoses     Aftercare following surgery of a sensory organ - Right Eye    -  1    Dislocated IOL (intraocular lens), anterior, right        Iridodonesis        Aphakia, right        Dislocation of intraocular lens, initial encounter        Dislocated intraocular lens, sequela - Right Eye           Follow-ups after your visit        Follow-up notes from your care team     Return in about 1 week (around 12/28/2017) for Follow up vision pressure OU.      Your next 10 appointments already scheduled     Dec 29, 2017 10:00 AM CST   Post-Op with Adeel Tejeda DO   Eye Clinic ([facility])    [address]  [address] [address]   [phone]            Jan 18, 2018  3:30 PM CST   Post-Op with John Lindsey MD   Eye Clinic ([facility])    [address]  [address] [address]   [phone]              Who to contact     Please call your clinic at [phone] to:    Ask questions about your health    Make or cancel appointments    Discuss your medicines    Learn about your test results    Speak to your doctor   If you have compliments or concerns about an experience at your clinic, or if you wish to file a complaint, please contact [facility] Patient Relations at [phone] or email us at [email or web address]         Additional Information About Your Visit        MyChart Information     Radial Network is an electronic gateway that provides easy, online access to your medical records. With Radial Network, you can request a clinic appointment, read your test results, renew a prescription or communicate  with your care team.     To sign up for Kinooshart visit the website at [email or web address]   You will be asked to enter the access code listed below, as well as some personal information. Please follow the directions to create your username and password.     Your access code is: [email or web address]  Expires: 2018 10:31 AM     Your access code will  in 90 days. If you need help or a new code, please contact your [facility] or call [phone] for assistance.        Care EveryWhere ID     This is your Care EveryWhere ID. This could be used by other organizations to access your [place] medical records  [id number]         Blood Pressure from Last 3 Encounters:   17 112/68   17 110/68   17 108/66    Weight from Last 3 Encounters:   17 (!) 141.1 kg (311 lb)   17 (!) 141.1 kg (311 lb)   11/10/17 (!) 141.1 kg (311 lb)              Today, you had the following     No orders found for display       Primary Care Provider Office Phone # Fax #    Stevie Krueger [phone]       [facility] [address] [facility] [address]        Equal Access to Services     MUKUND AGARWAL : Hadii aad ku hadasho Soomaali, waaxda luqadaha, qaybta kaalmada adeegyada, waxay idiin hayaan nicky escobar . So [facility] [phone].    ATENCIÓN: Si habla español, tiene a martinez disposición servicios gratuitos de asistencia lingüística. Llame al [phone].    We comply with applicable federal civil rights laws and [place] laws. We do not discriminate on the basis of race, color, national origin, age, disability, sex, sexual orientation, or gender identity.            Thank you!     Thank you for choosing EYE CLINIC  for your care. Our goal is always to provide you with excellent care. Hearing back from our patients is one way we can continue to improve our services. Please take a few minutes to complete the written survey that you may  receive in the mail after your visit with us. Thank you!             Your Updated Medication List - Protect others around you: Learn how to safely use, store and throw away your medicines at www.disposemymeds.org.          This list is accurate as of: 12/21/17 11:59 PM.  Always use your most recent med list.                   Brand Name Dispense Instructions for use Diagnosis    acetaminophen 325 MG tablet    TYLENOL    250 tablet    Take 2 tablets by mouth every 6 hours as needed. Pain.    Upper GI bleed       aspirin 81 MG chewable tablet      Take 81 mg by mouth daily        CELEBREX PO      Take 200 mg by mouth every other day        * cephALEXin 500 MG capsule    KEFLEX    30 capsule    Take 1 capsule (500 mg) by mouth 3 times daily    Incisional infection, initial encounter       * cephALEXin 500 MG capsule    KEFLEX    60 capsule    Take 1 capsule (500 mg) by mouth 3 times daily    Surgical followup visit       clindamycin 300 MG capsule    CLEOCIN    28 capsule    Take 1 capsule (300 mg) by mouth 4 times daily    Incisional infection       FLUOXETINE HCL PO      Take 20 mg by mouth 2 times daily        lisinopril-hydrochlorothiazide 20-12.5 MG per tablet    PRINZIDE/ZESTORETIC     Take 1 tablet by mouth 2 times daily        metoprolol 200 MG 24 hr tablet    TOPROL-XL     Take 200 mg by mouth daily        NIFEDIPINE PO      Take 90 mg by mouth daily        ofloxacin 0.3 % ophthalmic solution    OCUFLOX     Apply 1 drop to eye 4 times daily        prednisoLONE acetate 1 % ophthalmic susp    PRED FORTE     Apply 1 drop to eye 4 times daily        tamsulosin 0.4 MG capsule    FLOMAX     Take 0.4 mg by mouth daily        * Notice:  This list has 2 medication(s) that are the same as other medications prescribed for you. Read the directions carefully, and ask your doctor or other care provider to review them with you.

## 2017-12-22 ENCOUNTER — HOSPITAL ENCOUNTER (OUTPATIENT)
Dept: ULTRASOUND IMAGING | Facility: CLINIC | Age: 67
Discharge: HOME | End: 2017-12-22
Attending: SURGERY | Admitting: SURGERY
Payer: COMMERCIAL

## 2017-12-22 DIAGNOSIS — R19.00 ABDOMINAL SWELLING: ICD-10-CM

## 2017-12-22 LAB
GRAM STN SPEC: NORMAL
GRAM STN SPEC: NORMAL
Lab: NORMAL
SPECIMEN SOURCE: NORMAL

## 2017-12-22 PROCEDURE — 25000125 ZZHC RX 250: Performed by: RADIOLOGY

## 2017-12-22 PROCEDURE — 87070 CULTURE OTHR SPECIMN AEROBIC: CPT | Performed by: SURGERY

## 2017-12-22 PROCEDURE — 87205 SMEAR GRAM STAIN: CPT | Performed by: SURGERY

## 2017-12-22 PROCEDURE — 87075 CULTR BACTERIA EXCEPT BLOOD: CPT | Performed by: SURGERY

## 2017-12-22 PROCEDURE — 76942 ECHO GUIDE FOR BIOPSY: CPT

## 2017-12-22 RX ORDER — LIDOCAINE HYDROCHLORIDE 10 MG/ML
10 INJECTION, SOLUTION EPIDURAL; INFILTRATION; INTRACAUDAL; PERINEURAL ONCE
Status: COMPLETED | OUTPATIENT
Start: 2017-12-22 | End: 2017-12-22

## 2017-12-22 RX ADMIN — LIDOCAINE HYDROCHLORIDE 100 MG: 10 INJECTION, SOLUTION EPIDURAL; INFILTRATION; INTRACAUDAL; PERINEURAL at 13:20

## 2017-12-22 NOTE — PROGRESS NOTES
Pt was here to have a possible seroma drained around incision from hernia repair. Dr Daniels aspirated 10-15 cc cloudy reddish colored fluid which was sent to lab for cultures. Pt tolerated procedure well. F/U with Dr Mills for results.

## 2017-12-26 ENCOUNTER — TELEPHONE (OUTPATIENT)
Dept: SURGERY | Facility: CLINIC | Age: 67
End: 2017-12-26

## 2017-12-26 NOTE — TELEPHONE ENCOUNTER
Name of caller: Leodan    Reason for Call:  Pt looking for lab results from 12/22    Surgeon:  Dr. Vivar    Recent Surgery:  Yes.    If yes, when & what type:  11/13, Incisional hernia repair with bilateral myofascial advancement flaps and a preperitoneal mesh (500 square cm), increased difficulty due to morbid obesity.       Best phone number to reach pt at is: [phone]  Ok to leave a message with medical info? Yes.    Pharmacy preferred (if calling for a refill[facility] [place] [facility] [employer].

## 2017-12-27 ENCOUNTER — TELEPHONE (OUTPATIENT)
Dept: SURGERY | Facility: CLINIC | Age: 67
End: 2017-12-27

## 2017-12-27 LAB
BACTERIA SPEC CULT: NO GROWTH
Lab: NORMAL
SPECIMEN SOURCE: NORMAL

## 2017-12-27 NOTE — TELEPHONE ENCOUNTER
S/p Incisional hernia repair with bilateral myofascial advancement flaps and a preperitoneal mesh (500 square cm), increased difficulty due to morbid obesity, 11/13/17  Surgeon:  Dr. Vivar    Pt calling for culture results from US guided drainage of seroma on 12/22/17. Results given:  Culture is so far negative - no growth, however anaerobic culture is still preliminary.  Pt is aware that this is not final culture results.  He will continue on abx as ordered and will await call with final culture results from our office.  States he is feeling well, denies fever and pain.  No drainage from incision at this time.

## 2017-12-29 ENCOUNTER — OFFICE VISIT (OUTPATIENT)
Dept: OPHTHALMOLOGY | Facility: CLINIC | Age: 67
End: 2017-12-29
Attending: OPHTHALMOLOGY
Payer: COMMERCIAL

## 2017-12-29 DIAGNOSIS — Z48.810 AFTERCARE FOLLOWING SURGERY OF A SENSORY ORGAN: Primary | ICD-10-CM

## 2017-12-29 LAB
BACTERIA SPEC CULT: NORMAL
Lab: NORMAL
SPECIMEN SOURCE: NORMAL

## 2017-12-29 PROCEDURE — 99212 OFFICE O/P EST SF 10 MIN: CPT | Mod: ZF

## 2017-12-29 ASSESSMENT — VISUAL ACUITY
METHOD: SNELLEN - LINEAR
OS_PH_SC: 20/30-2
OD_SC: 20/25
OD_SC+: +2
OS_SC+: -3
OS_SC: 20/50

## 2017-12-29 ASSESSMENT — TONOMETRY
OD_IOP_MMHG: 13
IOP_METHOD: ICARE
OS_IOP_MMHG: 12

## 2017-12-29 ASSESSMENT — REFRACTION_WEARINGRX
OS_AXIS: 016
OD_ADD: +2.75
OD_CYLINDER: +0.50
OD_SPHERE: -2.25
OS_CYLINDER: +1.25
OD_AXIS: 073
OS_SPHERE: -1.25
OS_ADD: +2.75

## 2017-12-29 ASSESSMENT — SLIT LAMP EXAM - LIDS
COMMENTS: NORMAL
COMMENTS: NORMAL

## 2017-12-29 ASSESSMENT — CONF VISUAL FIELD
OS_NORMAL: 1
METHOD: COUNTING FINGERS
OD_NORMAL: 1

## 2017-12-29 NOTE — PROGRESS NOTES
CC: Postoperative week#1 s/p IOL explant/ anterior vitrectomy OS  POM #3 IOL explantation right eye    HPI: Leodan Yanez is a 67 year old male who presents for acute vision change. He is s/p IOL explant, anterior vitrectomy, iris fixated IOL OD.  He notes that today a sheet came over the right eye that was present when he woke up. Denies precipitating trauma.     POHx:   IOL explant, anterior vitrectomy, iris fixated IOL OD (8/14/17)   Anterior vitrectomy, intraocular lens repositioning with transcleral fixation right eye 5/12/17  IOL explantation, PPV, aphakic 9/13/17    Interval: Doing well, denies pain.  Still has larger floater that was present after surgery. No change.  No flashing or curtain.    Current Eye Medications:   Pred FOUR TIMES A DAY OD  Oflox FOUR TIMES A DAY OD    Review of Testing:  NA    Assessment & Plan     Leodan Yanez is a 67 year old male with the following diagnoses:     1. Postoperative week#1 s/p IOL explant/ anterior vitrectomy OD  - VA improving  - IOP controlled  - floaters OS unchanged from last week, no flashes or curtain  S/s of RETINAL DETACHMENT discussed and f/up asap  - Taper pred forte to three times a day  DC oflox   - precautions and shield discussed    2. S/p IOL explant OD POM#3  For recurrence of pigment dispersion  - VA 20/25, patient very happy  - rare pigmented cells in AC  - loose superior suture removed.   - start Ofloxacin QID OD for four days    RETURN TO CLINIC in 3 weeks for MRx, earlier as needed    Complete documentation of historical and exam elements from today's encounter can be found in the full encounter summary report (not reduplicated in this progress note). I personally obtained the chief complaint(s) and history of present illness.  I confirmed and edited as necessary the review of systems, past medical/surgical history, family history, social history, and examination findings as documented by others; and I examined the patient myself. I  personally reviewed the relevant tests, images, and reports as documented above. I formulated and edited as necessary the assessment and plan and discussed the findings and management plan with the patient and family.     Adeel Tejeda, DO

## 2017-12-29 NOTE — PROGRESS NOTES
Patient was notified of these results.  Left a message on his machine.  Cayetano Vivar MD  12/29/2017 12:11 PM

## 2017-12-29 NOTE — MR AVS SNAPSHOT
After Visit Summary   2017    Leodan Yanez    MRN: [id number]           Patient Information     Date Of Birth          1950        Visit Information        Provider Department      2017 10:00 AM Adeel Tejeda, DO Eye Clinic        Today's Diagnoses     Aftercare following surgery of a sensory organ - Right Eye    -  1       Follow-ups after your visit        Follow-up notes from your care team     Return in about 3 weeks (around 2018).      Your next 10 appointments already scheduled     2018  3:30 PM CST   Post-Op with John Lindsey MD   Eye Clinic ([facility])    [facility]  [address] [facility]  [address]  [facility] [address] [phone]              Who to contact     Please call your clinic at [phone] to:    Ask questions about your health    Make or cancel appointments    Discuss your medicines    Learn about your test results    Speak to your doctor   If you have compliments or concerns about an experience at your clinic, or if you wish to file a complaint, please contact [facility] Patient Relations at [phone] or email us at [email or web address]         Additional Information About Your Visit        MyChart Information     BagThathart is an electronic gateway that provides easy, online access to your medical records. With Hemosphere, you can request a clinic appointment, read your test results, renew a prescription or communicate with your care team.     To sign up for Riverfieldt visit the website at [email or web address]   You will be asked to enter the access code listed below, as well as some personal information. Please follow the directions to create your username and password.     Your access code is: [email or web address]  Expires: 2018 10:31 AM     Your access code will  in 90 days. If you need help or a new code, please contact your [facility]  Physicians Clinic or call [phone] for assistance.        Care EveryWhere ID     This is your Care EveryWhere ID. This could be used by other organizations to access your [place] medical records  [id number]         Blood Pressure from Last 3 Encounters:   12/18/17 112/68   12/04/17 110/68   11/13/17 108/66    Weight from Last 3 Encounters:   12/18/17 (!) 141.1 kg (311 lb)   12/04/17 (!) 141.1 kg (311 lb)   11/10/17 (!) 141.1 kg (311 lb)              Today, you had the following     No orders found for display       Primary Care Provider Office Phone # Fax #    Stevie Krueger [phone]       [facility] [address] [facility] [address]        Equal Access to Services     MUKUND AGARWAL : Hadii lg leach hadasho Socolleen, waaxda luqadaha, qaybta kaalmada yvonne, karen escobar . So [facility] [phone].    ATENCIÓN: Si habla español, tiene a martinez disposición servicios gratuitos de asistencia lingüística. Will al [phone].    We comply with applicable federal civil rights laws and [place] laws. We do not discriminate on the basis of race, color, national origin, age, disability, sex, sexual orientation, or gender identity.            Thank you!     Thank you for choosing EYE CLINIC  for your care. Our goal is always to provide you with excellent care. Hearing back from our patients is one way we can continue to improve our services. Please take a few minutes to complete the written survey that you may receive in the mail after your visit with us. Thank you!             Your Updated Medication List - Protect others around you: Learn how to safely use, store and throw away your medicines at www.disposemymeds.org.          This list is accurate as of: 12/29/17 10:26 AM.  Always use your most recent med list.                   Brand Name Dispense Instructions for use Diagnosis    acetaminophen 325 MG tablet    TYLENOL    250 tablet    Take 2  tablets by mouth every 6 hours as needed. Pain.    Upper GI bleed       aspirin 81 MG chewable tablet      Take 81 mg by mouth daily        CELEBREX PO      Take 200 mg by mouth every other day        * cephALEXin 500 MG capsule    KEFLEX    30 capsule    Take 1 capsule (500 mg) by mouth 3 times daily    Incisional infection, initial encounter       * cephALEXin 500 MG capsule    KEFLEX    60 capsule    Take 1 capsule (500 mg) by mouth 3 times daily    Surgical followup visit       clindamycin 300 MG capsule    CLEOCIN    28 capsule    Take 1 capsule (300 mg) by mouth 4 times daily    Incisional infection       FLUOXETINE HCL PO      Take 20 mg by mouth 2 times daily        lisinopril-hydrochlorothiazide 20-12.5 MG per tablet    PRINZIDE/ZESTORETIC     Take 1 tablet by mouth 2 times daily        metoprolol 200 MG 24 hr tablet    TOPROL-XL     Take 200 mg by mouth daily        NIFEDIPINE PO      Take 90 mg by mouth daily        ofloxacin 0.3 % ophthalmic solution    OCUFLOX     Apply 1 drop to eye 4 times daily        prednisoLONE acetate 1 % ophthalmic susp    PRED FORTE     Apply 1 drop to eye 4 times daily        tamsulosin 0.4 MG capsule    FLOMAX     Take 0.4 mg by mouth daily        * Notice:  This list has 2 medication(s) that are the same as other medications prescribed for you. Read the directions carefully, and ask your doctor or other care provider to review them with you.

## 2018-01-08 ENCOUNTER — TELEPHONE (OUTPATIENT)
Dept: SURGERY | Facility: CLINIC | Age: 68
End: 2018-01-08

## 2018-01-08 DIAGNOSIS — Z09 SURGICAL FOLLOWUP VISIT: ICD-10-CM

## 2018-01-08 RX ORDER — CEPHALEXIN 500 MG/1
500 CAPSULE ORAL 3 TIMES DAILY
Qty: 30 CAPSULE | Refills: 0 | Status: SHIPPED | OUTPATIENT
Start: 2018-01-08 | End: 2018-09-24

## 2018-01-08 NOTE — TELEPHONE ENCOUNTER
Name of caller: Patient    Reason for Call:  Needs another script for antibiotics.    Surgeon:  Dr. Vivar    Recent Surgery:  Yes.    If yes, when & what type: Incisional hernia repair with bilateral myofascial advancement flaps and a preperitoneal mesh (500 square cm), increased difficulty due to morbid obesity, 11/10,       Best phone number to reach pt at is: [phone]  Ok to leave a message with medical info? Yes.    Pharmacy preferred (if calling for a refill): [facility]

## 2018-01-08 NOTE — TELEPHONE ENCOUNTER
GENERAL SURGERY NURSE PHONE TRIAGE   Leodan Yanez    MRN# [id number]  AGE:  67 year old  YOB: 1950  [phone]   Surgeon: Dr. Vivar  Surgical Assist:  Jennifer Olvera PA-C     Surgery type: Incisional hernia repair with bilateral myofascial advancement flaps and a preperitoneal mesh (500 square cm), increased difficulty due to morbid obesity.      Surgery Date: 11/13/17     CHIEF CONCERN:  Request for antibiotic refill     HISTORY OF PRESENT ILLNESS:   History of US guided seroma drain on 12/05/17  Patient calling to request a refill of Keflex- reports he has been taking 4 times a day.  Was ordered for 3 times a day.  [facility] last tablet this morning[facility]    Afebrile, no complaints of pain.  Patient also would like instructions for follow up.    PLAN:   Routed to Dr. Vivar for Keflex refill  and recommendations for follow-up.  Patient instructed to take medication as prescribed.  Ruthy Swenson RN

## 2018-01-22 ENCOUNTER — TELEPHONE (OUTPATIENT)
Dept: OPHTHALMOLOGY | Facility: CLINIC | Age: 68
End: 2018-01-22

## 2018-01-22 NOTE — TELEPHONE ENCOUNTER
MARTIN and he will call back to reschedule for date and time of 1/25 @ 1:15 or 1/30 @1:45 for Post Op f/u 1 mo.  I gave him direct phone number to contact me or leave me a VM of his choice. Will reschedule when he calls back to determine best date and time.    Tawny Tineo COA 3:32 PM January 22, 2018

## 2018-01-25 ENCOUNTER — TELEPHONE (OUTPATIENT)
Dept: OPHTHALMOLOGY | Facility: CLINIC | Age: 68
End: 2018-01-25

## 2018-01-26 ENCOUNTER — TELEPHONE (OUTPATIENT)
Dept: OPHTHALMOLOGY | Facility: CLINIC | Age: 68
End: 2018-01-26

## 2018-01-31 ENCOUNTER — TELEPHONE (OUTPATIENT)
Dept: OPHTHALMOLOGY | Facility: CLINIC | Age: 68
End: 2018-01-31

## 2018-02-16 ENCOUNTER — OFFICE VISIT (OUTPATIENT)
Dept: OPHTHALMOLOGY | Facility: CLINIC | Age: 68
End: 2018-02-16
Attending: OPHTHALMOLOGY
Payer: COMMERCIAL

## 2018-02-16 DIAGNOSIS — T85.22XS DISLOCATED INTRAOCULAR LENS, SEQUELA: Primary | ICD-10-CM

## 2018-02-16 DIAGNOSIS — H52.11 MYOPIA, RIGHT: ICD-10-CM

## 2018-02-16 PROCEDURE — G0463 HOSPITAL OUTPT CLINIC VISIT: HCPCS | Mod: ZF

## 2018-02-16 PROCEDURE — 92015 DETERMINE REFRACTIVE STATE: CPT | Mod: ZF

## 2018-02-16 ASSESSMENT — SLIT LAMP EXAM - LIDS
COMMENTS: NORMAL
COMMENTS: NORMAL

## 2018-02-16 ASSESSMENT — VISUAL ACUITY
OS_SC+: -1
OD_SC: 20/20
METHOD_MR: OPP SIGNS
OD_SC+: -1
OS_SC: 20/60
METHOD: SNELLEN - LINEAR
OS_PH_SC: 20/20

## 2018-02-16 ASSESSMENT — TONOMETRY
IOP_METHOD: ICARE
OS_IOP_MMHG: 14
OD_IOP_MMHG: 15

## 2018-02-16 ASSESSMENT — REFRACTION_MANIFEST
OD_CYLINDER: +0.50
OS_SPHERE: -1.00
OS_ADD: +2.50
OS_CYLINDER: +0.00
OS_AXIS: 000
OD_AXIS: 120
OD_ADD: +2.50
OD_SPHERE: +0.00

## 2018-02-16 NOTE — MR AVS SNAPSHOT
After Visit Summary   2018    Leodan Yanez    MRN: [id number]           Patient Information     Date Of Birth          1950        Visit Information        Provider Department      2018 9:45 AM John Lindsey MD Eye Clinic        Today's Diagnoses     Dislocated intraocular lens, sequela - Both Eyes    -  1    Myopia, right - Both Eyes           Follow-ups after your visit        Follow-up notes from your care team     Return in about 1 year (around 2019).      Who to contact     Please call your clinic at [phone] to:    Ask questions about your health    Make or cancel appointments    Discuss your medicines    Learn about your test results    Speak to your doctor            Additional Information About Your Visit        MyChart Information     uShiphart is an electronic gateway that provides easy, online access to your medical records. With SCI Solutiont, you can request a clinic appointment, read your test results, renew a prescription or communicate with your care team.     To sign up for SCI Solutiont visit the website at [email or web address]   You will be asked to enter the access code listed below, as well as some personal information. Please follow the directions to create your username and password.     Your access code is: [email or web address]  Expires: 5/3/2018  6:31 AM     Your access code will  in 90 days. If you need help or a new code, please contact your [facility] or call [phone] for assistance.        Care EveryWhere ID     This is your Care EveryWhere ID. This could be used by other organizations to access your [place] medical records  [id number]         Blood Pressure from Last 3 Encounters:   17 112/68   17 110/68   17 108/66    Weight from Last 3 Encounters:   17 (!) 141.1 kg (311 lb)   17 (!) 141.1 kg (311 lb)   11/10/17 (!) 141.1 kg (311 lb)              Today, you had the following      No orders found for display       Primary Care Provider Office Phone # Fax #    Stevie Krueger [phone]       [facility] [address]  [facility] [address]        Equal Access to Services     MUKUDN AGARWAL : Hadjacinto lg ku montezo Soomaali, waaxda luqadaha, qaybta kaalmada aderafda, karen neri laMahadjaja okeefe. So [facility] [phone].    ATENCIÓN: Si habla español, tiene a martinez disposición servicios gratuitos de asistencia lingüística. Llame al [phone].    We comply with applicable federal civil rights laws and [place] laws. We do not discriminate on the basis of race, color, national origin, age, disability, sex, sexual orientation, or gender identity.            Thank you!     Thank you for choosing EYE CLINIC  for your care. Our goal is always to provide you with excellent care. Hearing back from our patients is one way we can continue to improve our services. Please take a few minutes to complete the written survey that you may receive in the mail after your visit with us. Thank you!             Your Updated Medication List - Protect others around you: Learn how to safely use, store and throw away your medicines at www.disposemymeds.org.          This list is accurate as of 2/16/18 11:03 AM.  Always use your most recent med list.                   Brand Name Dispense Instructions for use Diagnosis    acetaminophen 325 MG tablet    TYLENOL    250 tablet    Take 2 tablets by mouth every 6 hours as needed. Pain.    Upper GI bleed       aspirin 81 MG chewable tablet      Take 81 mg by mouth daily        CELEBREX PO      Take 200 mg by mouth every other day        * cephALEXin 500 MG capsule    KEFLEX    30 capsule    Take 1 capsule (500 mg) by mouth 3 times daily    Incisional infection, initial encounter       * cephALEXin 500 MG capsule    KEFLEX    30 capsule    Take 1 capsule (500 mg) by mouth 3 times daily    Surgical followup visit       clindamycin  300 MG capsule    CLEOCIN    28 capsule    Take 1 capsule (300 mg) by mouth 4 times daily    Incisional infection       FLUOXETINE HCL PO      Take 20 mg by mouth 2 times daily        lisinopril-hydrochlorothiazide 20-12.5 MG per tablet    PRINZIDE/ZESTORETIC     Take 1 tablet by mouth 2 times daily        metoprolol succinate 200 MG 24 hr tablet    TOPROL-XL     Take 200 mg by mouth daily        NIFEDIPINE PO      Take 90 mg by mouth daily        ofloxacin 0.3 % ophthalmic solution    OCUFLOX     Apply 1 drop to eye 4 times daily        prednisoLONE acetate 1 % ophthalmic susp    PRED FORTE     Apply 1 drop to eye 4 times daily        tamsulosin 0.4 MG capsule    FLOMAX     Take 0.4 mg by mouth daily        * Notice:  This list has 2 medication(s) that are the same as other medications prescribed for you. Read the directions carefully, and ask your doctor or other care provider to review them with you.

## 2018-02-16 NOTE — PROGRESS NOTES
CC:     Postoperative month #1 s/p IOL explant/ anterior vitrectomy left eye    POM #4 IOL explantation right eye      POHx:     IOL explant, anterior vitrectomy, iris fixated IOL OD (8/14/17)   Anterior vitrectomy, intraocular lens repositioning with transcleral fixation right eye 5/12/17  IOL explantation, PPV, aphakic 9/13/17    intraocular lens explant ant vitrectomy left eye December 2017    Interval: Doing well, denies pain.  Still has larger floater that was present after surgery. No change.  No flashing or curtain.    Current Eye Medications:   Pred FOUR TIMES A DAY OD  Oflox FOUR TIMES A DAY OD    Review of Testing:  NA    Assessment & Plan     Leodan aYnez is a 67 year old male with the following diagnoses:     Previous Retinal detachment  left eye   S/p intraocular lens explant both eyes due to zonular compromise  Left aphakic  Refracts to 20/20 both eyes     manifest refraction given    Return to clinic 3-6 mo [place]  Here 1 year earlier as needed     ~~~~~~~~~~~~~~~~~~~~~~~~~~~~~~~~~~~~~~~~~~~~~~~~~~~~~~~~~~~~~~~~    Complete documentation of historical and exam elements from today's encounter can be found in the full encounter summary report (not reduplicated in this progress note). I personally obtained the chief complaint(s) and history of present illness.  I confirmed and edited as necessary the review of systems, past medical/surgical history, family history, social history, and examination findings as documented by others.  I examined the patient myself, and I personally reviewed the relevant tests, images, and reports as documented above. I formulated and edited as necessary the assessment and plan and discussed the findings and management plan with the patient and family.     John Lindsey MD, MA  Director, Cornea & Anterior Segment  [facility] Department of Ophthalmology & Visual Neuroscience

## 2018-02-16 NOTE — NURSING NOTE
Chief Complaints and History of Present Illnesses   Patient presents with     Post Op (Ophthalmology) Right Eye     S/P Explant IOL with Ant Vit RE      HPI    Last Eye Exam:  12/29/17   Affected eye(s):  Both, Right   Symptoms:     Blurred vision   No floaters   No flashes      Duration:  7 weeks   Frequency:  Constant       Do you have eye pain now?:  No      Comments:  Pt complains of blurry vision, notice BE. Denies any pain or discomfort. Notes that he is no longer using any drops. Also, notes that's all floaters are gone. Denies any flashing lights. GIANCARLO TEE, COA 9:54 AM 02/16/2018

## 2018-09-24 ENCOUNTER — OFFICE VISIT (OUTPATIENT)
Dept: SURGERY | Facility: CLINIC | Age: 68
End: 2018-09-24
Payer: COMMERCIAL

## 2018-09-24 VITALS
DIASTOLIC BLOOD PRESSURE: 70 MMHG | HEIGHT: 73 IN | OXYGEN SATURATION: 98 % | RESPIRATION RATE: 16 BRPM | WEIGHT: 300 LBS | HEART RATE: 72 BPM | SYSTOLIC BLOOD PRESSURE: 100 MMHG | BODY MASS INDEX: 39.76 KG/M2

## 2018-09-24 DIAGNOSIS — R10.12 ABDOMINAL PAIN, LEFT UPPER QUADRANT: Primary | ICD-10-CM

## 2018-09-24 DIAGNOSIS — R10.9 LEFT SIDED ABDOMINAL PAIN: Primary | ICD-10-CM

## 2018-09-24 PROCEDURE — 99213 OFFICE O/P EST LOW 20 MIN: CPT | Performed by: SURGERY

## 2018-09-24 RX ORDER — AMLODIPINE AND BENAZEPRIL HYDROCHLORIDE 10; 20 MG/1; MG/1
1 CAPSULE ORAL DAILY
COMMUNITY

## 2018-09-24 NOTE — MR AVS SNAPSHOT
After Visit Summary   9/24/2018    Leodan Yanez    MRN: [id number]           Patient Information     Date Of Birth          1950        Visit Information        Provider Department      9/24/2018 9:00 AM Cayetano Vivar MD [facility]      Today's Diagnoses     Abdominal pain, left upper quadrant    -  1      Care Instructions    CT ABDOMEN AND PELVIS WITH CONTRAST AND VALSALVA    Date: 2/29/2018  Time: 10:00 AM   Location: [facility] [address] [place]  [address]      Please check in at 9:30 AM       Preparation for CT scanning      Do not eat or drink anything TWO hours prior to your exam :      Please bring an updated list of all your medications, including IV Chemo Therapy over the counter and herbal supplements.    For questions regarding your test please call [phone].   If you are taking any medications and you have questions, please call your primary care physician.             Follow-ups after your visit        Your next 10 appointments already scheduled     Sep 29, 2018 10:00 AM CDT   (Arrive by 9:45 AM)   CT ABDOMEN PELVIS W CONTRAST with RHCT2   [facility] ([facility])    [address] [facility] [address]   [phone]           How do I prepare for my exam? (Food and drink instructions) To prepare: Do not eat or drink for 2 hours before your exam. If you need to take medicine, you may take it with small sips of water. (We may ask you to take liquid medicine as well.)  How do I prepare for my exam? (Other instructions) Please arrive 30 minutes early for your CT.  Once in the department you might be asked to drink water 15-20 minutes prior to your exam.  If indicated you may be asked to drink an oral contrast in advance of your CT.  If this is the case, the imaging team will let you know or be in contact with you prior to your appointment   Patients over 70 or patients with diabetes or kidney problems: If you haven t had a blood test (creatinine test) within the last 30 days, the Cardiologist/Radiologist may require you to get this test prior to your exam.  If you have diabetes:  Continue to take your metformin medication on the day of your exam  What should I wear: Please wear loose clothing, such as a sweat suit or jogging clothes. Avoid snaps, zippers and other metal. We may ask you to undress and put on a hospital gown.  How long does the exam take: Most scans take less than 20 minutes.  What should I bring: Please bring any scans or X-rays taken at other hospitals, if similar tests were done. Also bring a list of your medicines, including vitamins, minerals and over-the-counter drugs. It is safest to leave personal items at home.  Do I need a : No  is needed.  What do I need to tell my doctor? Be sure to tell your doctor: * If you have any allergies. * If there s any chance you are pregnant. * If you are breastfeeding.  What should I do after the exam: No restrictions, You may resume normal activities.  What is this test: A CT (computed tomography) scan is a series of pictures that allows us to look inside your body. The scanner creates images of the body in cross sections, much like slices of bread. This helps us see any problems more clearly. You may receive contrast (X-ray dye) before or during your scan. You will be asked to drink the contrast.  Who should I call with questions: If you have any questions, please call the Imaging Department where you will have your exam. Directions, parking instructions, and other information is available on our website, [email or web address].              Future tests that were ordered for you today     Open Future Orders        Priority Expected Expires Ordered    CT Abdomen Pelvis w Contrast Routine 9/24/2018 9/24/2019 9/24/2018            Who to contact     If you have questions or need follow up  "information about today's clinic visit or your schedule please contact [facility] directly at [phone].  Normal or non-critical lab and imaging results will be communicated to you by [employer], letter or phone within 4 business days after the clinic has received the results. If you do not hear from us within 7 days, please contact the clinic through [employer] or phone. If you have a critical or abnormal lab result, we will notify you by phone as soon as possible.  Submit refill requests through [employer] or call your pharmacy and they will forward the refill request to us. Please allow 3 business days for your refill to be completed.          Additional Information About Your Visit        [employer] Information     [employer] lets you send messages to your doctor, view your test results, renew your prescriptions, schedule appointments and more. To sign up, go to [email or web address] . Click on \"Log in\" on the left side of the screen, which will take you to the Welcome page. Then click on \"Sign up Now\" on the right side of the page.     You will be asked to enter the access code listed below, as well as some personal information. Please follow the directions to create your username and password.     Your access code is: [email or web address]  Expires: 2018  9:12 AM     Your access code will  in 90 days. If you need help or a new code, please call your [facility] clinic or [phone].        Care EveryWhere ID     This is your Care EveryWhere ID. This could be used by other organizations to access your [facility] medical records  [id number]        Your Vitals Were     Pulse Respirations Height Pulse Oximetry BMI (Body Mass Index)       72 16 6' 1\" (1.854 m) 98% 39.58 kg/m2        Blood Pressure from Last 3 Encounters:   18 100/70   17 112/68   17 110/68    Weight from Last 3 Encounters:   18 300 lb (136.1 kg)   17 (!) 311 lb (141.1 kg)   17 (!) 311 lb (141.1 kg) "              Today, you had the following     No orders found for display         Today's Medication Changes          These changes are accurate as of 9/24/18  9:19 AM.  If you have any questions, ask your nurse or doctor.               Stop taking these medicines if you haven't already. Please contact your care team if you have questions.     cephALEXin 500 MG capsule   Commonly known as:  KEFLEX   Stopped by:  Cayetano Vivar MD           clindamycin 300 MG capsule   Commonly known as:  CLEOCIN   Stopped by:  Cayetano Vivar MD                    Primary Care Provider Office Phone # Fax #    Stevie Krueger [phone]       [facility] [id number] [address] [facility] [id number]        Equal Access to Services     [facility] : Hadii lg leach hadasho Soomaali, waaxda luqadaha, qaybta kaalmada adeegyada, karen escobar . So [facility] [phone].    ATENCIÓN: Si habla español, tiene a martinez disposición servicios gratuitos de asistencia lingüística. Llame al [phone].    We comply with applicable federal civil rights laws and [place] laws. We do not discriminate on the basis of race, color, national origin, age, disability, sex, sexual orientation, or gender identity.            Thank you!     Thank you for choosing [facility]  for your care. Our goal is always to provide you with excellent care. Hearing back from our patients is one way we can continue to improve our services. Please take a few minutes to complete the written survey that you may receive in the mail after your visit with us. Thank you!             Your Updated Medication List - Protect others around you: Learn how to safely use, store and throw away your medicines at www.disposemymeds.org.          This list is accurate as of 9/24/18  9:19 AM.  Always use your most recent med list.                   Brand Name Dispense Instructions for use Diagnosis    acetaminophen 325  MG tablet    TYLENOL    250 tablet    Take 2 tablets by mouth every 6 hours as needed. Pain.    Upper GI bleed       amLODIPine-benazepril 10-20 MG per capsule    LOTREL     Take 1 capsule by mouth daily        aspirin 81 MG chewable tablet      Take 81 mg by mouth daily        CELEBREX PO      Take 200 mg by mouth every other day        FLUOXETINE HCL PO      Take 20 mg by mouth 2 times daily        GABAPENTIN PO      Take by mouth daily        lisinopril-hydrochlorothiazide 20-12.5 MG per tablet    PRINZIDE/ZESTORETIC     Take 1 tablet by mouth 2 times daily        metoprolol succinate 200 MG 24 hr tablet    TOPROL-XL     Take 200 mg by mouth daily        NIFEDIPINE PO      Take 90 mg by mouth daily        ofloxacin 0.3 % ophthalmic solution    OCUFLOX     Apply 1 drop to eye 4 times daily        prednisoLONE acetate 1 % ophthalmic susp    PRED FORTE     Apply 1 drop to eye 4 times daily        tamsulosin 0.4 MG capsule    FLOMAX     Take 0.4 mg by mouth daily

## 2018-09-24 NOTE — PATIENT INSTRUCTIONS
CT ABDOMEN AND PELVIS WITH CONTRAST AND VALSALVA    Date: 2/29/2018  Time: 10:00 AM   Location: [facility] [address] [place]  [address]      Please check in at 9:30 AM       Preparation for CT scanning      Do not eat or drink anything TWO hours prior to your exam :      Please bring an updated list of all your medications, including IV Chemo Therapy over the counter and herbal supplements.    For questions regarding your test please call [phone].   If you are taking any medications and you have questions, please call your primary care physician.

## 2018-09-24 NOTE — LETTER
2018    RE: Leodan Yanez, : 1950      He returns in follow-up after hernia repair on 11/10/2017.  He had a large incisional hernia from a gastric bypass surgery.  This required bilateral myofascial advancement flaps and 500 cm  mesh in the retrorectus position.  Following surgery he developed a significant seroma and aspiration was performed.  Cultures grew light growth of MSSA.  He was treated appropriately and subsequent aspirations showed no growth.  Our last contact with him was 9 months ago.  He has not lost any weight.     Recently, he has noted pain in his left lateral abdomen.  This is along the costal margin.  This is far lateral to his rectus sheath.  He notes symptoms primarily when rolling over in bed.  His symptoms appear to be mild.     Exam: Abdomen: Incision is well-healed.  There is no palpable hernia defect.  With contraction of the rectus muscles, there is a slight diastases noted but this appears to be bridged by his previously placed mesh.  No abnormalities noted on physical exam of his left flank and left costal margin.     Impression unclear etiology for left lateral abdominal pain     Plan: We will obtain a CT of his abdomen to evaluate this pain and I will also obtain this scan with Valsalva to evaluate his hernia repair.         Cayetano Vivar MD

## 2018-09-24 NOTE — PROGRESS NOTES
He returns in follow-up after hernia repair on 11/10/2017.  He had a large incisional hernia from a gastric bypass surgery.  This required bilateral myofascial advancement flaps and 500 cm  mesh in the retrorectus position.  Following surgery he developed a significant seroma and aspiration was performed.  Cultures grew light growth of MSSA.  He was treated appropriately and subsequent aspirations showed no growth.  Our last contact with him was 9 months ago.  He has not lost any weight.    Recently, he has noted pain in his left lateral abdomen.  This is along the costal margin.  This is far lateral to his rectus sheath.  He notes symptoms primarily when rolling over in bed.  His symptoms appear to be mild.    Exam: Abdomen: Incision is well-healed.  There is no palpable hernia defect.  With contraction of the rectus muscles, there is a slight diastases noted but this appears to be bridged by his previously placed mesh.  No abnormalities noted on physical exam of his left flank and left costal margin.    Impression unclear etiology for left lateral abdominal pain    Plan: We will obtain a CT of his abdomen to evaluate this pain and I will also obtain this scan with Valsalva to evaluate his hernia repair.    Cayetano Vivar MD  9/24/2018 9:09 AM    Please route or send letter to:  Primary Care Provider (PCP) and Include Progress Note

## 2018-09-29 ENCOUNTER — HOSPITAL ENCOUNTER (OUTPATIENT)
Dept: CT IMAGING | Facility: CLINIC | Age: 68
Discharge: HOME | End: 2018-09-29
Attending: SURGERY | Admitting: SURGERY
Payer: COMMERCIAL

## 2018-09-29 DIAGNOSIS — R10.9 LEFT SIDED ABDOMINAL PAIN: ICD-10-CM

## 2018-09-29 PROCEDURE — 25000128 H RX IP 250 OP 636: Performed by: RADIOLOGY

## 2018-09-29 PROCEDURE — 74177 CT ABD & PELVIS W/CONTRAST: CPT

## 2018-09-29 RX ORDER — IOPAMIDOL 755 MG/ML
500 INJECTION, SOLUTION INTRAVASCULAR ONCE
Status: COMPLETED | OUTPATIENT
Start: 2018-09-29 | End: 2018-09-29

## 2018-09-29 RX ADMIN — IOPAMIDOL 100 ML: 755 INJECTION, SOLUTION INTRAVENOUS at 09:43

## 2018-09-29 RX ADMIN — SODIUM CHLORIDE 65 ML: 9 INJECTION, SOLUTION INTRAVENOUS at 09:43

## 2018-10-02 NOTE — PROGRESS NOTES
Patient was notified of these results. Recommended he discuss GI evaluation with his primary (with prior Mirella-en-Y gastric bypass). He is currently on PPI by his report.  Cayetano Vivar MD  10/2/2018 1:34 PM

## 2019-09-17 ENCOUNTER — TELEPHONE (OUTPATIENT)
Dept: OPHTHALMOLOGY | Facility: CLINIC | Age: 69
End: 2019-09-17

## 2019-09-17 NOTE — TELEPHONE ENCOUNTER
Spoke to pt at 0920  Reviewed Dr. Lindsey in [place], [place] now  Pt will review insurance company cornea clinics near pt.  Reviewed Dr. Ivey able see pt if chooses to return to [facility] location    Landon Galicia RN RN 9:24 AM 09/18/19          [facility] Call Center    Phone Message    May a detailed message be left on voicemail: yes    Reason for Call: Other: Pt is former pt of Dr. John Lindsey and pt wants to follow him, he would a call back with this information      Action Taken: Message routed to:  [facility] ([facility] Eye

## 2020-06-19 ENCOUNTER — COMMUNICATION - HEALTHEAST (OUTPATIENT)
Dept: SCHEDULING | Facility: CLINIC | Age: 70
End: 2020-06-19

## 2020-07-23 ENCOUNTER — HOSPITAL ENCOUNTER (EMERGENCY)
Facility: CLINIC | Age: 70
Discharge: HOME | End: 2020-07-23
Attending: EMERGENCY MEDICINE | Admitting: EMERGENCY MEDICINE
Payer: COMMERCIAL

## 2020-07-23 ENCOUNTER — APPOINTMENT (OUTPATIENT)
Dept: CT IMAGING | Facility: CLINIC | Age: 70
End: 2020-07-23
Attending: EMERGENCY MEDICINE
Payer: COMMERCIAL

## 2020-07-23 VITALS
HEIGHT: 74 IN | WEIGHT: 314.82 LBS | TEMPERATURE: 97.6 F | BODY MASS INDEX: 40.4 KG/M2 | RESPIRATION RATE: 18 BRPM | HEART RATE: 60 BPM | SYSTOLIC BLOOD PRESSURE: 140 MMHG | DIASTOLIC BLOOD PRESSURE: 103 MMHG | OXYGEN SATURATION: 98 %

## 2020-07-23 DIAGNOSIS — N20.1 URETEROLITHIASIS: ICD-10-CM

## 2020-07-23 DIAGNOSIS — N20.0 BILATERAL KIDNEY STONES: ICD-10-CM

## 2020-07-23 LAB
ALBUMIN SERPL-MCNC: 3.7 G/DL (ref 3.4–5)
ALBUMIN UR-MCNC: NEGATIVE MG/DL
ALP SERPL-CCNC: 119 U/L (ref 40–150)
ALT SERPL W P-5'-P-CCNC: 17 U/L (ref 0–70)
ANION GAP SERPL CALCULATED.3IONS-SCNC: 4 MMOL/L (ref 3–14)
APPEARANCE UR: CLEAR
AST SERPL W P-5'-P-CCNC: 18 U/L (ref 0–45)
BASOPHILS # BLD AUTO: 0 10E9/L (ref 0–0.2)
BASOPHILS NFR BLD AUTO: 0.3 %
BILIRUB SERPL-MCNC: 0.9 MG/DL (ref 0.2–1.3)
BILIRUB UR QL STRIP: NEGATIVE
BUN SERPL-MCNC: 21 MG/DL (ref 7–30)
CALCIUM SERPL-MCNC: 8.7 MG/DL (ref 8.5–10.1)
CHLORIDE SERPL-SCNC: 104 MMOL/L (ref 94–109)
CO2 SERPL-SCNC: 30 MMOL/L (ref 20–32)
COLOR UR AUTO: NORMAL
CREAT SERPL-MCNC: 1.17 MG/DL (ref 0.66–1.25)
DIFFERENTIAL METHOD BLD: ABNORMAL
EOSINOPHIL # BLD AUTO: 0.1 10E9/L (ref 0–0.7)
EOSINOPHIL NFR BLD AUTO: 0.5 %
ERYTHROCYTE [DISTWIDTH] IN BLOOD BY AUTOMATED COUNT: 12.5 % (ref 10–15)
GFR SERPL CREATININE-BSD FRML MDRD: 64 ML/MIN/{1.73_M2}
GLUCOSE SERPL-MCNC: 119 MG/DL (ref 70–99)
GLUCOSE UR STRIP-MCNC: NEGATIVE MG/DL
HCT VFR BLD AUTO: 47.4 % (ref 40–53)
HGB BLD-MCNC: 15.5 G/DL (ref 13.3–17.7)
HGB UR QL STRIP: NEGATIVE
IMM GRANULOCYTES # BLD: 0.1 10E9/L (ref 0–0.4)
IMM GRANULOCYTES NFR BLD: 0.5 %
KETONES UR STRIP-MCNC: NEGATIVE MG/DL
LEUKOCYTE ESTERASE UR QL STRIP: NEGATIVE
LIPASE SERPL-CCNC: 81 U/L (ref 73–393)
LYMPHOCYTES # BLD AUTO: 1.4 10E9/L (ref 0.8–5.3)
LYMPHOCYTES NFR BLD AUTO: 11.8 %
MCH RBC QN AUTO: 30.1 PG (ref 26.5–33)
MCHC RBC AUTO-ENTMCNC: 32.7 G/DL (ref 31.5–36.5)
MCV RBC AUTO: 92 FL (ref 78–100)
MONOCYTES # BLD AUTO: 0.7 10E9/L (ref 0–1.3)
MONOCYTES NFR BLD AUTO: 5.8 %
NEUTROPHILS # BLD AUTO: 9.3 10E9/L (ref 1.6–8.3)
NEUTROPHILS NFR BLD AUTO: 81.1 %
NITRATE UR QL: NEGATIVE
NRBC # BLD AUTO: 0 10*3/UL
NRBC BLD AUTO-RTO: 0 /100
PH UR STRIP: 6.5 PH (ref 5–7)
PLATELET # BLD AUTO: 197 10E9/L (ref 150–450)
POTASSIUM SERPL-SCNC: 3.8 MMOL/L (ref 3.4–5.3)
PROT SERPL-MCNC: 7.4 G/DL (ref 6.8–8.8)
RBC # BLD AUTO: 5.15 10E12/L (ref 4.4–5.9)
SODIUM SERPL-SCNC: 138 MMOL/L (ref 133–144)
SOURCE: NORMAL
SP GR UR STRIP: 1.01 (ref 1–1.03)
UROBILINOGEN UR STRIP-MCNC: NORMAL MG/DL (ref 0–2)
WBC # BLD AUTO: 11.5 10E9/L (ref 4–11)

## 2020-07-23 PROCEDURE — 83690 ASSAY OF LIPASE: CPT | Performed by: EMERGENCY MEDICINE

## 2020-07-23 PROCEDURE — 80053 COMPREHEN METABOLIC PANEL: CPT | Performed by: EMERGENCY MEDICINE

## 2020-07-23 PROCEDURE — 99284 EMERGENCY DEPT VISIT MOD MDM: CPT | Mod: 25

## 2020-07-23 PROCEDURE — 96374 THER/PROPH/DIAG INJ IV PUSH: CPT

## 2020-07-23 PROCEDURE — 74176 CT ABD & PELVIS W/O CONTRAST: CPT

## 2020-07-23 PROCEDURE — 85025 COMPLETE CBC W/AUTO DIFF WBC: CPT | Performed by: EMERGENCY MEDICINE

## 2020-07-23 PROCEDURE — 81003 URINALYSIS AUTO W/O SCOPE: CPT | Performed by: EMERGENCY MEDICINE

## 2020-07-23 PROCEDURE — 25000128 H RX IP 250 OP 636: Performed by: EMERGENCY MEDICINE

## 2020-07-23 RX ORDER — KETOROLAC TROMETHAMINE 15 MG/ML
15 INJECTION, SOLUTION INTRAMUSCULAR; INTRAVENOUS ONCE
Status: COMPLETED | OUTPATIENT
Start: 2020-07-23 | End: 2020-07-23

## 2020-07-23 RX ORDER — TAMSULOSIN HYDROCHLORIDE 0.4 MG/1
0.4 CAPSULE ORAL AT BEDTIME
Qty: 7 CAPSULE | Refills: 0 | Status: SHIPPED | OUTPATIENT
Start: 2020-07-23 | End: 2020-07-30

## 2020-07-23 RX ORDER — ONDANSETRON 4 MG/1
4 TABLET, ORALLY DISINTEGRATING ORAL EVERY 8 HOURS PRN
Qty: 15 TABLET | Refills: 0 | Status: SHIPPED | OUTPATIENT
Start: 2020-07-23

## 2020-07-23 RX ORDER — TRAMADOL HYDROCHLORIDE 50 MG/1
50 TABLET ORAL EVERY 6 HOURS PRN
Qty: 8 TABLET | Refills: 0 | Status: SHIPPED | OUTPATIENT
Start: 2020-07-23 | End: 2020-07-26

## 2020-07-23 RX ADMIN — KETOROLAC TROMETHAMINE 15 MG: 15 INJECTION, SOLUTION INTRAMUSCULAR; INTRAVENOUS at 03:34

## 2020-07-23 ASSESSMENT — ENCOUNTER SYMPTOMS
DYSURIA: 0
ABDOMINAL PAIN: 1
COUGH: 0
HEMATURIA: 0
NAUSEA: 1
DIFFICULTY URINATING: 0
ABDOMINAL DISTENTION: 0
VOMITING: 0
SORE THROAT: 0
FREQUENCY: 1
APPETITE CHANGE: 0
FEVER: 0
SHORTNESS OF BREATH: 0
FLANK PAIN: 1
DIARRHEA: 0
CONFUSION: 0
FATIGUE: 0
ACTIVITY CHANGE: 0

## 2020-07-23 ASSESSMENT — MIFFLIN-ST. JEOR
SCORE: 2273.58
SCORE: 2272.75

## 2020-07-23 NOTE — ED PROVIDER NOTES
History     Chief Complaint:  Flank Pain    HPI   Leodan Yanez is a 69 year old male who presents with sudden onset of left flank pain which radiates anteriorly towards his groin which began several hours prior to presentation.  The patient reports that it is reminiscent of a kidney stone he had many years ago.  He also reports some urinary urgency.  No hematuria or dysuria.  He denies any testicular swelling or testicular pain.  No fever.  He reports nausea but no vomiting.  He denies any chest pain or shortness of breath.  He denies any right-sided pain or upper abdominal pain.  He denies any other symptoms at this time.  He states that he took 2 tramadol prior to arrival to help with the pain and this has somewhat improved his symptoms.    Allergies:  No Known Allergies     Medications:    Lotrel  Celebrex  Fluoxetine  Gabapentin  Lisinopril-hydrochlorothiazide  Metoprolol  Nifedipine  Ofloxacin  Prednisolone acetate  Flomax    Past Medical History:    A-fib  Anema  Arrhythmia  Arthritis  GERD  Hernia  Hyperlipemia  Hypertension  Kidney stone  Neuropathy  Retinal detachment  Sleep apnea  Acute GI bleeding  Punctate keratitis right eye  Myopic retinopathy  Aphakia, bilateral  Presbyopia  Anisometropia  Obesity    Past Surgical History:    Shoulder rotator cuff repairs  Bypass gastric duodenal switch  Laparoscopic repair of gastric ulcer with lisandra patch  Cataract IOL  Colonoscopy  Exchange intraocular lens implant  Herniorrhaphy incisional  Implant secondary intraocular lens implant  Repostion intraocular lens  Right knee replacement  Vitrectomy parsplana     Family History:    Mother: Hypertension  Father: Diabetes, hypertension  Brother: Hypertension, hyperlipidemia, diabetes  Sister: Diabetes, hypertension, hyperlipidemia    Social History:  Smoking Status: Never Smoker  Smokeless Tobacco: Never Used  Alcohol Use: Negative  Drug Use: Negative  PCP: Stevie Krueger  Marital Status:      Review of  "Systems   Constitutional: Negative for activity change, appetite change, fatigue and fever.   HENT: Negative for congestion and sore throat.    Respiratory: Negative for cough and shortness of breath.    Cardiovascular: Negative for chest pain and leg swelling.   Gastrointestinal: Positive for abdominal pain and nausea. Negative for abdominal distention, diarrhea and vomiting.   Genitourinary: Positive for flank pain and frequency. Negative for decreased urine volume, difficulty urinating, dysuria, hematuria, scrotal swelling and testicular pain.   Skin: Negative for rash.   Neurological: Positive for syncope.   Psychiatric/Behavioral: Negative for confusion.   All other systems reviewed and are negative.      Physical Exam     Patient Vitals for the past 24 hrs:   BP Temp Temp src Pulse Heart Rate Resp SpO2 Height Weight   07/23/20 0300 (!) 140/103 -- -- 60 -- -- 98 % -- --   07/23/20 0128 -- -- -- -- -- -- -- -- 142.8 kg (314 lb 13.1 oz)   07/23/20 0126 (!) 151/108 97.6  F (36.4  C) Oral 65 65 18 99 % 1.88 m (6' 2\") 142.9 kg (315 lb)       Physical Exam  General: Well appearing, nontoxic. Resting comfortably  Head:  Scalp, face, and head appear normal  Eyes:  Pupils are equal, round    Conjunctivae non-injected and sclerae white  ENT:    The external nose is normal    Pinnae are normal    The oropharynx is normal, mucous membranes moist    Uvula is in the midline  Neck:  Normal range of motion    There is no rigidity noted    Trachea is in the midline  CV:  Regular rate and rhythm     Normal S1/S2, no S3/S4    No murmur or rub  Resp:  Lungs are clear and equal bilaterally    There is no tachypnea    No increased work of breathing    No rales, wheezing, or rhonchi  GI:  Abdomen is soft, morbidly obese, no rigidity or guarding    No distension, or mass. + left CVA tenderness.    No tenderness or rebound tenderness   MS:  Normal muscular tone    Symmetric motor strength  Skin:  No rash or acute skin lesions " noted  Neuro: Awake and alert    Speech is normal and fluent    Moves all extremities spontaneously  Psych:  Normal affect.  Appropriate interactions.      Emergency Department Course   Imaging:  Radiology findings were communicated with the patient who voiced understanding of the findings.    CT Abdomen Pelvis w/o Contrast  1.  2 mm left distal ureteral stone resulting in mild left-sided hydroureteronephrosis.  2.  Diverticulosis without evidence of diverticulitis.  3.  Status post gastric bypass surgery and cholecystectomy.  4.  Coronary artery disease and atherosclerotic vascular disease.    Laboratory:  Laboratory findings were communicated with the patient who voiced understanding of the findings.    UA reflex to Microscopic and Culture: WNL o/w negative    CBC: WBC 11.5 (H), HGB 15.5,   CMP: Glucose 119 (H) o/w WNL (Creatinine 1.17)    Lipase: 81    Interventions:   Toradol 15mg IV  Medications   ketorolac (TORADOL) injection 15 mg (15 mg Intravenous Given 7/23/20 0334)        Emergency Department Course:    Past medical records, nursing notes, and vitals reviewed.   I performed an exam of the patient and obtained history, as documented above.     IV was inserted and blood was drawn for laboratory testing, results above.    The patient provided a urine sample here in the emergency department. This was sent for laboratory testing, findings above.    The patient was sent for a CT while in the emergency department, findings above     I rechecked the patient.     I personally reviewed the laboratory and imaging results with the Patient and answered all related questions prior to discharge.     Findings and plan explained to the Patient. Patient discharged home with instructions regarding supportive care, medications, and reasons to return. The importance of close follow-up was reviewed.     Impression & Plan      Medical Decision Making:  Leodan Yanez is a 69 year old male who presented with unilateral  flank and abdominal pain consistent with renal colic. CT confirms a small distal 2mm ureteral stone. Pain is controlled with interventions in the Emergency Department. There is no fever or evidence of a urinary tract infection. The patient will be discharged with analgesics. Flomax will be prescribed daily to attempt to ease stone passage.   Zofran was prescribed for nausea.  I considered other etiologies for these symptoms including AAA, diverticulitis, musculoskeletal pain, and pyelonephritis but these are unlikely given the otherwise normal CT scan and urinalysis. Renal function is normal.  The patient is instructed to return if increasing pain not controlled with pain meds, vomiting, and fever. Strain urine to look for stone, if detected, submit to primary doctor for lab analysis. Instructions were given to follow up with urology within one week, sooner if pain continues, as retrieval of the stone may be required for refractory symptoms. Patient agreeable with plan of care. He was discharged in stable condition.    Diagnosis:    ICD-10-CM    1. Ureterolithiasis  N20.1    2. Bilateral kidney stones  N20.0      Disposition:   The patient is discharged to home.    Discharge Medications:  New Prescriptions    ONDANSETRON (ZOFRAN ODT) 4 MG ODT TAB    Take 1 tablet (4 mg) by mouth every 8 hours as needed for nausea or vomiting    TAMSULOSIN (FLOMAX) 0.4 MG CAPSULE    Take 1 capsule (0.4 mg) by mouth At Bedtime for 7 days Stop taking after the kidney stone passes.    TRAMADOL (ULTRAM) 50 MG TABLET    Take 1 tablet (50 mg) by mouth every 6 hours as needed for severe pain       Scribe Disclosure:  I, Edenilson Corrales, am serving as a scribe at 2:02 AM on 7/23/2020 to document services personally performed by Zan Humphries MD based on my observations and the provider's statements to me.  [facility] EMERGENCY DEPARTMENT       Zan Humphries MD  07/23/20 0331

## 2020-07-23 NOTE — ED TRIAGE NOTES
Here for left flank pain started around 5pm associated with abdominal upset, urge to urinate/bowel movement, and can't sleep due to pain. ABCs intact.

## 2020-07-23 NOTE — ED AVS SNAPSHOT
[facility] Emergency Department  [address]  [facility] [address]  Phone:  [phone]  Fax:  [phone]                                    Leodan Yanez   MRN: [id number]    Department:  [facility] Emergency Department   Date of Visit:  7/23/2020           After Visit Summary Signature Page    I have received my discharge instructions, and my questions have been answered. I have discussed any challenges I see with this plan with the nurse or doctor.    ..........................................................................................................................................  Patient/Patient Representative Signature      ..........................................................................................................................................  Patient Representative Print Name and Relationship to Patient    ..................................................               ................................................  Date                                   Time    ..........................................................................................................................................  Reviewed by Signature/Title    ...................................................              ..............................................  Date                                               Time          22EPIC Rev 08/18

## 2020-07-31 DIAGNOSIS — N20.0 CALCULUS OF KIDNEY: Primary | ICD-10-CM

## 2021-05-17 NOTE — Clinical Note
Asad De La O, I think he needs intraocular lens exchange and prefers to do it soon. consider intraocular lens exchange He will see you in 1-2 weeks  abnormal

## 2021-05-29 ENCOUNTER — RECORDS - HEALTHEAST (OUTPATIENT)
Dept: ADMINISTRATIVE | Facility: CLINIC | Age: 71
End: 2021-05-29

## 2021-05-30 ENCOUNTER — RECORDS - HEALTHEAST (OUTPATIENT)
Dept: ADMINISTRATIVE | Facility: CLINIC | Age: 71
End: 2021-05-30

## 2021-06-09 NOTE — TELEPHONE ENCOUNTER
"Pt reports he noticed very dark stool when he wiped this afternoon. Stool after was brown. Pt denies rectal bleeding but very concerned because of a history of GI bleed. Pt also very concerned with what hospital he was at for his previous GI bleed and advised Writer he had never been to [facility] although from records in  system pt was there for GI bleeding in 2017. Pt seemed to have difficulty answering questions frankly and did not believe Writer about information in his chart. Pt very concerned because \"waited too long last time\". Pt reports he does not have bright red bleeding or other acute symptoms.     Writer advised pt if he thinks he is having symptoms similar to previous GI bleed he should go to the ER now but per Writer assessment it does not sound like an emergency. Provided pt with [facility] phone number to contact for medical records.    Pt took phone number and stated \"sorry I bothered you\" and hung up.     Reason for Disposition    Age > 50 years    Additional Information    Tarry or jet black-colored stool    Negative: Shock suspected (e.g., cold/pale/clammy skin, too weak to stand, low BP, rapid pulse)    Negative: Difficult to awaken or acting confused (e.g., disoriented, slurred speech)    Negative: Passed out (i.e., lost consciousness, collapsed and was not responding)    Negative: [1] Vomiting AND [2] contains red blood or black (\"coffee ground\") material  (Exception: few red streaks in vomit that only happened once)    Negative: Sounds like a life-threatening emergency to the triager    Protocols used: RECTAL BLEEDING-A-AH, STOOLS - UNUSUAL COLOR-A-AH      "

## 2021-11-19 ENCOUNTER — LAB (OUTPATIENT)
Dept: LAB | Facility: CLINIC | Age: 71
End: 2021-11-19
Payer: COMMERCIAL

## 2021-11-19 ENCOUNTER — MEDICAL CORRESPONDENCE (OUTPATIENT)
Dept: HEALTH INFORMATION MANAGEMENT | Facility: CLINIC | Age: 71
End: 2021-11-19

## 2021-11-19 DIAGNOSIS — Z47.1 AFTERCARE FOLLOWING JOINT REPLACEMENT: ICD-10-CM

## 2021-11-19 DIAGNOSIS — M25.561 RIGHT KNEE PAIN: Primary | ICD-10-CM

## 2021-11-19 LAB
BASOPHILS # BLD AUTO: 0 10E3/UL (ref 0–0.2)
BASOPHILS NFR BLD AUTO: 1 %
CRP SERPL-MCNC: 3.5 MG/L (ref 0–8)
EOSINOPHIL # BLD AUTO: 0.2 10E3/UL (ref 0–0.7)
EOSINOPHIL NFR BLD AUTO: 2 %
ERYTHROCYTE [DISTWIDTH] IN BLOOD BY AUTOMATED COUNT: 13.1 % (ref 10–15)
ERYTHROCYTE [SEDIMENTATION RATE] IN BLOOD BY WESTERGREN METHOD: 6 MM/HR (ref 0–20)
HCT VFR BLD AUTO: 45.4 % (ref 40–53)
HGB BLD-MCNC: 14.7 G/DL (ref 13.3–17.7)
IMM GRANULOCYTES # BLD: 0 10E3/UL
IMM GRANULOCYTES NFR BLD: 0 %
LYMPHOCYTES # BLD AUTO: 1.9 10E3/UL (ref 0.8–5.3)
LYMPHOCYTES NFR BLD AUTO: 23 %
MCH RBC QN AUTO: 30.4 PG (ref 26.5–33)
MCHC RBC AUTO-ENTMCNC: 32.4 G/DL (ref 31.5–36.5)
MCV RBC AUTO: 94 FL (ref 78–100)
MONOCYTES # BLD AUTO: 0.6 10E3/UL (ref 0–1.3)
MONOCYTES NFR BLD AUTO: 8 %
NEUTROPHILS # BLD AUTO: 5.3 10E3/UL (ref 1.6–8.3)
NEUTROPHILS NFR BLD AUTO: 66 %
NRBC # BLD AUTO: 0 10E3/UL
NRBC BLD AUTO-RTO: 0 /100
PLATELET # BLD AUTO: 210 10E3/UL (ref 150–450)
RBC # BLD AUTO: 4.84 10E6/UL (ref 4.4–5.9)
WBC # BLD AUTO: 8.1 10E3/UL (ref 4–11)

## 2021-11-19 PROCEDURE — 85652 RBC SED RATE AUTOMATED: CPT

## 2021-11-19 PROCEDURE — 36415 COLL VENOUS BLD VENIPUNCTURE: CPT

## 2021-11-19 PROCEDURE — 86140 C-REACTIVE PROTEIN: CPT

## 2021-11-19 PROCEDURE — 85025 COMPLETE CBC W/AUTO DIFF WBC: CPT

## 2022-01-19 NOTE — PATIENT INSTRUCTIONS
Body Location Override (Optional): Left upper forehead
ULTRASOUND GUIDED SEROMA ASPIRATION      Date: 12-22-17   Time: 1:00 PM     Location: [facility] [address] [place]  [address]        Please check in at 12:45 PM     
Which Doctor Performed Mohs? (Optional): Gloria Speck

## 2022-04-30 NOTE — MR AVS SNAPSHOT
After Visit Summary   12/18/2017    Leodan Yanez    MRN: [id number]           Patient Information     Date Of Birth          1950        Visit Information        Provider Department      12/18/2017 11:45 AM Cayetano Vivar MD [facility]      Today's Diagnoses     Surgical followup visit    -  1      Care Instructions    ULTRASOUND GUIDED SEROMA ASPIRATION      Date: 12-22-17   Time: 1:00 PM     Location: [facility]  [address] [place]  [address]        Please check in at 12:45 PM             Follow-ups after your visit        Your next 10 appointments already scheduled     Dec 21, 2017  2:15 PM CST   Post-Op with John Lindsey MD   Eye Clinic ([facility])    [address] [facility]  9th [address] Clin [address]  [facility] [address]   [phone]            Dec 22, 2017  1:00 PM CST   US DRAIN OF SEROMA/HEMATOMA/ABSCESS/CYST with RHUS1, RH IR RAD, RH IMAGING NURSE   [facility] Ultrasound ([facility])    [address] [facility] [address]   [phone]           Tell us in advance if there s any chance you may be pregnant.  Bring a list of your medicines to the exam. Include vitamins, minerals and over-the-counter drugs.  If you take blood thinners, you may need to stop taking them a few days before treatment. Talk to your doctor before stopping these medicines. You will need a blood test the morning of your exam.   Stop taking Coumadin (warfarin) 3 days before your exam. Restart the day after your exam.   If you take aspirin, you may need to stop taking it 3 days before your scan.   If you take Plavix, Ticlid, Pletal or Persantine, you may need to stop taking them 5 days before your scan. Please talk to your doctor before stopping these medicines.  If you will receive sedation for this test (medicine to help you relax):   See your family doctor for  Patient feeling light headed again, states she feels it's due to old birth control implant in left arm. States she was seen a couple of days ago and yesterday,for the same.    an exam within 30 days of treatment.   Plan for an adult to drive you home and stay with you for at least 6 hours.   Follow the eating and drinking guidelines checked below:   No eating or drinking for 4 hours before your test. You may take medicine with small sips of water.   If you have diabetes:If you take insulin, call your diabetes care team. Do not take diabetes pills on the morning of your test. If you take metformin (Avandamet, Glucophage, Glucovance, Metaglip) and received contrast, wait 48 hours before re-starting this medicine.  Please call the Imaging Department at your exam site with any questions.            Dec 29, 2017 10:00 AM CST   Post-Op with Adeel Tejeda DO   Eye Clinic ([facility])    [address]  [address] [place] [address]   [phone]            Jan 18, 2018  3:30 PM CST   Post-Op with John Lindsey MD   Eye Clinic ([facility])    [address]  [address] [place] [id number]   [phone]              Future tests that were ordered for you today     Open Future Orders        Priority Expected Expires Ordered    US Drainage Seroma/Hematoma Abscess/Cyst Routine 12/18/2017 12/18/2018 12/18/2017            Who to contact     If you have questions or need follow up information about today's clinic visit or your schedule please contact [facility] directly at [phone].  Normal or non-critical lab and imaging results will be communicated to you by MyChart, letter or phone within 4 business days after the clinic has received the results. If you do not hear from us within 7 days, please contact the clinic through MyChart or phone. If you have a critical or abnormal lab result, we will notify you by phone as soon as possible.  Submit refill requests through [employer] or call your pharmacy and they will forward the refill request to us. Please allow 3 business days  "for your refill to be completed.          Additional Information About Your Visit        [employer] Information     [employer] lets you send messages to your doctor, view your test results, renew your prescriptions, schedule appointments and more. To sign up, go to [email or web address] . Click on \"Log in\" on the left side of the screen, which will take you to the Welcome page. Then click on \"Sign up Now\" on the right side of the page.     You will be asked to enter the access code listed below, as well as some personal information. Please follow the directions to create your username and password.     Your access code is: [email or web address]  Expires: 2018 10:31 AM     Your access code will  in 90 days. If you need help or a new code, please call your [facility] clinic or [phone].        Care EveryWhere ID     This is your Care EveryWhere ID. This could be used by other organizations to access your [facility] medical records  [id number]        Your Vitals Were     Pulse Respirations Height Pulse Oximetry BMI (Body Mass Index)       72 16 6' 1\" (1.854 m) 97% 41.03 kg/m2        Blood Pressure from Last 3 Encounters:   17 112/68   17 110/68   17 108/66    Weight from Last 3 Encounters:   17 (!) 311 lb (141.1 kg)   17 (!) 311 lb (141.1 kg)   11/10/17 (!) 311 lb (141.1 kg)              Today, you had the following     No orders found for display         Today's Medication Changes          These changes are accurate as of: 17 11:47 AM.  If you have any questions, ask your nurse or doctor.               These medicines have changed or have updated prescriptions.        Dose/Directions    * cephALEXin 500 MG capsule   Commonly known as:  KEFLEX   This may have changed:  Another medication with the same name was added. Make sure you understand how and when to take each.   Used for:  Incisional infection, initial encounter   Changed by:  Cayetano Vivar MD        Dose:  500 mg   Take 1 " capsule (500 mg) by mouth 3 times daily   Quantity:  30 capsule   Refills:  0       * cephALEXin 500 MG capsule   Commonly known as:  KEFLEX   This may have changed:  You were already taking a medication with the same name, and this prescription was added. Make sure you understand how and when to take each.   Used for:  Surgical followup visit   Changed by:  Cayetano Vivar MD        Dose:  500 mg   Take 1 capsule (500 mg) by mouth 3 times daily   Quantity:  60 capsule   Refills:  0       * Notice:  This list has 2 medication(s) that are the same as other medications prescribed for you. Read the directions carefully, and ask your doctor or other care provider to review them with you.         Where to get your medicines      These medications were sent to [facility]/pharmacy #[id number] - [place], [place] - [address] [facility]  [address] [facility] [address]     Phone:  [phone]     cephALEXin 500 MG capsule                Primary Care Provider Office Phone # Fax #    Stevie Krueger [phone]       [facility] [address] [facility] [address]        Equal Access to Services     [facility]: Hadii lg herrmanno Socolleen, waaxda lucecily, qaybta kaalmada yvonne, karen escobar . So [facility] [phone].    ATENCIÓN: Si habla español, tiene a martinez disposición servicios gratuitos de asistencia lingüística. [facility] [phone].    We comply with applicable federal civil rights laws and [place] laws. We do not discriminate on the basis of race, color, national origin, age, disability, sex, sexual orientation, or gender identity.            Thank you!     Thank you for choosing [facility]  for your care. Our goal is always to provide you with excellent care. Hearing back from our patients is one way we can continue to improve our services. Please take a few minutes to complete the written survey that you may receive in the mail  after your visit with us. Thank you!             Your Updated Medication List - Protect others around you: Learn how to safely use, store and throw away your medicines at www.disposemymeds.org.          This list is accurate as of: 12/18/17 11:47 AM.  Always use your most recent med list.                   Brand Name Dispense Instructions for use Diagnosis    acetaminophen 325 MG tablet    TYLENOL    250 tablet    Take 2 tablets by mouth every 6 hours as needed. Pain.    Upper GI bleed       aspirin 81 MG chewable tablet      Take 81 mg by mouth daily        CELEBREX PO      Take 200 mg by mouth every other day        * cephALEXin 500 MG capsule    KEFLEX    30 capsule    Take 1 capsule (500 mg) by mouth 3 times daily    Incisional infection, initial encounter       * cephALEXin 500 MG capsule    KEFLEX    60 capsule    Take 1 capsule (500 mg) by mouth 3 times daily    Surgical followup visit       clindamycin 300 MG capsule    CLEOCIN    28 capsule    Take 1 capsule (300 mg) by mouth 4 times daily    Incisional infection       FLUOXETINE HCL PO      Take 20 mg by mouth 2 times daily        lisinopril-hydrochlorothiazide 20-12.5 MG per tablet    PRINZIDE/ZESTORETIC     Take 1 tablet by mouth 2 times daily        metoprolol 200 MG 24 hr tablet    TOPROL-XL     Take 200 mg by mouth daily        NIFEDIPINE PO      Take 90 mg by mouth daily        tamsulosin 0.4 MG capsule    FLOMAX     Take 0.4 mg by mouth daily        * Notice:  This list has 2 medication(s) that are the same as other medications prescribed for you. Read the directions carefully, and ask your doctor or other care provider to review them with you.

## 2022-05-14 NOTE — PROGRESS NOTES
Patient was notified of these results. He is currently dealing with his eye surgery. He will contact us when ready to consider hernia repair.  Cayetano Vivar MD  8/16/2017 10:29 AM Pt awake in bed, calm and cooperative at this time. Pt denies any pain or discomfort. Heparin drip infusing at 13 ml/hr. Scheduled meds are given to the as ordered. Pt denies other needs at present. Bed alarm on. Tele camera in place.  Call light and item need in reach Electronically signed by Adia Joya RN on 5/14/2022 at 11:11 AM

## 2023-09-14 ENCOUNTER — OFFICE VISIT (OUTPATIENT)
Dept: UROLOGY | Facility: CLINIC | Age: 73
End: 2023-09-14
Payer: COMMERCIAL

## 2023-09-14 DIAGNOSIS — R39.89 SUSPECTED UTI: ICD-10-CM

## 2023-09-14 DIAGNOSIS — N39.0 RECURRENT UTI: Primary | ICD-10-CM

## 2023-09-14 DIAGNOSIS — N52.9 ERECTILE DYSFUNCTION, UNSPECIFIED ERECTILE DYSFUNCTION TYPE: ICD-10-CM

## 2023-09-14 LAB
ALBUMIN UR-MCNC: NEGATIVE MG/DL
APPEARANCE UR: ABNORMAL
BILIRUB UR QL STRIP: NEGATIVE
COLOR UR AUTO: YELLOW
GLUCOSE UR STRIP-MCNC: NEGATIVE MG/DL
HGB UR QL STRIP: NEGATIVE
KETONES UR STRIP-MCNC: NEGATIVE MG/DL
LEUKOCYTE ESTERASE UR QL STRIP: ABNORMAL
NITRATE UR QL: POSITIVE
PH UR STRIP: 5.5 [PH] (ref 5–7)
RESIDUAL VOLUME (RV) (EXTERNAL): 25
SP GR UR STRIP: 1.02 (ref 1–1.03)
UROBILINOGEN UR STRIP-ACNC: 1 E.U./DL

## 2023-09-14 PROCEDURE — 87088 URINE BACTERIA CULTURE: CPT | Performed by: STUDENT IN AN ORGANIZED HEALTH CARE EDUCATION/TRAINING PROGRAM

## 2023-09-14 PROCEDURE — 51798 US URINE CAPACITY MEASURE: CPT | Performed by: STUDENT IN AN ORGANIZED HEALTH CARE EDUCATION/TRAINING PROGRAM

## 2023-09-14 PROCEDURE — 99204 OFFICE O/P NEW MOD 45 MIN: CPT | Mod: 25 | Performed by: STUDENT IN AN ORGANIZED HEALTH CARE EDUCATION/TRAINING PROGRAM

## 2023-09-14 PROCEDURE — 87186 SC STD MICRODIL/AGAR DIL: CPT | Performed by: STUDENT IN AN ORGANIZED HEALTH CARE EDUCATION/TRAINING PROGRAM

## 2023-09-14 PROCEDURE — 87086 URINE CULTURE/COLONY COUNT: CPT | Performed by: STUDENT IN AN ORGANIZED HEALTH CARE EDUCATION/TRAINING PROGRAM

## 2023-09-14 PROCEDURE — 81003 URINALYSIS AUTO W/O SCOPE: CPT | Mod: QW | Performed by: STUDENT IN AN ORGANIZED HEALTH CARE EDUCATION/TRAINING PROGRAM

## 2023-09-14 RX ORDER — AMITRIPTYLINE HYDROCHLORIDE 50 MG/1
50 TABLET ORAL AT BEDTIME
COMMUNITY
Start: 2023-08-05

## 2023-09-14 RX ORDER — PRAVASTATIN SODIUM 20 MG
1 TABLET ORAL AT BEDTIME
COMMUNITY
Start: 2023-01-06

## 2023-09-14 RX ORDER — SILDENAFIL 100 MG/1
100 TABLET, FILM COATED ORAL
COMMUNITY
Start: 2023-01-06 | End: 2024-03-08

## 2023-09-14 RX ORDER — AMLODIPINE BESYLATE 10 MG/1
10 TABLET ORAL DAILY
COMMUNITY

## 2023-09-14 RX ORDER — TADALAFIL 5 MG/1
5 TABLET ORAL EVERY 24 HOURS
Qty: 30 TABLET | Refills: 11 | Status: SHIPPED | OUTPATIENT
Start: 2023-09-14 | End: 2023-11-30 | Stop reason: ALTCHOICE

## 2023-09-14 RX ORDER — PANTOPRAZOLE SODIUM 40 MG/1
40 TABLET, DELAYED RELEASE ORAL DAILY
COMMUNITY

## 2023-09-14 NOTE — PROGRESS NOTES
[facility]  Main Office: [address]  [address]  [place] [address]       CHIEF COMPLAINT:  Recurrent uti    HISTORY:   72 yo M w/ h/o RYGB in 2006,  h/o nephrolithiasis referred with concern for recurrent uti    Reviewed outside notes, on 4/17/2023 saw family med, SILVINO with pyuria, bacteruria. Since January 2023 has had at least 4 infections per patient.    Also had gross hematuria in June 2023.    Patient complains of significant neuropathy in lower extremities    AUA symptom score 9-2-7-0-0-0-1 = 1 qol delighted.   He is on long term tamsulosin 0.4 mg daily    In 2020 he had a left distal ureteral stone but doesn't recall this. He did have other stones after the prior RYGB     Also having issues with erectile dysfunction      PAST MEDICAL HISTORY:   Past Medical History:   Diagnosis Date    A-fib (H)     Anemia     with ulcer    Arrhythmia     Arthritis     shoulders, knees    Gastroesophageal reflux disease     Hernia of unspecified site of abdominal cavity without mention of obstruction or gangrene     History of blood transfusion     Hyperlipemia     Hypertension     Kidney stone     Neuropathy     from waist down    Retinal detachment 1993    [facility]    Sleep apnea     cpap       PAST SURGICAL HISTORY:   Past Surgical History:   Procedure Laterality Date    rut shoulder rotator cuff repairs      CATARACT IOL, RT/LT Left 12/20/2017    explant dislocated IOL/anterior vitrectomy    COLONOSCOPY      EXCHANGE INTRAOCULAR LENS IMPLANT Right 08/14/2017    Procedure: EXCHANGE INTRAOCULAR LENS IMPLANT;  RIGHT INTRAOCULAR LENS EXPLANT WITH SECONDARY  INTRAOCULAR LENS IMPLANT WITH  IRIS  FIXATION;  Surgeon: John Lindsey MD;  [facility]: [facility]    EYE SURGERY      GASTRIC BYPASS OPEN  06/20/2006    Dr. John Gannon, open RYGB w/ cholecystectomy @ [facility], 6/20/2006    HERNIORRHAPHY INCISIONAL (LOCATION) N/A 11/10/2017    Procedure: HERNIORRHAPHY INCISIONAL (LOCATION);  Incisional  "hernia Repair with mesh and component separation;  Surgeon: Cayetano Viavr MD;  Location: [facility] OR    IMPLANT SECONDARY INTRAOCULAR LENS IMPLANT Bilateral \"20 years ago\"    per patient    ORTHOPEDIC SURGERY      REPOSITION INTRAOCULAR LENS Right 05/12/2017    Procedure: REPOSITION INTRAOCULAR LENS;;  Surgeon: John Lindsey MD;  Location: [facility]    rt knee replacement      VITRECTOMY PARSPLANA WITH 25 GAUGE SYSTEM Right 05/12/2017    Procedure: VITRECTOMY PARSPLANA WITH 25 GAUGE SYSTEM;  RIGHT EYE VITRECTOMY PARSPLANA WITH 25 GAUGE SYSTEM, ENDOLASER,  REPOSIONING OF INTRAOCULAR LENS WITH SCLERAL FIXATION;  Surgeon: Rose Weston MD;  [facility] LAPAROSCOPIC REPAIR OF GASTRIC ULCER W/GALILEO PATCH         FAMILY HISTORY:   Family History   Problem Relation Age of Onset    Hypertension Mother     Diabetes Father     Hypertension Father     Diabetes Brother     Hypertension Brother     Hyperlipidemia Brother     Diabetes Sister     Hypertension Sister     Hyperlipidemia Sister     Glaucoma No family hx of     Macular Degeneration No family hx of     Retinal detachment No family hx of     Amblyopia No family hx of        SOCIAL HISTORY:   Social History     Tobacco Use    Smoking status: Never    Smokeless tobacco: Never   Substance Use Topics    Alcohol use: No        No Known Allergies      Current Outpatient Medications:     acetaminophen (TYLENOL) 325 MG tablet, Take 2 tablets by mouth every 6 hours as needed. Pain., Disp: 250 tablet, Rfl:     amLODIPine-benazepril (LOTREL) 10-20 MG per capsule, Take 1 capsule by mouth daily, Disp: , Rfl:     aspirin 81 MG chewable tablet, Take 81 mg by mouth daily, Disp: , Rfl:     Celecoxib (CELEBREX PO), Take 200 mg by mouth every other day , Disp: , Rfl:     FLUOXETINE HCL PO, Take 20 mg by mouth 2 times daily , Disp: , Rfl:     GABAPENTIN PO, Take by mouth daily, Disp: , Rfl:     lisinopril-hydrochlorothiazide (PRINZIDE/ZESTORETIC) 20-12.5 MG per " tablet, Take 1 tablet by mouth 2 times daily, Disp: , Rfl:     metoprolol (TOPROL-XL) 200 MG 24 hr tablet, Take 200 mg by mouth daily, Disp: , Rfl:     NIFEDIPINE PO, Take 90 mg by mouth daily, Disp: , Rfl:     ofloxacin (OCUFLOX) 0.3 % ophthalmic solution, Apply 1 drop to eye 4 times daily, Disp: , Rfl:     ondansetron (ZOFRAN ODT) 4 MG ODT tab, Take 1 tablet (4 mg) by mouth every 8 hours as needed for nausea or vomiting, Disp: 15 tablet, Rfl: 0    prednisoLONE acetate (PRED FORTE) 1 % ophthalmic susp, Apply 1 drop to eye 4 times daily, Disp: , Rfl:     tamsulosin (FLOMAX) 0.4 MG capsule, Take 0.4 mg by mouth daily , Disp: , Rfl:     Review Of Systems:  Skin: No rash, pruritis, or skin pigmentation  Eyes: No changes in vision  Ears/Nose/Throat: No changes in hearing, no nosebleeds  Respiratory: No shortness of breath, dyspnea on exertion, cough, or hemoptysis  Cardiovascular: No chest pain or palpitations  Gastrointestinal: No diarrhea or constipation. No abdominal pain. No hematochezia  Genitourinary: see HPI  Musculoskeletal: No pain or swelling of joints, normal range of motion  Neurologic: No weakness or tremors  Psychiatric: No recent changes in memory or mood  Hematologic/Lymphatic/Immunologic: No easy bruising or enlarged lymph nodes  Endocrine: No weight gain or loss      PHYSICAL EXAM:    There were no vitals taken for this visit.    General appearance: In NAD, conversant  HEENT: Normocephalic and atraumatic, anicteric sclera  Cardiovascular: Not examined  Respiratory: normal, non-labored breathing  Gastrointestinal: morbid obesity  Musculoskeletal: Not Examined  Peripheral Vascular/extremity: No peripheral edema  Skin: Normal temperature, turgor, and texture. No rash  Psychiatric: Appropriate affect, alert and oriented to person, place, and time    Penis: uncirc phallus, orthotopic and patent meatus  Scrotal Skin: normal  Testicles: normal bilat  Epididymis: normal bilat  Digital Rectal Exam: moderately  enlarged prostate; loose stool at the anus    Cystoscopy: Not done      UA RESULTS:  Recent Labs   Lab Test 09/14/23  1112   COLOR Yellow   APPEARANCE Slightly Cloudy*   URINEGLC Negative   URINEBILI Negative   URINEKETONE Negative   SG 1.020   UBLD Negative   URINEPH 5.5   PROTEIN Negative   UROBILINOGEN 1.0   NITRITE Positive*   LEUKEST Trace*       Bladder Scan:     Other Labs:      Imaging Studies:     PVR 25 ml      CLINICAL IMPRESSION:   74 yo M w/ h/o RYGB in 2006,  h/o nephrolithiasis referred with concern for recurrent uti, also c/o erectile dysfunction    Recurrent UTI: emptying well based on bladder scanner however patient has morbid obesity with large pannus, this could be falsely low. He has bilateral lower extremity neuropathy, concerned that this is also affecting bladder. Need workup with CT urogram followed by cystoscopy. May need urodynamics as well pending results of these tests. Today has positive nitrite, will send for culture    Erectile dysfunction: multifactorial but probably related to overall health issues of HLD, HTN, morbid obesity. He is no longer sexually active with his wife but he is taking sildenafil prn when he masturbates. Discussed potentially switching to tadalafil daily instead of sildenafil prn. He knows not to take them at the same time. Discussed black box warning again coadministration with nitrates with any PDEVI    Re: nephrolithiasis, had a distal stone in 2020 but doesn't have symptoms right now. Ct urogram will assess current stone burden. High risk of stone formation given h/o RYGB      PLAN:   Send urine for culture, treat as indicated, will plan 2 week course abx  Trial tadalafil 5 mg daily instead of sildenafil 100 mg prn  Continue tamsulosin 0.4 mg daily  Get CT urogram  Get formal bladder ultrasound for post void residual  Return for cystoscopy      Ryan Dye MD   [facility]  [phone] clinic phone

## 2023-09-14 NOTE — PATIENT INSTRUCTIONS
Trial tadalafil 5 mg daily instead of sildenafil 100 mg  Continue tamsulosin 0.4 mg daily  Get CT urogram  Return for cystoscopy

## 2023-09-14 NOTE — LETTER
9/14/2023       RE: Leodan Yanez  [address]  [facility] [address]     Dear Colleague,    Thank you for referring your patient, Leodan Yanez, to the [facility] [place] at [facility]. Please see a copy of my visit note below.    [facility]  Main Office: [address]  [address]  [place] [address]       CHIEF COMPLAINT:  Recurrent uti    HISTORY:   74 yo M w/ h/o RYGB in 2006,  h/o nephrolithiasis referred with concern for recurrent uti    Reviewed outside notes, on 4/17/2023 saw family med, UA with pyuria, bacteruria. Since January 2023 has had at least 4 infections per patient.    Also had gross hematuria in June 2023.    Patient complains of significant neuropathy in lower extremities    AUA symptom score 3-9-2-0-0-0-1 = 1 qol delighted.   He is on long term tamsulosin 0.4 mg daily    In 2020 he had a left distal ureteral stone but doesn't recall this. He did have other stones after the prior RYGB     Also having issues with erectile dysfunction      PAST MEDICAL HISTORY:   Past Medical History:   Diagnosis Date    A-fib (H)     Anemia     with ulcer    Arrhythmia     Arthritis     shoulders, knees    Gastroesophageal reflux disease     Hernia of unspecified site of abdominal cavity without mention of obstruction or gangrene     History of blood transfusion     Hyperlipemia     Hypertension     Kidney stone     Neuropathy     from waist down    Retinal detachment 1993    [facility]    Sleep apnea     cpap       PAST SURGICAL HISTORY:   Past Surgical History:   Procedure Laterality Date    rut shoulder rotator cuff repairs      CATARACT IOL, RT/LT Left 12/20/2017    explant dislocated IOL/anterior vitrectomy    COLONOSCOPY      EXCHANGE INTRAOCULAR LENS IMPLANT Right 08/14/2017    Procedure: EXCHANGE INTRAOCULAR LENS IMPLANT;  RIGHT INTRAOCULAR LENS EXPLANT WITH SECONDARY  INTRAOCULAR LENS IMPLANT WITH  IRIS  FIXATION;   "Surgeon: John Lindsey MD;  Location: [facility]    EYE SURGERY      GASTRIC BYPASS OPEN  06/20/2006    Dr. John Gannon, open RYGB w/ cholecystectomy @ [facility], 6/20/2006    HERNIORRHAPHY INCISIONAL (LOCATION) N/A 11/10/2017    Procedure: HERNIORRHAPHY INCISIONAL (LOCATION);  Incisional hernia Repair with mesh and component separation;  Surgeon: Cayetano Vivar MD;  Location: [facility] OR    IMPLANT SECONDARY INTRAOCULAR LENS IMPLANT Bilateral \"20 years ago\"    per patient    ORTHOPEDIC SURGERY      REPOSITION INTRAOCULAR LENS Right 05/12/2017    Procedure: REPOSITION INTRAOCULAR LENS;;  Surgeon: John Lindsey MD;  Location: [facility]    rt knee replacement      VITRECTOMY PARSPLANA WITH 25 GAUGE SYSTEM Right 05/12/2017    Procedure: VITRECTOMY PARSPLANA WITH 25 GAUGE SYSTEM;  RIGHT EYE VITRECTOMY PARSPLANA WITH 25 GAUGE SYSTEM, ENDOLASER,  REPOSIONING OF INTRAOCULAR LENS WITH SCLERAL FIXATION;  Surgeon: Rose Weston MD;  Location: [facility] LAPAROSCOPIC REPAIR OF GASTRIC ULCER W/GALILEO PATCH         FAMILY HISTORY:   Family History   Problem Relation Age of Onset    Hypertension Mother     Diabetes Father     Hypertension Father     Diabetes Brother     Hypertension Brother     Hyperlipidemia Brother     Diabetes Sister     Hypertension Sister     Hyperlipidemia Sister     Glaucoma No family hx of     Macular Degeneration No family hx of     Retinal detachment No family hx of     Amblyopia No family hx of        SOCIAL HISTORY:   Social History     Tobacco Use    Smoking status: Never    Smokeless tobacco: Never   Substance Use Topics    Alcohol use: No        No Known Allergies      Current Outpatient Medications:     acetaminophen (TYLENOL) 325 MG tablet, Take 2 tablets by mouth every 6 hours as needed. Pain., Disp: 250 tablet, Rfl:     amLODIPine-benazepril (LOTREL) 10-20 MG per capsule, Take 1 capsule by mouth daily, Disp: , Rfl:     aspirin 81 MG chewable tablet, Take 81 mg by " mouth daily, Disp: , Rfl:     Celecoxib (CELEBREX PO), Take 200 mg by mouth every other day , Disp: , Rfl:     FLUOXETINE HCL PO, Take 20 mg by mouth 2 times daily , Disp: , Rfl:     GABAPENTIN PO, Take by mouth daily, Disp: , Rfl:     lisinopril-hydrochlorothiazide (PRINZIDE/ZESTORETIC) 20-12.5 MG per tablet, Take 1 tablet by mouth 2 times daily, Disp: , Rfl:     metoprolol (TOPROL-XL) 200 MG 24 hr tablet, Take 200 mg by mouth daily, Disp: , Rfl:     NIFEDIPINE PO, Take 90 mg by mouth daily, Disp: , Rfl:     ofloxacin (OCUFLOX) 0.3 % ophthalmic solution, Apply 1 drop to eye 4 times daily, Disp: , Rfl:     ondansetron (ZOFRAN ODT) 4 MG ODT tab, Take 1 tablet (4 mg) by mouth every 8 hours as needed for nausea or vomiting, Disp: 15 tablet, Rfl: 0    prednisoLONE acetate (PRED FORTE) 1 % ophthalmic susp, Apply 1 drop to eye 4 times daily, Disp: , Rfl:     tamsulosin (FLOMAX) 0.4 MG capsule, Take 0.4 mg by mouth daily , Disp: , Rfl:     Review Of Systems:  Skin: No rash, pruritis, or skin pigmentation  Eyes: No changes in vision  Ears/Nose/Throat: No changes in hearing, no nosebleeds  Respiratory: No shortness of breath, dyspnea on exertion, cough, or hemoptysis  Cardiovascular: No chest pain or palpitations  Gastrointestinal: No diarrhea or constipation. No abdominal pain. No hematochezia  Genitourinary: see HPI  Musculoskeletal: No pain or swelling of joints, normal range of motion  Neurologic: No weakness or tremors  Psychiatric: No recent changes in memory or mood  Hematologic/Lymphatic/Immunologic: No easy bruising or enlarged lymph nodes  Endocrine: No weight gain or loss      PHYSICAL EXAM:    There were no vitals taken for this visit.    General appearance: In NAD, conversant  HEENT: Normocephalic and atraumatic, anicteric sclera  Cardiovascular: Not examined  Respiratory: normal, non-labored breathing  Gastrointestinal: morbid obesity  Musculoskeletal: Not Examined  Peripheral Vascular/extremity: No peripheral  edema  Skin: Normal temperature, turgor, and texture. No rash  Psychiatric: Appropriate affect, alert and oriented to person, place, and time    Penis: uncirc phallus, orthotopic and patent meatus  Scrotal Skin: normal  Testicles: normal bilat  Epididymis: normal bilat  Digital Rectal Exam: moderately enlarged prostate; loose stool at the anus    Cystoscopy: Not done      UA RESULTS:  Recent Labs   Lab Test 09/14/23  1112   COLOR Yellow   APPEARANCE Slightly Cloudy*   URINEGLC Negative   URINEBILI Negative   URINEKETONE Negative   SG 1.020   UBLD Negative   URINEPH 5.5   PROTEIN Negative   UROBILINOGEN 1.0   NITRITE Positive*   LEUKEST Trace*       Bladder Scan:     Other Labs:      Imaging Studies:     PVR 25 ml      CLINICAL IMPRESSION:   74 yo M w/ h/o RYGB in 2006,  h/o nephrolithiasis referred with concern for recurrent uti, also c/o erectile dysfunction    Recurrent UTI: emptying well based on bladder scanner however patient has morbid obesity with large pannus, this could be falsely low. He has bilateral lower extremity neuropathy, concerned that this is also affecting bladder. Need workup with CT urogram followed by cystoscopy. May need urodynamics as well pending results of these tests. Today has positive nitrite, will send for culture    Erectile dysfunction: multifactorial but probably related to overall health issues of HLD, HTN, morbid obesity. He is no longer sexually active with his wife but he is taking sildenafil prn when he masturbates. Discussed potentially switching to tadalafil daily instead of sildenafil prn. He knows not to take them at the same time. Discussed black box warning again coadministration with nitrates with any PDEVI    Re: nephrolithiasis, had a distal stone in 2020 but doesn't have symptoms right now. Ct urogram will assess current stone burden. High risk of stone formation given h/o RYGB      PLAN:   Send urine for culture, treat as indicated, will plan 2 week course abx  Trial  tadalafil 5 mg daily instead of sildenafil 100 mg prn  Continue tamsulosin 0.4 mg daily  Get CT urogram  Get formal bladder ultrasound for post void residual  Return for cystoscopy      Ryan Dye MD   [facility]  [phone] clinic phone

## 2023-09-14 NOTE — NURSING NOTE
"Chief Complaint   Patient presents with    Recurrent UTI     Pt states he has had 4 UTIs since January of this year.  States one of these times he'd gotten tested for UTI because he had \"blood on the front of his shorts\"   Generally does not feel any UTI symptoms.  States that due to neuropathy he has numbness from the wait down.    PVR: 25 mL by bladder scan    Shira Figueroa, EMT    "

## 2023-09-20 DIAGNOSIS — N39.0 UTI (URINARY TRACT INFECTION): Primary | ICD-10-CM

## 2023-09-20 LAB — BACTERIA UR CULT: ABNORMAL

## 2023-09-20 RX ORDER — CEFDINIR 300 MG/1
300 CAPSULE ORAL 2 TIMES DAILY
Qty: 28 CAPSULE | Refills: 0 | Status: SHIPPED | OUTPATIENT
Start: 2023-09-20 | End: 2023-10-04

## 2023-11-07 ENCOUNTER — TELEPHONE (OUTPATIENT)
Dept: UROLOGY | Facility: CLINIC | Age: 73
End: 2023-11-07
Payer: COMMERCIAL

## 2023-11-07 NOTE — TELEPHONE ENCOUNTER
----- Message from Ryan Dye MD sent at 11/7/2023  4:17 PM CST -----  Call patient and let him know that 5 mg daily tadalafil is the max dose taken daily. His alternative is to take up to 20 mg tadalafil as needed (so do not take 5 mg daily but save the tablets and take 4 at once)    ----- Message -----  From: Tuyet Capellan  Sent: 11/7/2023   2:48 PM CST  To: Ryan Dye MD    Pt is wondering if you can up his dose of cialis. It is not working as well any more. He would like it sent to [facility] in [place]

## 2023-11-08 DIAGNOSIS — N52.9 ED (ERECTILE DYSFUNCTION): Primary | ICD-10-CM

## 2023-11-08 RX ORDER — TADALAFIL 20 MG/1
20 TABLET ORAL DAILY PRN
Qty: 30 TABLET | Refills: 1 | Status: SHIPPED | OUTPATIENT
Start: 2023-11-08 | End: 2023-11-30

## 2023-11-30 DIAGNOSIS — N52.9 ED (ERECTILE DYSFUNCTION): ICD-10-CM

## 2023-11-30 RX ORDER — TADALAFIL 20 MG/1
20 TABLET ORAL DAILY PRN
Qty: 30 TABLET | Refills: 8 | Status: SHIPPED | OUTPATIENT
Start: 2023-11-30

## 2024-03-05 ENCOUNTER — HOSPITAL ENCOUNTER (OUTPATIENT)
Dept: ULTRASOUND IMAGING | Facility: CLINIC | Age: 74
Discharge: HOME | End: 2024-03-05
Attending: STUDENT IN AN ORGANIZED HEALTH CARE EDUCATION/TRAINING PROGRAM
Payer: COMMERCIAL

## 2024-03-05 ENCOUNTER — HOSPITAL ENCOUNTER (OUTPATIENT)
Dept: CT IMAGING | Facility: CLINIC | Age: 74
Discharge: HOME | End: 2024-03-05
Attending: STUDENT IN AN ORGANIZED HEALTH CARE EDUCATION/TRAINING PROGRAM
Payer: COMMERCIAL

## 2024-03-05 DIAGNOSIS — N39.0 RECURRENT UTI: ICD-10-CM

## 2024-03-05 LAB
CREAT BLD-MCNC: 1.3 MG/DL (ref 0.7–1.3)
EGFRCR SERPLBLD CKD-EPI 2021: 59 ML/MIN/1.73M2

## 2024-03-05 PROCEDURE — 250N000009 HC RX 250: Performed by: STUDENT IN AN ORGANIZED HEALTH CARE EDUCATION/TRAINING PROGRAM

## 2024-03-05 PROCEDURE — 76857 US EXAM PELVIC LIMITED: CPT

## 2024-03-05 PROCEDURE — 82565 ASSAY OF CREATININE: CPT

## 2024-03-05 PROCEDURE — 250N000011 HC RX IP 250 OP 636: Performed by: STUDENT IN AN ORGANIZED HEALTH CARE EDUCATION/TRAINING PROGRAM

## 2024-03-05 PROCEDURE — 74178 CT ABD&PLV WO CNTR FLWD CNTR: CPT

## 2024-03-05 RX ORDER — IOPAMIDOL 755 MG/ML
500 INJECTION, SOLUTION INTRAVASCULAR ONCE
Status: COMPLETED | OUTPATIENT
Start: 2024-03-05 | End: 2024-03-05

## 2024-03-05 RX ADMIN — SODIUM CHLORIDE 99 ML: 9 INJECTION, SOLUTION INTRAVENOUS at 14:06

## 2024-03-05 RX ADMIN — IOPAMIDOL 100 ML: 755 INJECTION, SOLUTION INTRAVENOUS at 14:06

## 2024-03-08 ENCOUNTER — OFFICE VISIT (OUTPATIENT)
Dept: UROLOGY | Facility: CLINIC | Age: 74
End: 2024-03-08
Payer: COMMERCIAL

## 2024-03-08 VITALS
DIASTOLIC BLOOD PRESSURE: 88 MMHG | SYSTOLIC BLOOD PRESSURE: 120 MMHG | HEIGHT: 73 IN | BODY MASS INDEX: 41.75 KG/M2 | WEIGHT: 315 LBS

## 2024-03-08 DIAGNOSIS — N52.9 ERECTILE DYSFUNCTION, UNSPECIFIED ERECTILE DYSFUNCTION TYPE: ICD-10-CM

## 2024-03-08 DIAGNOSIS — N13.8 BPH WITH URINARY OBSTRUCTION: ICD-10-CM

## 2024-03-08 DIAGNOSIS — N39.0 RECURRENT UTI: Primary | ICD-10-CM

## 2024-03-08 DIAGNOSIS — N40.1 BPH WITH URINARY OBSTRUCTION: ICD-10-CM

## 2024-03-08 LAB
ALBUMIN UR-MCNC: NEGATIVE MG/DL
APPEARANCE UR: CLEAR
BILIRUB UR QL STRIP: NEGATIVE
COLOR UR AUTO: YELLOW
GLUCOSE UR STRIP-MCNC: NEGATIVE MG/DL
HGB UR QL STRIP: NEGATIVE
KETONES UR STRIP-MCNC: NEGATIVE MG/DL
LEUKOCYTE ESTERASE UR QL STRIP: ABNORMAL
NITRATE UR QL: NEGATIVE
PH UR STRIP: 7 [PH] (ref 5–7)
SP GR UR STRIP: 1.01 (ref 1–1.03)
UROBILINOGEN UR STRIP-ACNC: 1 E.U./DL

## 2024-03-08 PROCEDURE — 99214 OFFICE O/P EST MOD 30 MIN: CPT | Mod: 25 | Performed by: STUDENT IN AN ORGANIZED HEALTH CARE EDUCATION/TRAINING PROGRAM

## 2024-03-08 PROCEDURE — 81003 URINALYSIS AUTO W/O SCOPE: CPT | Mod: QW | Performed by: STUDENT IN AN ORGANIZED HEALTH CARE EDUCATION/TRAINING PROGRAM

## 2024-03-08 PROCEDURE — 52000 CYSTOURETHROSCOPY: CPT | Performed by: STUDENT IN AN ORGANIZED HEALTH CARE EDUCATION/TRAINING PROGRAM

## 2024-03-08 RX ORDER — TAMSULOSIN HYDROCHLORIDE 0.4 MG/1
0.4 CAPSULE ORAL EVERY EVENING
Qty: 90 CAPSULE | Refills: 3 | Status: SHIPPED | OUTPATIENT
Start: 2024-03-08

## 2024-03-08 RX ORDER — VARDENAFIL HYDROCHLORIDE 20 MG/1
20 TABLET ORAL DAILY PRN
Qty: 8 TABLET | Refills: 0 | Status: SHIPPED | OUTPATIENT
Start: 2024-03-08 | End: 2024-03-25

## 2024-03-08 RX ORDER — LIDOCAINE HYDROCHLORIDE 20 MG/ML
JELLY TOPICAL ONCE
Status: COMPLETED | OUTPATIENT
Start: 2024-03-08 | End: 2024-03-08

## 2024-03-08 RX ADMIN — LIDOCAINE HYDROCHLORIDE: 20 JELLY TOPICAL at 14:56

## 2024-03-08 ASSESSMENT — PAIN SCALES - GENERAL: PAINLEVEL: NO PAIN (0)

## 2024-03-08 NOTE — PROGRESS NOTES
CHIEF COMPLAINT   Leodan Yanez who is a 73 year old male returns today for follow-up of  RYGB in 2006,  h/o nephrolithiasis referred with concern for recurrent uti, also c/o erectile dysfunction .      HPI   Leodan Yanez is a 73 year old male returns today for follow-up of  RYGB in 2006,  h/o nephrolithiasis referred with concern for recurrent uti, also c/o erectile dysfunction .      Sept 2023, treated for 2 weeks for Strep mitis infection. Last week had concern for blood in his underwear. UA showed no rbc, no wbc, no bacteria, trace leukest. Urine culture with <10 k cfu/ml multiple organisms. He was treated with empiric bactrim    URINE CULTURE  Specimen: Urine - Urine specimen (specimen)  Component 9 d ago   CULTURE <10,000 CFU/mL multiple organisms   Resulting Agency [facility]   Specimen Collected: 02/28/24  8:09 AM    Performed by: [facility] Last Resulted: 02/29/24  1:38 PM   Received From: [facility]  Result Received: 03/05/24  1:17 PM         URINALYSIS MICROSCOPIC  Specimen: Urine - Urine specimen (specimen)  Component  Ref Range & Units 9 d ago   RBC  0-2, None Seen /HPF 0-2   WBC  0-2, 3-5, None Seen /HPF 0-2   BACTERIA                  None Seen, Rare, Few Bacteria/HPF None Seen   EPITHELIAL CELLS          None Seen, Few Epi/HPF Few   Resulting Agency [facility]   Specimen Collected: 02/28/24  8:09 AM    Performed by: [facility] Last Resulted: 02/28/24  8:13 AM   Received From: [facility] [employer]  Result Received: 03/05/24  1:17 PM     UA W/ SEDIMENT EXAM REFLEXED PER CRITERIA  Specimen: Urine - Urine specimen (specimen)  Component  Ref Range & Units 9 d ago   COLOR                      Yellow Color Yellow   CLARITY                    Clear Clarity Clear   SPECIFIC GRAVITY,URINE    1.010, 1.015, 1.020, 1.025       "           1.015   PH,URINE                  6.0, 7.0, 8.0, 5.5, 6.5, 7.5, 8.5                 7.0   UROBILINOGEN,QUALITATIVE  Normal EU/dl Normal   PROTEIN, URINE  Negative mg/dL Negative   GLUCOSE, URINE  Negative mg/dL Negative   KETONES,URINE              Negative mg/dL Negative   BILIRUBIN,URINE            Negative                 Negative   OCCULT BLOOD,URINE        Negative                 Negative   NITRITE                    Negative                 Negative   LEUKOCYTE ESTERASE        Negative                 Trace Abnormal    Resulting Agency [facility]   Specimen Collected: 02/28/24  8:09 AM    Performed by: [facility] Last Resulted: 02/28/24  8:13 AM   Received From[facility]  Result Received: 03/05/24  1:17 PM       He has no idea if he is taking tamsulosin    Tadalafil does not seem to be helping with erections, gets a small amount of tumescence but no significant rigidity    PHYSICAL EXAM  Patient is a 73 year old  male   Vitals: Blood pressure 120/88, height 1.854 m (6' 1\"), weight 142.9 kg (315 lb).  Body mass index is 41.56 kg/m .  General Appearance Adult:   Alert, no acute distress, oriented  HENT: throat/mouth:normal, good dentition  Lungs: no respiratory distress, or pursed lip breathing  Heart: No obvious jugular venous distension present  Abdomen: nondistended  Musculoskeltal: extremities normal, no peripheral edema  Skin: no suspicious lesions or rashes  Neuro: Alert, oriented, speech and mentation normal  Psych: affect and mood normal  Gait: Normal  : uncirc phallus    All pertinent imaging reviewed:    Us bladder 3/5/2024  FINDINGS: Prevoid bladder volume is 211 mL. Postvoid bladder volume is  88 mL. Post void residual of 123 mL. No focal wall thickening on these  limited views.     Ct urogram 3/5/2024  IMPRESSION:   1.  Nonobstructing 2 mm stone in the lower pole of the right kidney.  No " ureterolithiasis or hydronephrosis bilaterally.  2.  No suspicious renal or upper tract urothelial lesions.        Tiny punctate right renal stone    PRE-PROCEDURE DIAGNOSIS: recurrent uti    POST-PROCEDURE DIAGNOSIS: recurrent uti    PROCEDURE: Cystoscopy     DESCRIPTION OF PROCEDURE: After informed consent was obtained, the patient was brought to the procedure room where he was placed in the supine position with all pressure points well padded.  The penis was prepped and draped in sterile fashion. A flexible cystoscope was introduced through a well-lubricated urethra.     Anterior urethra normal  Prostatic urethra short about 2 cm with bilobar moderately obstructive hypertrophy  Mild trabeculation w/o cellules  No  bladder stones  No diverticuli  Diffuse inflammatory changes of the bladder mucosa and some cystitis cystica    The flexible cystoscope was removed and the findings were described to the patient.       ASSESSMENT and PLAN  73 year old male returns today for follow-up of  RYGB in 2006,  h/o nephrolithiasis referred with concern for recurrent uti, also c/o erectile dysfunction .      Re: recurrent UTI: moderately obstructive prostate with relatively low PVR. No other concerning findings. Last week was not a UTI; I am not sure why he keeps having blood in his underwear. He is asking about circumcision; certainly this could be helpful to decrease recurrent UTI risk; however he declines at this time. I would recommend that he continue to take alpha blocker tamsulosin, unclear if he is actually on that (rx drug management)    Re: ED: he is no longer sexually active with his wife. He is not having good erections with sildenafil or tadalafil. He wants to try alternative PDEVI, will try vardenafil at max dose 20 mg prn.    Re: nephrolithiasis, no significant stone burden, only punctate right renal stone. No intervention for this.    - start (continue?) tamsulosin 0.4 mg daily)  - stop tadalafil, start vardenafil  20 mg   - return 1 year with ct abd pelvis w/o contrast, UA, PVR, AUA symptom score, or earlier if having issues      Ryan Dye MD   [facility]  [facility] Phone: [phone]

## 2024-03-08 NOTE — NURSING NOTE
Chief Complaint   Patient presents with    Recurrent UTI     Cystsocopy     Prior to the start of the procedure and with procedural staff participation, I verbally confirmed the patient s identity using two indicators, relevant allergies, that the procedure was appropriate and matched the consent or emergent situation, and that the correct equipment/implants were available. Immediately prior to starting the procedure I conducted the Time Out with the procedural staff and re-confirmed the patient s name, procedure, and site/side. I have wiped the patient off with the povidone-Iodine solution, draped them, and used Lidocaine hydrochloride jelly. (The Joint Commission universal protocol was followed.)  Yes    Sedation (Moderate or Deep): None    5mL 2% lidocaine hydrochloride Urojet instilled into urethra.    NDC# [address]-0  Lot #: [id number]  Expiration Date:  10/25    Shira Figueroa EMT

## 2024-03-08 NOTE — LETTER
3/8/2024       RE: Leodan Yanez  [address]  [facility] [address]     Dear Colleague,    Thank you for referring your patient, Leodan Yanez, to the [facility]OLOG[facility] [place] at [facility]. Please see a copy of my visit note below.    CHIEF COMPLAINT   Leodan Yanez who is a 73 year old male returns today for follow-up of  RYGB in 2006,  h/o nephrolithiasis referred with concern for recurrent uti, also c/o erectile dysfunction .      HPI   Leodan Yanez is a 73 year old male returns today for follow-up of  RYGB in 2006,  h/o nephrolithiasis referred with concern for recurrent uti, also c/o erectile dysfunction .      Sept 2023, treated for 2 weeks for Strep mitis infection. Last week had concern for blood in his underwear. UA showed no rbc, no wbc, no bacteria, trace leukest. Urine culture with <10 k cfu/ml multiple organisms. He was treated with empiric bactrim    URINE CULTURE  Specimen: Urine - Urine specimen (specimen)  Component 9 d ago   CULTURE <10,000 CFU/mL multiple organisms   Resulting Agency [facility]   Specimen Collected: 02/28/24  8:09 AM    Performed by: [facility] Last Resulted: 02/29/24  1:38 PM   Received From: [facility]  Result Received: 03/05/24  1:17 PM         URINALYSIS MICROSCOPIC  Specimen: Urine - Urine specimen (specimen)  Component  Ref Range & Units 9 d ago   RBC  0-2, None Seen /HPF 0-2   WBC  0-2, 3-5, None Seen /HPF 0-2   BACTERIA                  None Seen, Rare, Few Bacteria/HPF None Seen   EPITHELIAL CELLS          None Seen, Few Epi/HPF Few   Resulting Agency [facility]   Specimen Collected: 02/28/24  8:09 AM    Performed by: [facility] Last Resulted: 02/28/24  8:13 AM   Received From: [facility]  Result  "Received: 03/05/24  1:17 PM     UA W/ SEDIMENT EXAM REFLEXED PER CRITERIA  Specimen: Urine - Urine specimen (specimen)  Component  Ref Range & Units 9 d ago   COLOR                      Yellow Color Yellow   CLARITY                    Clear Clarity Clear   SPECIFIC GRAVITY,URINE    1.010, 1.015, 1.020, 1.025                 1.015   PH,URINE                  6.0, 7.0, 8.0, 5.5, 6.5, 7.5, 8.5                 7.0   UROBILINOGEN,QUALITATIVE  Normal EU/dl Normal   PROTEIN, URINE  Negative mg/dL Negative   GLUCOSE, URINE  Negative mg/dL Negative   KETONES,URINE              Negative mg/dL Negative   BILIRUBIN,URINE            Negative                 Negative   OCCULT BLOOD,URINE        Negative                 Negative   NITRITE                    Negative                 Negative   LEUKOCYTE ESTERASE        Negative                 Trace Abnormal    Resulting Agency [facility]   Specimen Collected: 02/28/24  8:09 AM    Performed by: [facility] Last Resulted: 02/28/24  8:13 AM   [facility]  Result Received: 03/05/24  1:17 PM       He has no idea if he is taking tamsulosin    Tadalafil does not seem to be helping with erections, gets a small amount of tumescence but no significant rigidity    PHYSICAL EXAM  Patient is a 73 year old  male   Vitals: Blood pressure 120/88, height 1.854 m (6' 1\"), weight 142.9 kg (315 lb).  Body mass index is 41.56 kg/m .  General Appearance Adult:   Alert, no acute distress, oriented  HENT: throat/mouth:normal, good dentition  Lungs: no respiratory distress, or pursed lip breathing  Heart: No obvious jugular venous distension present  Abdomen: nondistended  Musculoskeltal: extremities normal, no peripheral edema  Skin: no suspicious lesions or rashes  Neuro: Alert, oriented, speech and mentation normal  Psych: affect and mood normal  Gait: Normal  : uncirc phallus    All pertinent " imaging reviewed:    Us bladder 3/5/2024  FINDINGS: Prevoid bladder volume is 211 mL. Postvoid bladder volume is  88 mL. Post void residual of 123 mL. No focal wall thickening on these  limited views.     Ct urogram 3/5/2024  IMPRESSION:   1.  Nonobstructing 2 mm stone in the lower pole of the right kidney.  No ureterolithiasis or hydronephrosis bilaterally.  2.  No suspicious renal or upper tract urothelial lesions.        Tiny punctate right renal stone    PRE-PROCEDURE DIAGNOSIS: recurrent uti    POST-PROCEDURE DIAGNOSIS: recurrent uti    PROCEDURE: Cystoscopy     DESCRIPTION OF PROCEDURE: After informed consent was obtained, the patient was brought to the procedure room where he was placed in the supine position with all pressure points well padded.  The penis was prepped and draped in sterile fashion. A flexible cystoscope was introduced through a well-lubricated urethra.     Anterior urethra normal  Prostatic urethra short about 2 cm with bilobar moderately obstructive hypertrophy  Mild trabeculation w/o cellules  No  bladder stones  No diverticuli  Diffuse inflammatory changes of the bladder mucosa and some cystitis cystica    The flexible cystoscope was removed and the findings were described to the patient.       ASSESSMENT and PLAN  73 year old male returns today for follow-up of  RYGB in 2006,  h/o nephrolithiasis referred with concern for recurrent uti, also c/o erectile dysfunction .      Re: recurrent UTI: moderately obstructive prostate with relatively low PVR. No other concerning findings. Last week was not a UTI; I am not sure why he keeps having blood in his underwear. He is asking about circumcision; certainly this could be helpful to decrease recurrent UTI risk; however he declines at this time. I would recommend that he continue to take alpha blocker tamsulosin, unclear if he is actually on that (rx drug management)    Re: ED: he is no longer sexually active with his wife. He is not having good  erections with sildenafil or tadalafil. He wants to try alternative PDEVI, will try vardenafil at max dose 20 mg prn.    Re: nephrolithiasis, no significant stone burden, only punctate right renal stone. No intervention for this.    - start (continue?) tamsulosin 0.4 mg daily)  - stop tadalafil, start vardenafil 20 mg   - return 1 year with ct abd pelvis w/o contrast, UA, PVR, AUA symptom score, or earlier if having issues      Ryan Dye MD   [facility]rology  [facility] Phone: [phone]

## 2024-03-08 NOTE — PATIENT INSTRUCTIONS
"- start (continue?) tamsulosin 0.4 mg daily)  - stop tadalafil, start vardenafil 20 mg   - return 1 year with ct abd pelvis w/o contrast, UA, PVR, AUA symptom score, or earlier if having issues      AFTER YOUR CYSTOSCOPY  ?  ?  You have just completed a cystoscopy, or \"cysto\", which allowed your physician to learn more about your bladder (or to remove a stent placed after surgery). We suggest that you continue to avoid caffeine, fruit juice, and alcohol for the next 24 hours, however, you are encouraged to return to your normal activities.  ?  ?  A few things that are considered normal after your cystoscopy:  ?  * small amount of bleeding (or spotting) that clears within the next 24 hours  ?  * slight burning sensation with urination  ?  * sensation of needing to void (urinate) more frequently  ?  * the feeling of \"air\" in your urine  ?  * mild discomfort that is relieved with Tylenol    * bladder spasms  ?  ?  ?  Please contact our office promptly if you:  ?  * develop a fever above 101 degrees  ?  * are unable to urinate  ?  * develop bright red blood that does not stop  ?  * experience severe pain or swelling  ?  ?  ?  And of course, please contact our office with any concerns or questions [phone].  ?    "

## 2024-03-25 DIAGNOSIS — N52.9 ERECTILE DYSFUNCTION, UNSPECIFIED ERECTILE DYSFUNCTION TYPE: ICD-10-CM

## 2024-03-25 RX ORDER — VARDENAFIL HYDROCHLORIDE 20 MG/1
20 TABLET ORAL DAILY PRN
Qty: 8 TABLET | Refills: 5 | Status: SHIPPED | OUTPATIENT
Start: 2024-03-25

## 2024-09-19 ENCOUNTER — TELEPHONE (OUTPATIENT)
Dept: UROLOGY | Facility: CLINIC | Age: 74
End: 2024-09-19
Payer: COMMERCIAL

## 2024-09-19 NOTE — TELEPHONE ENCOUNTER
Left message on VM that he would need to check with the plasma donation site whether it would be safe or not.  Pura Hernandez, GIANNAN

## 2024-09-19 NOTE — TELEPHONE ENCOUNTER
[facility] Call Center    Phone Message    May a detailed message be left on voicemail: Yes    Reason for Call: Other: Patient called stating that patient starting donating plasma and was wondering if there was any concern with that and the new medication he is taking. Please call patient to follow up.     Action Taken: Other: Kim  URO    Travel Screening: Not Applicable     Date of Service:

## 2024-12-09 DIAGNOSIS — N13.8 BPH WITH URINARY OBSTRUCTION: Primary | ICD-10-CM

## 2024-12-09 DIAGNOSIS — N40.1 BPH WITH URINARY OBSTRUCTION: Primary | ICD-10-CM

## 2024-12-31 ENCOUNTER — TELEPHONE (OUTPATIENT)
Dept: UROLOGY | Facility: CLINIC | Age: 74
End: 2024-12-31
Payer: COMMERCIAL

## 2024-12-31 DIAGNOSIS — N52.9 ED (ERECTILE DYSFUNCTION): ICD-10-CM

## 2024-12-31 RX ORDER — TADALAFIL 20 MG/1
20 TABLET ORAL DAILY PRN
Qty: 30 TABLET | Refills: 1 | Status: SHIPPED | OUTPATIENT
Start: 2024-12-31

## 2024-12-31 NOTE — TELEPHONE ENCOUNTER
[facility] Call Center    Phone Message    May a detailed message be left on voicemail: yes     Reason for Call: Medication Refill Request    Has the patient contacted the pharmacy for the refill? Yes   Name of medication being requested: tadalafil (ADCIRCA/CIALIS) 20 MG tablet   Provider who prescribed the medication: Ryan Dye MD   Pharmacy:   [id number] - [place], [address] (Ph: [phone])   Date medication is needed: when able     Patient calls requesting refills. States that he hopes that he can get one more refill before needing to schedule an appointment for financial reasons. Patient requests a call back to know if prescription was filled or if he needs an appointment.    Action Taken: Message routed to:  Other: Urology    Travel Screening: Not Applicable

## 2025-05-29 ENCOUNTER — OFFICE VISIT (OUTPATIENT)
Dept: SURGERY | Facility: CLINIC | Age: 75
End: 2025-05-29
Payer: COMMERCIAL

## 2025-05-29 VITALS
HEIGHT: 73 IN | HEART RATE: 72 BPM | WEIGHT: 315 LBS | OXYGEN SATURATION: 98 % | BODY MASS INDEX: 41.75 KG/M2 | RESPIRATION RATE: 16 BRPM | SYSTOLIC BLOOD PRESSURE: 134 MMHG | DIASTOLIC BLOOD PRESSURE: 82 MMHG

## 2025-05-29 DIAGNOSIS — K42.9 UMBILICAL HERNIA WITHOUT OBSTRUCTION AND WITHOUT GANGRENE: Primary | ICD-10-CM

## 2025-05-29 ASSESSMENT — PATIENT HEALTH QUESTIONNAIRE - PHQ9: SUM OF ALL RESPONSES TO PHQ QUESTIONS 1-9: 5

## 2025-05-29 NOTE — LETTER
"May 29, 2025          RE:   Leodan Yanez 1950      Dear Colleague,    Thank you for referring your patient, Leodan Yanez, to [facility]. Please see a copy of my visit note below.    Leodan is a 74 year old male who presents for hernia evaluation.  The patient has not noticed a bulge.  Pain has not been present.  Symptoms began 1 week ago.  He noted a dime sized area of irritation in the center of his laparotomy scar; it is superficial and not surrounded by cellulitis. He does not recall traumatizing the site. The patient has had previous abdominal surgery including an open gastric bypass and a retrorectus incisional hernia repair with mesh in November 2017 (Dr Vivar).  Patient does not report that increased activity/lifting causes pain. Employment does not require lifting, he is a retired .    Constipation No  DysuriaNo  Cough No  Smoking No    Pt's chart has been reviewed for PMH, PSH, allergies, medications, social and family history.    ROS:  Pulm:  No shortness of breath, dyspnea on exertion, cough, or hemoptysis  CV:  negative  ABD:  See chief complaint  :  Negative  All other systems on 10 point review are negative.    /82   Pulse 72   Resp 16   Ht 1.854 m (6' 1\")   Wt (!) 142.9 kg (315 lb)   SpO2 98%   BMI 41.56 kg/m    Physical exam:  Patient able to get up on table without difficulty.  abdomen is soft without significant tenderness, masses, organomegaly or guarding  bowel sounds are positive and no caput medusa noted.  There is a healed but attenuated midline scar from above the umbilicus to the xiphoid process. There is a 1.5cm oval shaped area of irritation which is slightly crusted, there is no palpable foreign body beneath it. There is no recurrent bulge in the area.        Imaging  None    Assesment: skin lesion (? Impetigo or dermatitis)    Plan: I relayed that I did not detect a clinical recurrence of his hernia " nor did I think the scab area on his prior laparotomy scar represented a mesh failure or erosion. I advised him to treat the area locally with antibiotic ointment and to return to us if it worsens.        Again, thank you for allowing me to participate in the care of your patient.      Sincerely,      Zan Serrato MD

## 2025-05-29 NOTE — PROGRESS NOTES
"Leodan is a 74 year old male who presents for hernia evaluation.  The patient has not noticed a bulge.  Pain has not been present.  Symptoms began 1 week ago.  He noted a dime sized area of irritation in the center of his laparotomy scar; it is superficial and not surrounded by cellulitis. He does not recall traumatizing the site. The patient has had previous abdominal surgery including an open gastric bypass and a retrorectus incisional hernia repair with mesh in November 2017 (Dr Vivar).  Patient does not report that increased activity/lifting causes pain. Employment does not require lifting, he is a retired .    Constipation No  DysuriaNo  Cough No  Smoking No    Pt's chart has been reviewed for PMH, PSH, allergies, medications, social and family history.    ROS:  Pulm:  No shortness of breath, dyspnea on exertion, cough, or hemoptysis  CV:  negative  ABD:  See chief complaint  :  Negative  All other systems on 10 point review are negative.    /82   Pulse 72   Resp 16   Ht 1.854 m (6' 1\")   Wt (!) 142.9 kg (315 lb)   SpO2 98%   BMI 41.56 kg/m    Physical exam:  Patient able to get up on table without difficulty.  abdomen is soft without significant tenderness, masses, organomegaly or guarding  bowel sounds are positive and no caput medusa noted.  There is a healed but attenuated midline scar from above the umbilicus to the xiphoid process. There is a 1.5cm oval shaped area of irritation which is slightly crusted, there is no palpable foreign body beneath it. There is no recurrent bulge in the area.    Imaging  None    Assesment: skin lesion (? Impetigo or dermatitis)    Plan: I relayed that I did not detect a clinical recurrence of his hernia nor did I think the scab area on his prior laparotomy scar represented a mesh failure or erosion. I advised him to treat the area locally with antibiotic ointment and to return to us if it worsens.    Zan Serrato MD  5/29/2025 1:53 " PM    Please route or send letter to:  Primary Care Provider (PCP)

## 2025-06-09 ENCOUNTER — TELEPHONE (OUTPATIENT)
Dept: UROLOGY | Facility: CLINIC | Age: 75
End: 2025-06-09
Payer: COMMERCIAL

## 2025-06-17 NOTE — TELEPHONE ENCOUNTER
I sent this 9 days ago, we have tried multiple times to get him to schedule, which has not done yet. I am closing this message.  Pura Hernandez LPN

## 2025-07-22 ENCOUNTER — HOSPITAL ENCOUNTER (INPATIENT)
Facility: CLINIC | Age: 75
End: 2025-07-22
Attending: EMERGENCY MEDICINE | Admitting: INTERNAL MEDICINE
Payer: COMMERCIAL

## 2025-07-22 ENCOUNTER — APPOINTMENT (OUTPATIENT)
Dept: GENERAL RADIOLOGY | Facility: CLINIC | Age: 75
DRG: 481 | End: 2025-07-22
Attending: EMERGENCY MEDICINE
Payer: COMMERCIAL

## 2025-07-22 ENCOUNTER — APPOINTMENT (OUTPATIENT)
Dept: CT IMAGING | Facility: CLINIC | Age: 75
DRG: 481 | End: 2025-07-22
Attending: STUDENT IN AN ORGANIZED HEALTH CARE EDUCATION/TRAINING PROGRAM
Payer: COMMERCIAL

## 2025-07-22 DIAGNOSIS — S72.142B: ICD-10-CM

## 2025-07-22 DIAGNOSIS — E87.6 HYPOKALEMIA: ICD-10-CM

## 2025-07-22 DIAGNOSIS — I48.20 CHRONIC ATRIAL FIBRILLATION (H): Primary | ICD-10-CM

## 2025-07-22 LAB
ABO + RH BLD: NORMAL
ANION GAP SERPL CALCULATED.3IONS-SCNC: 10 MMOL/L (ref 7–15)
APTT PPP: 23 SECONDS (ref 22–38)
ATRIAL RATE - MUSE: NORMAL BPM
BASOPHILS # BLD AUTO: 0 10E3/UL (ref 0–0.2)
BASOPHILS NFR BLD AUTO: 0 %
BLD GP AB SCN SERPL QL: NEGATIVE
BUN SERPL-MCNC: 13.9 MG/DL (ref 8–23)
CA-I BLD-MCNC: 4.5 MG/DL (ref 4.4–5.2)
CALCIUM SERPL-MCNC: 6 MG/DL (ref 8.8–10.4)
CHLORIDE SERPL-SCNC: 114 MMOL/L (ref 98–107)
CREAT SERPL-MCNC: 0.76 MG/DL (ref 0.67–1.17)
DIASTOLIC BLOOD PRESSURE - MUSE: NORMAL MMHG
EGFRCR SERPLBLD CKD-EPI 2021: >90 ML/MIN/1.73M2
EOSINOPHIL # BLD AUTO: 0.2 10E3/UL (ref 0–0.7)
EOSINOPHIL NFR BLD AUTO: 2 %
ERYTHROCYTE [DISTWIDTH] IN BLOOD BY AUTOMATED COUNT: 14 % (ref 10–15)
GLUCOSE SERPL-MCNC: 101 MG/DL (ref 70–99)
HCO3 SERPL-SCNC: 20 MMOL/L (ref 22–29)
HCT VFR BLD AUTO: 40.3 % (ref 40–53)
HGB BLD-MCNC: 13.4 G/DL (ref 13.3–17.7)
IMM GRANULOCYTES # BLD: 0 10E3/UL
IMM GRANULOCYTES NFR BLD: 0 %
INR PPP: 1.03 (ref 0.85–1.15)
INTERPRETATION ECG - MUSE: NORMAL
LYMPHOCYTES # BLD AUTO: 1.2 10E3/UL (ref 0.8–5.3)
LYMPHOCYTES NFR BLD AUTO: 14 %
MAGNESIUM SERPL-MCNC: 1.3 MG/DL (ref 1.7–2.3)
MAGNESIUM SERPL-MCNC: 2.2 MG/DL (ref 1.7–2.3)
MCH RBC QN AUTO: 28.8 PG (ref 26.5–33)
MCHC RBC AUTO-ENTMCNC: 33.3 G/DL (ref 31.5–36.5)
MCV RBC AUTO: 87 FL (ref 78–100)
MONOCYTES # BLD AUTO: 0.5 10E3/UL (ref 0–1.3)
MONOCYTES NFR BLD AUTO: 6 %
NEUTROPHILS # BLD AUTO: 6.3 10E3/UL (ref 1.6–8.3)
NEUTROPHILS NFR BLD AUTO: 77 %
NRBC # BLD AUTO: 0 10E3/UL
NRBC BLD AUTO-RTO: 0 /100
P AXIS - MUSE: NORMAL DEGREES
PHOSPHATE SERPL-MCNC: 3 MG/DL (ref 2.5–4.5)
PLATELET # BLD AUTO: 189 10E3/UL (ref 150–450)
POTASSIUM SERPL-SCNC: 2.7 MMOL/L (ref 3.4–5.3)
POTASSIUM SERPL-SCNC: 3.7 MMOL/L (ref 3.4–5.3)
PR INTERVAL - MUSE: NORMAL MS
PROTHROMBIN TIME: 13.6 SECONDS (ref 11.8–14.8)
QRS DURATION - MUSE: 98 MS
QT - MUSE: 422 MS
QTC - MUSE: 424 MS
R AXIS - MUSE: -27 DEGREES
RBC # BLD AUTO: 4.66 10E6/UL (ref 4.4–5.9)
SODIUM SERPL-SCNC: 144 MMOL/L (ref 135–145)
SPECIMEN EXP DATE BLD: NORMAL
SYSTOLIC BLOOD PRESSURE - MUSE: NORMAL MMHG
T AXIS - MUSE: 25 DEGREES
VENTRICULAR RATE- MUSE: 61 BPM
WBC # BLD AUTO: 8.2 10E3/UL (ref 4–11)

## 2025-07-22 PROCEDURE — 5A09357 ASSISTANCE WITH RESPIRATORY VENTILATION, LESS THAN 24 CONSECUTIVE HOURS, CONTINUOUS POSITIVE AIRWAY PRESSURE: ICD-10-PCS | Performed by: INTERNAL MEDICINE

## 2025-07-22 PROCEDURE — 80048 BASIC METABOLIC PNL TOTAL CA: CPT | Performed by: EMERGENCY MEDICINE

## 2025-07-22 PROCEDURE — 96374 THER/PROPH/DIAG INJ IV PUSH: CPT

## 2025-07-22 PROCEDURE — 85025 COMPLETE CBC W/AUTO DIFF WBC: CPT | Performed by: EMERGENCY MEDICINE

## 2025-07-22 PROCEDURE — 99222 1ST HOSP IP/OBS MODERATE 55: CPT | Performed by: PHYSICIAN ASSISTANT

## 2025-07-22 PROCEDURE — 120N000001 HC R&B MED SURG/OB

## 2025-07-22 PROCEDURE — 36415 COLL VENOUS BLD VENIPUNCTURE: CPT | Performed by: PHYSICIAN ASSISTANT

## 2025-07-22 PROCEDURE — 93005 ELECTROCARDIOGRAM TRACING: CPT

## 2025-07-22 PROCEDURE — 250N000013 HC RX MED GY IP 250 OP 250 PS 637: Performed by: EMERGENCY MEDICINE

## 2025-07-22 PROCEDURE — 250N000013 HC RX MED GY IP 250 OP 250 PS 637: Performed by: PHYSICIAN ASSISTANT

## 2025-07-22 PROCEDURE — 250N000013 HC RX MED GY IP 250 OP 250 PS 637: Performed by: INTERNAL MEDICINE

## 2025-07-22 PROCEDURE — 85730 THROMBOPLASTIN TIME PARTIAL: CPT | Performed by: STUDENT IN AN ORGANIZED HEALTH CARE EDUCATION/TRAINING PROGRAM

## 2025-07-22 PROCEDURE — 86900 BLOOD TYPING SEROLOGIC ABO: CPT | Performed by: EMERGENCY MEDICINE

## 2025-07-22 PROCEDURE — 250N000011 HC RX IP 250 OP 636: Performed by: EMERGENCY MEDICINE

## 2025-07-22 PROCEDURE — 36415 COLL VENOUS BLD VENIPUNCTURE: CPT | Performed by: EMERGENCY MEDICINE

## 2025-07-22 PROCEDURE — 36415 COLL VENOUS BLD VENIPUNCTURE: CPT | Performed by: INTERNAL MEDICINE

## 2025-07-22 PROCEDURE — 99285 EMERGENCY DEPT VISIT HI MDM: CPT | Mod: 25 | Performed by: EMERGENCY MEDICINE

## 2025-07-22 PROCEDURE — 85610 PROTHROMBIN TIME: CPT | Performed by: STUDENT IN AN ORGANIZED HEALTH CARE EDUCATION/TRAINING PROGRAM

## 2025-07-22 PROCEDURE — 73700 CT LOWER EXTREMITY W/O DYE: CPT | Mod: LT

## 2025-07-22 PROCEDURE — 84100 ASSAY OF PHOSPHORUS: CPT | Performed by: PHYSICIAN ASSISTANT

## 2025-07-22 PROCEDURE — 72170 X-RAY EXAM OF PELVIS: CPT

## 2025-07-22 PROCEDURE — 82330 ASSAY OF CALCIUM: CPT | Performed by: PHYSICIAN ASSISTANT

## 2025-07-22 PROCEDURE — 73552 X-RAY EXAM OF FEMUR 2/>: CPT | Mod: LT

## 2025-07-22 PROCEDURE — 83735 ASSAY OF MAGNESIUM: CPT | Performed by: INTERNAL MEDICINE

## 2025-07-22 PROCEDURE — 84132 ASSAY OF SERUM POTASSIUM: CPT | Performed by: INTERNAL MEDICINE

## 2025-07-22 PROCEDURE — 94660 CPAP INITIATION&MGMT: CPT

## 2025-07-22 PROCEDURE — 83735 ASSAY OF MAGNESIUM: CPT | Performed by: PHYSICIAN ASSISTANT

## 2025-07-22 PROCEDURE — 36415 COLL VENOUS BLD VENIPUNCTURE: CPT | Performed by: STUDENT IN AN ORGANIZED HEALTH CARE EDUCATION/TRAINING PROGRAM

## 2025-07-22 RX ORDER — POLYETHYLENE GLYCOL 3350 17 G/17G
17 POWDER, FOR SOLUTION ORAL 2 TIMES DAILY PRN
Status: DISCONTINUED | OUTPATIENT
Start: 2025-07-22 | End: 2025-07-31 | Stop reason: HOSPADM

## 2025-07-22 RX ORDER — AMOXICILLIN 250 MG
2 CAPSULE ORAL 2 TIMES DAILY
Status: DISCONTINUED | OUTPATIENT
Start: 2025-07-22 | End: 2025-07-31 | Stop reason: HOSPADM

## 2025-07-22 RX ORDER — MAGNESIUM SULFATE HEPTAHYDRATE 40 MG/ML
2 INJECTION, SOLUTION INTRAVENOUS ONCE
Status: COMPLETED | OUTPATIENT
Start: 2025-07-22 | End: 2025-07-22

## 2025-07-22 RX ORDER — HYDROMORPHONE HCL IN WATER/PF 6 MG/30 ML
0.2 PATIENT CONTROLLED ANALGESIA SYRINGE INTRAVENOUS
Status: DISCONTINUED | OUTPATIENT
Start: 2025-07-22 | End: 2025-07-28

## 2025-07-22 RX ORDER — NALOXONE HYDROCHLORIDE 0.4 MG/ML
0.4 INJECTION, SOLUTION INTRAMUSCULAR; INTRAVENOUS; SUBCUTANEOUS
Status: DISCONTINUED | OUTPATIENT
Start: 2025-07-22 | End: 2025-07-31 | Stop reason: HOSPADM

## 2025-07-22 RX ORDER — AMLODIPINE BESYLATE 10 MG/1
10 TABLET ORAL DAILY
Status: DISCONTINUED | OUTPATIENT
Start: 2025-07-23 | End: 2025-07-27

## 2025-07-22 RX ORDER — ONDANSETRON 4 MG/1
4 TABLET, ORALLY DISINTEGRATING ORAL EVERY 6 HOURS PRN
Status: DISCONTINUED | OUTPATIENT
Start: 2025-07-22 | End: 2025-07-31 | Stop reason: HOSPADM

## 2025-07-22 RX ORDER — POTASSIUM CHLORIDE 7.45 MG/ML
10 INJECTION INTRAVENOUS ONCE
Status: COMPLETED | OUTPATIENT
Start: 2025-07-22 | End: 2025-07-22

## 2025-07-22 RX ORDER — PANTOPRAZOLE SODIUM 40 MG/1
40 TABLET, DELAYED RELEASE ORAL DAILY
Status: DISCONTINUED | OUTPATIENT
Start: 2025-07-23 | End: 2025-07-31 | Stop reason: HOSPADM

## 2025-07-22 RX ORDER — AMITRIPTYLINE HYDROCHLORIDE 75 MG/1
75 TABLET ORAL AT BEDTIME
COMMUNITY

## 2025-07-22 RX ORDER — AMITRIPTYLINE HYDROCHLORIDE 50 MG/1
50 TABLET ORAL AT BEDTIME
Status: DISCONTINUED | OUTPATIENT
Start: 2025-07-22 | End: 2025-07-22

## 2025-07-22 RX ORDER — OXYCODONE HYDROCHLORIDE 5 MG/1
5 TABLET ORAL EVERY 4 HOURS PRN
Status: DISCONTINUED | OUTPATIENT
Start: 2025-07-22 | End: 2025-07-31 | Stop reason: HOSPADM

## 2025-07-22 RX ORDER — POTASSIUM CHLORIDE 1500 MG/1
20 TABLET, EXTENDED RELEASE ORAL ONCE
Status: COMPLETED | OUTPATIENT
Start: 2025-07-22 | End: 2025-07-22

## 2025-07-22 RX ORDER — ONDANSETRON 2 MG/ML
4 INJECTION INTRAMUSCULAR; INTRAVENOUS EVERY 6 HOURS PRN
Status: DISCONTINUED | OUTPATIENT
Start: 2025-07-22 | End: 2025-07-31 | Stop reason: HOSPADM

## 2025-07-22 RX ORDER — ACETAMINOPHEN 500 MG
1000 TABLET ORAL ONCE
Status: COMPLETED | OUTPATIENT
Start: 2025-07-22 | End: 2025-07-22

## 2025-07-22 RX ORDER — NALOXONE HYDROCHLORIDE 0.4 MG/ML
0.2 INJECTION, SOLUTION INTRAMUSCULAR; INTRAVENOUS; SUBCUTANEOUS
Status: DISCONTINUED | OUTPATIENT
Start: 2025-07-22 | End: 2025-07-31 | Stop reason: HOSPADM

## 2025-07-22 RX ORDER — LIDOCAINE 40 MG/G
CREAM TOPICAL
Status: DISCONTINUED | OUTPATIENT
Start: 2025-07-22 | End: 2025-07-24

## 2025-07-22 RX ORDER — METOPROLOL SUCCINATE 100 MG/1
200 TABLET, EXTENDED RELEASE ORAL DAILY
Status: DISCONTINUED | OUTPATIENT
Start: 2025-07-23 | End: 2025-07-27

## 2025-07-22 RX ORDER — HYDROMORPHONE HYDROCHLORIDE 1 MG/ML
0.5 INJECTION, SOLUTION INTRAMUSCULAR; INTRAVENOUS; SUBCUTANEOUS ONCE
Refills: 0 | Status: COMPLETED | OUTPATIENT
Start: 2025-07-22 | End: 2025-07-22

## 2025-07-22 RX ORDER — PRAVASTATIN SODIUM 20 MG
20 TABLET ORAL AT BEDTIME
Status: DISCONTINUED | OUTPATIENT
Start: 2025-07-22 | End: 2025-07-31 | Stop reason: HOSPADM

## 2025-07-22 RX ORDER — HYDROMORPHONE HCL IN WATER/PF 6 MG/30 ML
0.4 PATIENT CONTROLLED ANALGESIA SYRINGE INTRAVENOUS
Status: DISCONTINUED | OUTPATIENT
Start: 2025-07-22 | End: 2025-07-28

## 2025-07-22 RX ORDER — ACETAMINOPHEN 500 MG
1000 TABLET ORAL EVERY 8 HOURS
Status: DISCONTINUED | OUTPATIENT
Start: 2025-07-22 | End: 2025-07-31 | Stop reason: HOSPADM

## 2025-07-22 RX ORDER — HYDROMORPHONE HYDROCHLORIDE 1 MG/ML
0.5 INJECTION, SOLUTION INTRAMUSCULAR; INTRAVENOUS; SUBCUTANEOUS EVERY 30 MIN PRN
Refills: 0 | Status: DISCONTINUED | OUTPATIENT
Start: 2025-07-22 | End: 2025-07-23

## 2025-07-22 RX ORDER — AMOXICILLIN 250 MG
1 CAPSULE ORAL 2 TIMES DAILY
Status: DISCONTINUED | OUTPATIENT
Start: 2025-07-22 | End: 2025-07-31 | Stop reason: HOSPADM

## 2025-07-22 RX ORDER — ACETAMINOPHEN 325 MG/1
650 TABLET ORAL EVERY 4 HOURS PRN
Status: DISCONTINUED | OUTPATIENT
Start: 2025-07-22 | End: 2025-07-31 | Stop reason: HOSPADM

## 2025-07-22 RX ORDER — TAMSULOSIN HYDROCHLORIDE 0.4 MG/1
0.4 CAPSULE ORAL EVERY EVENING
Status: DISCONTINUED | OUTPATIENT
Start: 2025-07-23 | End: 2025-07-31 | Stop reason: HOSPADM

## 2025-07-22 RX ADMIN — MAGNESIUM SULFATE HEPTAHYDRATE 2 G: 40 INJECTION, SOLUTION INTRAVENOUS at 18:17

## 2025-07-22 RX ADMIN — POTASSIUM CHLORIDE 10 MEQ: 7.46 INJECTION, SOLUTION INTRAVENOUS at 17:05

## 2025-07-22 RX ADMIN — POTASSIUM CHLORIDE 20 MEQ: 1500 TABLET, EXTENDED RELEASE ORAL at 22:34

## 2025-07-22 RX ADMIN — AMITRIPTYLINE HYDROCHLORIDE 75 MG: 25 TABLET, FILM COATED ORAL at 21:06

## 2025-07-22 RX ADMIN — HYDROMORPHONE HYDROCHLORIDE 0.5 MG: 1 INJECTION, SOLUTION INTRAMUSCULAR; INTRAVENOUS; SUBCUTANEOUS at 15:01

## 2025-07-22 RX ADMIN — ACETAMINOPHEN 1000 MG: 500 TABLET, FILM COATED ORAL at 21:07

## 2025-07-22 RX ADMIN — OXYCODONE HYDROCHLORIDE 2.5 MG: 5 TABLET ORAL at 21:07

## 2025-07-22 RX ADMIN — PRAVASTATIN SODIUM 20 MG: 20 TABLET ORAL at 21:07

## 2025-07-22 RX ADMIN — SENNOSIDES AND DOCUSATE SODIUM 1 TABLET: 50; 8.6 TABLET ORAL at 21:06

## 2025-07-22 RX ADMIN — ACETAMINOPHEN 1000 MG: 500 TABLET, FILM COATED ORAL at 15:03

## 2025-07-22 ASSESSMENT — ACTIVITIES OF DAILY LIVING (ADL)
ADLS_ACUITY_SCORE: 44
ADLS_ACUITY_SCORE: 56
ADLS_ACUITY_SCORE: 56
ADLS_ACUITY_SCORE: 44
ADLS_ACUITY_SCORE: 56
ADLS_ACUITY_SCORE: 44
ADLS_ACUITY_SCORE: 30
ADLS_ACUITY_SCORE: 44
ADLS_ACUITY_SCORE: 44

## 2025-07-22 ASSESSMENT — COLUMBIA-SUICIDE SEVERITY RATING SCALE - C-SSRS
2. HAVE YOU ACTUALLY HAD ANY THOUGHTS OF KILLING YOURSELF IN THE PAST MONTH?: NO
1. IN THE PAST MONTH, HAVE YOU WISHED YOU WERE DEAD OR WISHED YOU COULD GO TO SLEEP AND NOT WAKE UP?: NO
6. HAVE YOU EVER DONE ANYTHING, STARTED TO DO ANYTHING, OR PREPARED TO DO ANYTHING TO END YOUR LIFE?: NO

## 2025-07-22 NOTE — H&P
[facility]  Internal Medicine  History and Physical      Patient Name: Leodan Yanez MRN# [id number]   Age: 74 year old YOB: 1950     Date of Admission:7/22/2025    Primary care provider: Kusum Ref-Primary, Physician  Date of Service: 7/22/2025         Assessment and Plan:   Leodan Yanez is a 74 year old male with a history of Ascending Aortic Aneurysm, Glaucoma, Nephrolithiasis, Obesity, Atrial Fibrillation, MONSE, HTN, GERD, HLD, Insomnia, Neuropathy, Anxiety, BPH who presents to the ED today with left hip pain after a mechanical fall in his kitchen.       Comminuted Displaced Intertrochanteric/Subtrochanteric Fracture of the Proximal Left Femur  Imaging reveals a comminuted displaced intertrochanteric/subtrochanteric fracture of the proximal left femur. Moderate degenerative arthrosis of both hips. Degenerative changes of both SI joints and the lower lumbar spine.  - npo after midnight, IVF, antiemetics, analgesics prn  - non weight bearing to LLE  - Orthopedic Surgery consult    Hypokalemia  Hypomagnesemia  Hypocalcemia  - replace per protocol  - check ionized calcium and replace prn  - hold Hctz    Chronic Medical Problems  Chronic Atrial Fibrillation - EKG with Afib, rate 61.  On ASA 81mg daily; not on anticoagulation; pt with hx of GI bleed.  Continue Toprol XL and hold ASA for surgery.   Chronic Peripheral Neuropathy - hx of BLE neuropathy with reported poor balance at baseline.  Uses a cane.  Continue Amitriptyline  HTN - continue Amlodipine and Toprol XL.  Hold Lisinopril and Hctz for surgery.  HLD - continue Pravastatin  GERD - continue Protonix  BPH - continue Flomax  STEPHEN - continue Fluoxetine  Hx AAA - Mild ascending aorta dilatation (4.1 cm) noted on echo 2019.  MONSE - continue cpap  Obesity - hx Mirella en Y gastric bypass 2006      CODE: full  Diet/IVF: regular, npo after midnight  GI ppx:  PPI  DVT ppx: scd  Medically Ready for Discharge: Anticipated in 2-4 Days        Edith  "Tanya SOLIS PA-C  Physician Assistant   Hospitalist Service                Chief Complaint:   Fall         HPI:   74 year old male with a history of Glaucoma, Nephrolithiasis, Obesity, Atrial Fibrillation, MONSE, HTN, GERD, HLD, Insomnia, Neuropathy, Anxiety, BPH who presents to the ED today with left hip pain after a fall.     History obtained from patient, chart review and verbal discussion with the ED provider.    Patient lives with his wife and daughter in a mobile home.  He ambulates with a cane due to chronic BLE Neuropathy and poor balance at baseline and reports he has a \"tendency to fall\".  Today, he was putting something in the refrigerator he walked backward and lost his balance and fell onto his left side.  He did not hit his head or lose consciousness.  He denies any lightheadedness, chest pain or shortness of breath.  He had immediate pain in his left hip and his family called EMS.  He is on ASA, but has not taken it in the past 2-3 weeks.         Past Medical History:     Past Medical History:   Diagnosis Date    A-fib (H)     Anemia     with ulcer    Arrhythmia     Arthritis     shoulders, knees    Gastroesophageal reflux disease     Hernia of unspecified site of abdominal cavity without mention of obstruction or gangrene     History of blood transfusion     Hyperlipemia     Hypertension     Kidney stone     Neuropathy     from waist down    Retinal detachment 1993    [facility]    Sleep apnea     cpap          Past Surgical History:     Past Surgical History:   Procedure Laterality Date    rut shoulder rotator cuff repairs      CATARACT IOL, RT/LT Left 12/20/2017    explant dislocated IOL/anterior vitrectomy    COLONOSCOPY      EXCHANGE INTRAOCULAR LENS IMPLANT Right 08/14/2017    Procedure: EXCHANGE INTRAOCULAR LENS IMPLANT;  RIGHT INTRAOCULAR LENS EXPLANT WITH SECONDARY  INTRAOCULAR LENS IMPLANT WITH  IRIS  FIXATION;  Surgeon: John Lindsey MD;  Location: [facility]    EYE SURGERY      [facility] " "BYPASS OPEN  06/20/2006    Dr. John Gannon, open RYGB w/ cholecystectomy @ [facility], 6/20/2006    HERNIORRHAPHY INCISIONAL (LOCATION) N/A 11/10/2017    Procedure: HERNIORRHAPHY INCISIONAL (LOCATION);  Incisional hernia Repair with mesh and component separation;  Surgeon: Cayetano Vivar MD;  Location: [facility] OR    IMPLANT SECONDARY INTRAOCULAR LENS IMPLANT Bilateral \"20 years ago\"    per patient    ORTHOPEDIC SURGERY      REPOSITION INTRAOCULAR LENS Right 05/12/2017    Procedure: REPOSITION INTRAOCULAR LENS;;  Surgeon: John Lindsey MD;  Location: [facility]    rt knee replacement      VITRECTOMY PARSPLANA WITH 25 GAUGE SYSTEM Right 05/12/2017    Procedure: VITRECTOMY PARSPLANA WITH 25 GAUGE SYSTEM;  RIGHT EYE VITRECTOMY PARSPLANA WITH 25 GAUGE SYSTEM, ENDOLASER,  REPOSIONING OF INTRAOCULAR LENS WITH SCLERAL FIXATION;  Surgeon: Rose Weston MD;  Location: [facility]    Z LAPAROSCOPIC REPAIR OF GASTRIC ULCER W/GALILEO PATCH            Social History:     Social History     Socioeconomic History    Marital status:      Spouse name: Not on file    Number of children: Not on file    Years of education: Not on file    Highest education level: Not on file   Occupational History    Not on file   Tobacco Use    Smoking status: Never    Smokeless tobacco: Never   Substance and Sexual Activity    Alcohol use: No    Drug use: No    Sexual activity: Not on file   Other Topics Concern    Parent/sibling w/ CABG, MI or angioplasty before 65F 55M? Not Asked   Social History Narrative    Not on file     Social Drivers of Health     Financial Resource Strain: Low Risk  (5/2/2025)    Received from [employer]    Financial Resource Strain     Difficulty of Paying Living Expenses: 3     Difficulty of Paying Living Expenses: Not on file   Food Insecurity: No Food Insecurity (5/2/2025)    Received from [employer]    Food Insecurity     Do you " worry your food will run out before you are able to buy more?: 1   Transportation Needs: No Transportation Needs (5/2/2025)    Received from [employer]    Transportation Needs     Does lack of transportation keep you from medical appointments?: 1     Does lack of transportation keep you from work, meetings or getting things that you need?: 1   Physical Activity: Not on file   Stress: Not on file   Social Connections: Socially Integrated (5/2/2025)    Received from [employer]    Social Connections     Do you often feel lonely or isolated from those around you?: 0   Interpersonal Safety: Not on file   Housing Stability: Low Risk  (5/2/2025)    Received from [employer]    Housing Stability     What is your housing situation today?: 1          Family History:     Family History   Problem Relation Age of Onset    Hypertension Mother     Diabetes Father     Hypertension Father     Diabetes Brother     Hypertension Brother     Hyperlipidemia Brother     Diabetes Sister     Hypertension Sister     Hyperlipidemia Sister     Glaucoma No family hx of     Macular Degeneration No family hx of     Retinal detachment No family hx of     Amblyopia No family hx of           Allergies:    No Known Allergies       Medications:     Prior to Admission medications    Medication Sig Last Dose Taking? Auth Provider Long Term End Date   acetaminophen (TYLENOL) 325 MG tablet Take 2 tablets by mouth every 6 hours as needed. Pain.   Samuel Eckert P, DO     amitriptyline (ELAVIL) 50 MG tablet Take 50 mg by mouth At Bedtime   Reported, Patient Yes    amLODIPine (NORVASC) 10 MG tablet Take 10 mg by mouth daily   Reported, Patient Yes    amLODIPine-benazepril (LOTREL) 10-20 MG per capsule Take 1 capsule by mouth daily  Patient not taking: Reported on 5/29/2025   Reported, Patient Yes    aspirin 81 MG chewable tablet Take 81 mg by mouth daily    Reported, Patient     Celecoxib (CELEBREX PO) Take 200 mg by mouth every other day   Patient not taking: Reported on 5/29/2025   Reported, Patient Yes    FLUoxetine (PROZAC) 20 MG capsule Take 40 mg by mouth every morning.   Reported, Patient No    GABAPENTIN PO Take by mouth daily  Patient not taking: Reported on 5/29/2025   Reported, Patient Yes    lisinopril-hydrochlorothiazide (PRINZIDE/ZESTORETIC) 20-12.5 MG per tablet Take 1 tablet by mouth 2 times daily   Reported, Patient     metoprolol (TOPROL-XL) 200 MG 24 hr tablet Take 200 mg by mouth daily   Reported, Patient     NIFEDIPINE PO Take 90 mg by mouth daily  Patient not taking: Reported on 5/29/2025   Reported, Patient     ofloxacin (OCUFLOX) 0.3 % ophthalmic solution Apply 1 drop to eye 4 times daily  Patient not taking: Reported on 5/29/2025   Reported, Patient     ondansetron (ZOFRAN ODT) 4 MG ODT tab Take 1 tablet (4 mg) by mouth every 8 hours as needed for nausea or vomiting  Patient not taking: Reported on 5/29/2025   Zan Humphries MD     pantoprazole (PROTONIX) 40 MG EC tablet Take 40 mg by mouth daily   Reported, Patient     pravastatin (PRAVACHOL) 20 MG tablet Take 1 tablet by mouth At Bedtime   Reported, Patient Yes    prednisoLONE acetate (PRED FORTE) 1 % ophthalmic susp Apply 1 drop to eye 4 times daily  Patient not taking: Reported on 5/29/2025   Reported, Patient     tadalafil (ADCIRCA/CIALIS) 20 MG tablet Take 1 tablet (20 mg) by mouth daily as needed (as needed for sexual intercourse).   Ryan Dye MD Yes    tamsulosin (FLOMAX) 0.4 MG capsule Take 0.4 mg by mouth daily    Reported, Patient     vardenafil (LEVITRA) 20 MG tablet Take 1 tablet (20 mg) by mouth daily as needed (erectile dysfunction)   Charly Owusu MD Yes           Review of Systems:   A complete ROS was performed and is negative other than what is stated in the HPI.       Physical Exam:   Blood pressure 121/88, pulse 63, temperature 98.2  F (36.8  C),  temperature source Oral, resp. rate 18, SpO2 94%.  General: Alert, interactive, NAD  HEENT: AT/NC  Chest/Resp: clear to auscultation bilaterally, no crackles or wheezes  Heart/CV: irregular rate and rhythm, no murmur  Abdomen/GI: Soft, nontender, nondistended. +BS.  No rebound or guarding.  Extremities/MSK: No LE edema.  LLE shortened and externally rotated.  Skin: Warm and dry  Neuro: Alert & oriented x 3         Labs:   ROUTINE ICU LABS (Last four results)  CMP  Recent Labs   Lab 07/22/25  1500      POTASSIUM 2.7*   CHLORIDE 114*   CO2 20*   ANIONGAP 10   *   BUN 13.9   CR 0.76   GFRESTIMATED >90   KELLY 6.0*     CBC  Recent Labs   Lab 07/22/25  1500   WBC 8.2   RBC 4.66   HGB 13.4   HCT 40.3   MCV 87   MCH 28.8   MCHC 33.3   RDW 14.0             Imaging/Procedures:     Results for orders placed or performed during the hospital encounter of 07/22/25   XR Pelvis 1/2 Views    Narrative    EXAM: XR PELVIS 1/2 VIEWS, XR FEMUR LEFT 2 VIEWS  LOCATION: [facility]  DATE: 7/22/2025    INDICATION: fall, pain  COMPARISON: None.      Impression    IMPRESSION: Comminuted displaced intertrochanteric/subtrochanteric fracture of the proximal left femur. Moderate degenerative arthrosis of both hips. Degenerative changes of both SI joints and the lower lumbar spine. No additional left femur fracture.    XR Femur Left 2 Views    Narrative    EXAM: XR PELVIS 1/2 VIEWS, XR FEMUR LEFT 2 VIEWS  LOCATION: [facility]  DATE: 7/22/2025    INDICATION: fall, pain  COMPARISON: None.      Impression    IMPRESSION: Comminuted displaced intertrochanteric/subtrochanteric fracture of the proximal left femur. Moderate degenerative arthrosis of both hips. Degenerative changes of both SI joints and the lower lumbar spine. No additional left femur fracture.

## 2025-07-22 NOTE — PLAN OF CARE
Orthopedic Plan of Care     Plan for left femur intramedullary nail tomorrow a.m. provided the patient is medically optimized.     -Remain NPO after midnight  -NWB/bedrest.  -Hold PTA anticoagulation  -Type and screen ordered.  -Continue pain regimen.     Formal consult to follow.

## 2025-07-22 NOTE — PHARMACY-ADMISSION MEDICATION HISTORY
Pharmacist Admission Medication History    Admission medication history is complete. The information provided in this note is only as accurate as the sources available at the time of the update.    Information Source(s): Patient and CareEverywhere/SureScripts via in-person    Changes made to PTA medication list:  Deleted: amlodipine-benazepril, celecoxib, gabapentin, nifedipine, ofloxacin ophthalmic, ondansetron, prednisolone acetate ophthalmic, vardenafil  Changed: amitriptyline (75 mg)    Allergies reviewed with patient and updates made in EHR: yes    Medication History Completed By: Ruth David, Alesia 7/22/2025 4:35 PM    PTA Med List   Medication Sig Last Dose/Taking    acetaminophen (TYLENOL) 325 MG tablet Take 2 tablets by mouth every 6 hours as needed. Pain. Past Week    amitriptyline (ELAVIL) 50 MG tablet Take 50 mg by mouth At Bedtime 7/21/2025    amLODIPine (NORVASC) 10 MG tablet Take 10 mg by mouth daily 7/22/2025    aspirin 81 MG chewable tablet Take 81 mg by mouth daily 7/22/2025    FLUoxetine (PROZAC) 20 MG capsule Take 40 mg by mouth every morning. 7/22/2025    lisinopril-hydrochlorothiazide (PRINZIDE/ZESTORETIC) 20-12.5 MG per tablet Take 1 tablet by mouth 2 times daily 7/22/2025 Morning    metoprolol (TOPROL-XL) 200 MG 24 hr tablet Take 200 mg by mouth daily 7/22/2025    pantoprazole (PROTONIX) 40 MG EC tablet Take 40 mg by mouth daily 7/22/2025    pravastatin (PRAVACHOL) 20 MG tablet Take 1 tablet by mouth At Bedtime 7/21/2025    tadalafil (ADCIRCA/CIALIS) 20 MG tablet Take 1 tablet (20 mg) by mouth daily as needed (as needed for sexual intercourse). Past Month    tamsulosin (FLOMAX) 0.4 MG capsule Take 0.4 mg by mouth daily  7/22/2025

## 2025-07-22 NOTE — ED NOTES
[facility]  ED Nurse Handoff Report    ED Chief complaint: Fall  . ED Diagnosis:   Final diagnoses:   Displaced intertroch fx left femur, init for opn fx type I/2 (H)   Hypokalemia       Allergies: No Known Allergies    Code Status: Full Code    Activity level - Baseline/Home:  independent.  Activity Level - Current:   in bed.   Lift room needed: No.   Bariatric: No   Needed: No   Isolation: No.   Infection: Not Applicable.     Respiratory status: Room air    Vital Signs (within 30 minutes):   Vitals:    07/22/25 1440   BP: 121/88   Pulse: 63   Resp: 18   Temp: 98.2  F (36.8  C)   TempSrc: Oral   SpO2: 94%       Cardiac Rhythm:  ,      Pain level:    Patient confused: No.   Patient Falls Risk: nonskid shoes/slippers when out of bed and patient and family education.   Elimination Status: has not voided yet this shift     Patient Report - Initial Complaint: Fall.   Focused Assessment: per MD note   Presenting with left hip pain after a ground-level fall.  He has neuropathy in both of his legs accidentally tripped in his kitchen and fell down onto the left hip.  Did not hit his head, did not have a loss of consciousness, no current headache or neck pain.  No preceding chest pain shortness of breath or lightheadedness.  Takes a baby aspirin per day although has not taken it for the last 2 to 3 weeks.  History of Mirelal-en-Y gastric bypass.  No recent vomiting diarrhea melena or hematochezia.  No urinary pattern change.  Unable to weight-bear since the fall due to pain in the left hip.  Denies any numbness or tingling in the left foot currently       Abnormal Results:   Labs Ordered and Resulted from Time of ED Arrival to Time of ED Departure   BASIC METABOLIC PANEL - Abnormal       Result Value    Sodium 144      Potassium 2.7 (*)     Chloride 114 (*)     Carbon Dioxide (CO2) 20 (*)     Anion Gap 10      Urea Nitrogen 13.9      Creatinine 0.76      GFR Estimate >90      Calcium 6.0 (*)      Glucose 101 (*)    CBC WITH PLATELETS AND DIFFERENTIAL    WBC Count 8.2      RBC Count 4.66      Hemoglobin 13.4      Hematocrit 40.3      MCV 87      MCH 28.8      MCHC 33.3      RDW 14.0      Platelet Count 189      % Neutrophils 77      % Lymphocytes 14      % Monocytes 6      % Eosinophils 2      % Basophils 0      % Immature Granulocytes 0      NRBCs per 100 WBC 0      Absolute Neutrophils 6.3      Absolute Lymphocytes 1.2      Absolute Monocytes 0.5      Absolute Eosinophils 0.2      Absolute Basophils 0.0      Absolute Immature Granulocytes 0.0      Absolute NRBCs 0.0     TYPE AND SCREEN, ADULT    ABO/RH(D) O POS      Antibody Screen Negative      SPECIMEN EXPIRATION DATE 7/25/2025 11:59:00 PM CDT     ABO/RH TYPE AND SCREEN        XR Femur Left 2 Views   Final Result   IMPRESSION: Comminuted displaced intertrochanteric/subtrochanteric fracture of the proximal left femur. Moderate degenerative arthrosis of both hips. Degenerative changes of both SI joints and the lower lumbar spine. No additional left femur fracture.       XR Pelvis 1/2 Views   Final Result   IMPRESSION: Comminuted displaced intertrochanteric/subtrochanteric fracture of the proximal left femur. Moderate degenerative arthrosis of both hips. Degenerative changes of both SI joints and the lower lumbar spine. No additional left femur fracture.           Treatments provided: see MAR  Family Comments: independent  OBS brochure/video discussed/provided to patient:  N/A  ED Medications:   Medications   HYDROmorphone (PF) (DILAUDID) injection 0.5 mg (0.5 mg Intravenous $Given 7/22/25 5478)     And   HYDROmorphone (PF) (DILAUDID) injection 0.5 mg (has no administration in time range)   potassium chloride 10 mEq in 100 mL sterile water infusion (has no administration in time range)   magnesium sulfate 2 g in 50 mL sterile water intermittent infusion (has no administration in time range)   acetaminophen (TYLENOL) tablet 1,000 mg (1,000 mg Oral $Given  7/22/25 1503)       Drips infusing:  Yes  For the majority of the shift this patient was Green.   Interventions performed were see above/epic.    Sepsis treatment initiated: No    Cares/treatment/interventions/medications to be completed following ED care: surgery recs    ED Nurse Name: Lesley Toth RN  4:26 PM   RECEIVING UNIT ED HANDOFF REVIEW    Above ED Nurse Handoff Report was reviewed: Yes  Reviewed by: Jocelynn Lechuga RN on July 22, 2025 at 6:52 PM   MATT Colmenares called the ED to inform them the note was read: Yes

## 2025-07-22 NOTE — ED TRIAGE NOTES
Pt was walking in the kitchen when he tripped and fell on his left hip. He reports hearing a pop and couldn't get up.      Triage Assessment (Adult)       Row Name 07/22/25 1439 07/22/25 1438       Triage Assessment    Airway WDL WDL WDL       Respiratory WDL    Respiratory WDL WDL WDL       Skin Circulation/Temperature WDL    Skin Circulation/Temperature WDL -- WDL       Peripheral/Neurovascular WDL    Peripheral Neurovascular WDL WDL WDL       Cognitive/Neuro/Behavioral WDL    Cognitive/Neuro/Behavioral WDL WDL WDL

## 2025-07-22 NOTE — ED PROVIDER NOTES
Emergency Department Note      History of Present Illness     Chief Complaint   Fall      HPI   Leodan Yanez is a 74 year old male     Presenting with left hip pain after a ground-level fall.  He has neuropathy in both of his legs accidentally tripped in his kitchen and fell down onto the left hip.  Did not hit his head, did not have a loss of consciousness, no current headache or neck pain.  No preceding chest pain shortness of breath or lightheadedness.  Takes a baby aspirin per day although has not taken it for the last 2 to 3 weeks.  History of Mirella-en-Y gastric bypass.  No recent vomiting diarrhea melena or hematochezia.  No urinary pattern change.  Unable to weight-bear since the fall due to pain in the left hip.  Denies any numbness or tingling in the left foot currently    Independent Historian       Review of External Notes       Past Medical History     Medical History and Problem List   Past Medical History:   Diagnosis Date    A-fib (H)     Anemia     Arrhythmia     Arthritis     Gastroesophageal reflux disease     Hernia of unspecified site of abdominal cavity without mention of obstruction or gangrene     History of blood transfusion     Hyperlipemia     Hypertension     Kidney stone     Neuropathy     Retinal detachment 1993    Sleep apnea        Medications   acetaminophen (TYLENOL) 325 MG tablet  amitriptyline (ELAVIL) 50 MG tablet  amLODIPine (NORVASC) 10 MG tablet  aspirin 81 MG chewable tablet  FLUoxetine (PROZAC) 20 MG capsule  lisinopril-hydrochlorothiazide (PRINZIDE/ZESTORETIC) 20-12.5 MG per tablet  metoprolol (TOPROL-XL) 200 MG 24 hr tablet  pantoprazole (PROTONIX) 40 MG EC tablet  pravastatin (PRAVACHOL) 20 MG tablet  tadalafil (ADCIRCA/CIALIS) 20 MG tablet  tamsulosin (FLOMAX) 0.4 MG capsule        Surgical History   Past Surgical History:   Procedure Laterality Date    rut shoulder rotator cuff repairs      CATARACT IOL, RT/LT Left 12/20/2017    explant dislocated IOL/anterior  "vitrectomy    COLONOSCOPY      EXCHANGE INTRAOCULAR LENS IMPLANT Right 08/14/2017    Procedure: EXCHANGE INTRAOCULAR LENS IMPLANT;  RIGHT INTRAOCULAR LENS EXPLANT WITH SECONDARY  INTRAOCULAR LENS IMPLANT WITH  IRIS  FIXATION;  Surgeon: John Lindsey MD;  Location: [facility]    EYE SURGERY      GASTRIC BYPASS OPEN  06/20/2006    Dr. John Gannon, open RYGB w/ cholecystectomy @ [facility], 6/20/2006    HERNIORRHAPHY INCISIONAL (LOCATION) N/A 11/10/2017    Procedure: HERNIORRHAPHY INCISIONAL (LOCATION);  Incisional hernia Repair with mesh and component separation;  Surgeon: Cayetano Vivar MD;  Location: [facility] OR    IMPLANT SECONDARY INTRAOCULAR LENS IMPLANT Bilateral \"20 years ago\"    per patient    ORTHOPEDIC SURGERY      REPOSITION INTRAOCULAR LENS Right 05/12/2017    Procedure: REPOSITION INTRAOCULAR LENS;;  Surgeon: John Lindsey MD;  Location: [facility]    rt knee replacement      VITRECTOMY PARSPLANA WITH 25 GAUGE SYSTEM Right 05/12/2017    Procedure: VITRECTOMY PARSPLANA WITH 25 GAUGE SYSTEM;  RIGHT EYE VITRECTOMY PARSPLANA WITH 25 GAUGE SYSTEM, ENDOLASER,  REPOSIONING OF INTRAOCULAR LENS WITH SCLERAL FIXATION;  Surgeon: Rose Weston MD;  Location: [facility] LAPAROSCOPIC REPAIR OF GASTRIC ULCER W/GALILEO PATCH         Physical Exam     Patient Vitals for the past 24 hrs:   BP Temp Temp src Pulse Resp SpO2 Height Weight   07/22/25 1739 -- -- -- -- -- -- 1.854 m (6' 1\") (!) 146.1 kg (322 lb 1.5 oz)   07/22/25 1715 -- -- -- 64 -- -- -- --   07/22/25 1700 120/83 -- -- -- -- -- -- --   07/22/25 1515 121/85 -- -- 65 -- 92 % -- --   07/22/25 1500 110/79 -- -- 65 -- 95 % -- --   07/22/25 1446 119/80 -- -- 72 -- 98 % -- --   07/22/25 1445 119/80 -- -- 72 -- 97 % -- --   07/22/25 1440 121/88 98.2  F (36.8  C) Oral 63 18 94 % -- --       Physical Exam    HENT:  mmm, no rhinorrhea, at, nc  Eyes: periorbital tissues and sclera normal   Neck: supple, no abnormal swelling, no midline ttp, " painless ROM   Lungs:  CTAB,  no resp distress  CV: rrr, no m/r/g, ppi  Abd: soft, nontender, nondistended, no rebound/masses/guarding/hsm  Ext: Skeletal survey unremarkable to exception of left lower extremity which is shortened and externally rotated, limited to no movement at the left hip secondary to pain.  No tenderness from the mid femur through the knee lower leg foot and ankle.  PT DP pulse 2 out of 4, foot warm, able to flex and extend all 5 toes and plantarflex dorsiflex at the ankle.  Sensation intact to light touch  Skin: warm, dry, well perfused, no rashes/bruising/lesions on exposed skin  Neuro: alert, MAEE, moving all extremities without apparent deficit,  Psych: Normal mood, normal affect        Diagnostics     Lab Results   Labs Ordered and Resulted from Time of ED Arrival to Time of ED Departure   BASIC METABOLIC PANEL - Abnormal       Result Value    Sodium 144      Potassium 2.7 (*)     Chloride 114 (*)     Carbon Dioxide (CO2) 20 (*)     Anion Gap 10      Urea Nitrogen 13.9      Creatinine 0.76      GFR Estimate >90      Calcium 6.0 (*)     Glucose 101 (*)    MAGNESIUM - Abnormal    Magnesium 1.3 (*)    PHOSPHORUS - Normal    Phosphorus 3.0     CBC WITH PLATELETS AND DIFFERENTIAL    WBC Count 8.2      RBC Count 4.66      Hemoglobin 13.4      Hematocrit 40.3      MCV 87      MCH 28.8      MCHC 33.3      RDW 14.0      Platelet Count 189      % Neutrophils 77      % Lymphocytes 14      % Monocytes 6      % Eosinophils 2      % Basophils 0      % Immature Granulocytes 0      NRBCs per 100 WBC 0      Absolute Neutrophils 6.3      Absolute Lymphocytes 1.2      Absolute Monocytes 0.5      Absolute Eosinophils 0.2      Absolute Basophils 0.0      Absolute Immature Granulocytes 0.0      Absolute NRBCs 0.0     IONIZED CALCIUM   TYPE AND SCREEN, ADULT    ABO/RH(D) O POS      Antibody Screen Negative      SPECIMEN EXPIRATION DATE 7/25/2025 11:59:00 PM CDT     ABO/RH TYPE AND SCREEN       Imaging   XR Femur  Left 2 Views   Final Result   IMPRESSION: Comminuted displaced intertrochanteric/subtrochanteric fracture of the proximal left femur. Moderate degenerative arthrosis of both hips. Degenerative changes of both SI joints and the lower lumbar spine. No additional left femur fracture.       XR Pelvis 1/2 Views   Final Result   IMPRESSION: Comminuted displaced intertrochanteric/subtrochanteric fracture of the proximal left femur. Moderate degenerative arthrosis of both hips. Degenerative changes of both SI joints and the lower lumbar spine. No additional left femur fracture.       CT Hip Left w/o Contrast    (Results Pending)       EKG   ECG results from 07/22/25   EKG 12 lead     Value    Systolic Blood Pressure     Diastolic Blood Pressure     Ventricular Rate 61    Atrial Rate     PA Interval     QRS Duration 98        QTc 424    P Axis     R AXIS -27    T Axis 25    Interpretation ECG      Atrial fibrillation  Abnormal ECG  When compared with ECG of 06-Aug-2012 12:20,  Atrial fibrillation has replaced Sinus rhythm  Unconfirmed report - interpretation of this ECG is computer generated - see medical record for final interpretation  Confirmed by - EMERGENCY ROOM, PHYSICIAN ([id number]),  Izaiah Powell ([id number]) on 7/22/2025 3:03:41 PM     EKG 12 lead     Value    Systolic Blood Pressure     Diastolic Blood Pressure     Ventricular Rate 68    Atrial Rate     PA Interval     QRS Duration 92        QTc 450    P Axis     R AXIS -26    T Axis -45    Interpretation ECG      Atrial fibrillation  Low voltage QRS  Inferior infarct , age undetermined  Abnormal ECG  When compared with ECG of 22-Jul-2025 14:50,  Nonspecific T wave abnormality, worse in Inferior leads  Nonspecific T wave abnormality now evident in Anterior leads           Independent Interpretation   See ed course     ED Course      Medications Administered   Medications   HYDROmorphone (PF) (DILAUDID) injection 0.5 mg (0.5 mg Intravenous $Given 7/22/25  1501)     And   HYDROmorphone (PF) (DILAUDID) injection 0.5 mg (has no administration in time range)   potassium chloride 10 mEq in 100 mL sterile water infusion (10 mEq Intravenous $New Bag 7/22/25 3272)   magnesium sulfate 2 g in 50 mL sterile water intermittent infusion (has no administration in time range)   FLUoxetine (PROzac) capsule 40 mg (has no administration in time range)   amLODIPine (NORVASC) tablet 10 mg (has no administration in time range)   amitriptyline (ELAVIL) tablet 50 mg (has no administration in time range)   metoprolol succinate ER (TOPROL XL) 24 hr tablet 200 mg (has no administration in time range)   pantoprazole (PROTONIX) EC tablet 40 mg (has no administration in time range)   pravastatin (PRAVACHOL) tablet 20 mg (has no administration in time range)   tamsulosin (FLOMAX) capsule 0.4 mg (has no administration in time range)   lidocaine 1 % 0.1-1 mL (has no administration in time range)   lidocaine (LMX4) cream (has no administration in time range)   sodium chloride (PF) 0.9% PF flush 3 mL (has no administration in time range)   sodium chloride (PF) 0.9% PF flush 3 mL (has no administration in time range)   acetaminophen (TYLENOL) tablet 650 mg (has no administration in time range)   oxyCODONE IR (ROXICODONE) half-tab 2.5 mg (has no administration in time range)   oxyCODONE (ROXICODONE) tablet 5 mg (has no administration in time range)   HYDROmorphone (DILAUDID) injection 0.2 mg (has no administration in time range)   HYDROmorphone (DILAUDID) injection 0.4 mg (has no administration in time range)   senna-docusate (SENOKOT-S/PERICOLACE) 8.6-50 MG per tablet 1 tablet (has no administration in time range)     Or   senna-docusate (SENOKOT-S/PERICOLACE) 8.6-50 MG per tablet 2 tablet (has no administration in time range)   polyethylene glycol (MIRALAX) Packet 17 g (has no administration in time range)   ondansetron (ZOFRAN ODT) ODT tab 4 mg (has no administration in time range)     Or    ondansetron (ZOFRAN) injection 4 mg (has no administration in time range)   acetaminophen (TYLENOL) tablet 1,000 mg (has no administration in time range)   acetaminophen (TYLENOL) tablet 1,000 mg (1,000 mg Oral $Given 7/22/25 1502)       Procedures   Procedures     Discussion of Management   Orthopedics, plan for surgery tomorrow.    Admitting hospitalist    ED Course   ED Course as of 07/22/25 1758 Tue Jul 22, 2025 1758 My review of the radiograph shows a left inner troches/subtrochanteric fracture.       Additional Documentation      Medical Decision Making / Diagnosis     CMS Diagnoses:         [facility]   Leodan Yanez is a 74 year old male     Ground-level fall, left intertrochanteric fracture.  ECG done as part of preop workup shows atrial fibrillation (I see documentation when I searched epic with atrial fibrillation that he has been diagnosed with chronic A-fib in the past).  Rate controlled.  Hemodynamics otherwise stable.  Hypokalemia and so given supplemental potassium and magnesium.        Disposition   The patient was admitted to the hospital.     Diagnosis     ICD-10-CM    1. Displaced intertroch fx left femur, init for opn fx type I/2 (H)  S72.142B Case Request: OPEN REDUCTION INTERNAL FIXATION, FRACTURE, FEMUR, USING INTRAMEDULLARY HERB     Case Request: OPEN REDUCTION INTERNAL FIXATION, FRACTURE, FEMUR, USING INTRAMEDULLARY HERB      2. Hypokalemia  E87.6            Discharge Medications   New Prescriptions    No medications on file       MD Adrian Millan Jerome Richard, MD  07/22/25 1800       Thomas Campbell MD  07/22/25 1802

## 2025-07-23 ENCOUNTER — APPOINTMENT (OUTPATIENT)
Dept: GENERAL RADIOLOGY | Facility: CLINIC | Age: 75
DRG: 481 | End: 2025-07-23
Attending: STUDENT IN AN ORGANIZED HEALTH CARE EDUCATION/TRAINING PROGRAM
Payer: COMMERCIAL

## 2025-07-23 ENCOUNTER — ANESTHESIA EVENT (OUTPATIENT)
Dept: SURGERY | Facility: CLINIC | Age: 75
End: 2025-07-23
Payer: COMMERCIAL

## 2025-07-23 ENCOUNTER — ANESTHESIA (OUTPATIENT)
Dept: SURGERY | Facility: CLINIC | Age: 75
End: 2025-07-23
Payer: COMMERCIAL

## 2025-07-23 LAB
ANION GAP SERPL CALCULATED.3IONS-SCNC: 11 MMOL/L (ref 7–15)
ATRIAL RATE - MUSE: NORMAL BPM
BUN SERPL-MCNC: 18.2 MG/DL (ref 8–23)
CALCIUM SERPL-MCNC: 8.7 MG/DL (ref 8.8–10.4)
CHLORIDE SERPL-SCNC: 100 MMOL/L (ref 98–107)
CREAT SERPL-MCNC: 0.95 MG/DL (ref 0.67–1.17)
DIASTOLIC BLOOD PRESSURE - MUSE: NORMAL MMHG
EGFRCR SERPLBLD CKD-EPI 2021: 85 ML/MIN/1.73M2
GLUCOSE BLDC GLUCOMTR-MCNC: 96 MG/DL (ref 70–99)
GLUCOSE SERPL-MCNC: 133 MG/DL (ref 70–99)
HCO3 SERPL-SCNC: 27 MMOL/L (ref 22–29)
HOLD SPECIMEN: NORMAL
INTERPRETATION ECG - MUSE: NORMAL
MAGNESIUM SERPL-MCNC: 2.2 MG/DL (ref 1.7–2.3)
P AXIS - MUSE: NORMAL DEGREES
PHOSPHATE SERPL-MCNC: 3.7 MG/DL (ref 2.5–4.5)
POTASSIUM SERPL-SCNC: 4 MMOL/L (ref 3.4–5.3)
PR INTERVAL - MUSE: NORMAL MS
QRS DURATION - MUSE: 92 MS
QT - MUSE: 424 MS
QTC - MUSE: 450 MS
R AXIS - MUSE: -26 DEGREES
SODIUM SERPL-SCNC: 138 MMOL/L (ref 135–145)
SYSTOLIC BLOOD PRESSURE - MUSE: NORMAL MMHG
T AXIS - MUSE: -45 DEGREES
VENTRICULAR RATE- MUSE: 68 BPM

## 2025-07-23 PROCEDURE — 999N000179 XR SURGERY CARM FLUORO LESS THAN 5 MIN W STILLS

## 2025-07-23 PROCEDURE — 94660 CPAP INITIATION&MGMT: CPT

## 2025-07-23 PROCEDURE — 250N000011 HC RX IP 250 OP 636: Performed by: STUDENT IN AN ORGANIZED HEALTH CARE EDUCATION/TRAINING PROGRAM

## 2025-07-23 PROCEDURE — C1713 ANCHOR/SCREW BN/BN,TIS/BN: HCPCS | Performed by: STUDENT IN AN ORGANIZED HEALTH CARE EDUCATION/TRAINING PROGRAM

## 2025-07-23 PROCEDURE — 250N000009 HC RX 250: Performed by: STUDENT IN AN ORGANIZED HEALTH CARE EDUCATION/TRAINING PROGRAM

## 2025-07-23 PROCEDURE — 99232 SBSQ HOSP IP/OBS MODERATE 35: CPT | Performed by: STUDENT IN AN ORGANIZED HEALTH CARE EDUCATION/TRAINING PROGRAM

## 2025-07-23 PROCEDURE — 83735 ASSAY OF MAGNESIUM: CPT | Performed by: STUDENT IN AN ORGANIZED HEALTH CARE EDUCATION/TRAINING PROGRAM

## 2025-07-23 PROCEDURE — 258N000003 HC RX IP 258 OP 636: Performed by: NURSE ANESTHETIST, CERTIFIED REGISTERED

## 2025-07-23 PROCEDURE — 250N000013 HC RX MED GY IP 250 OP 250 PS 637: Performed by: PHYSICIAN ASSISTANT

## 2025-07-23 PROCEDURE — 36415 COLL VENOUS BLD VENIPUNCTURE: CPT | Performed by: STUDENT IN AN ORGANIZED HEALTH CARE EDUCATION/TRAINING PROGRAM

## 2025-07-23 PROCEDURE — 120N000001 HC R&B MED SURG/OB

## 2025-07-23 PROCEDURE — 0QS736Z REPOSITION LEFT UPPER FEMUR WITH INTRAMEDULLARY INTERNAL FIXATION DEVICE, PERCUTANEOUS APPROACH: ICD-10-PCS | Performed by: STUDENT IN AN ORGANIZED HEALTH CARE EDUCATION/TRAINING PROGRAM

## 2025-07-23 PROCEDURE — 250N000013 HC RX MED GY IP 250 OP 250 PS 637: Performed by: INTERNAL MEDICINE

## 2025-07-23 PROCEDURE — 84100 ASSAY OF PHOSPHORUS: CPT | Performed by: STUDENT IN AN ORGANIZED HEALTH CARE EDUCATION/TRAINING PROGRAM

## 2025-07-23 PROCEDURE — 250N000013 HC RX MED GY IP 250 OP 250 PS 637: Performed by: STUDENT IN AN ORGANIZED HEALTH CARE EDUCATION/TRAINING PROGRAM

## 2025-07-23 PROCEDURE — 250N000009 HC RX 250: Performed by: NURSE ANESTHETIST, CERTIFIED REGISTERED

## 2025-07-23 PROCEDURE — 370N000017 HC ANESTHESIA TECHNICAL FEE, PER MIN: Performed by: STUDENT IN AN ORGANIZED HEALTH CARE EDUCATION/TRAINING PROGRAM

## 2025-07-23 PROCEDURE — 999N000141 HC STATISTIC PRE-PROCEDURE NURSING ASSESSMENT: Performed by: STUDENT IN AN ORGANIZED HEALTH CARE EDUCATION/TRAINING PROGRAM

## 2025-07-23 PROCEDURE — 250N000011 HC RX IP 250 OP 636: Performed by: PHYSICIAN ASSISTANT

## 2025-07-23 PROCEDURE — 360N000083 HC SURGERY LEVEL 3 W/ FLUORO, PER MIN: Performed by: STUDENT IN AN ORGANIZED HEALTH CARE EDUCATION/TRAINING PROGRAM

## 2025-07-23 PROCEDURE — 80048 BASIC METABOLIC PNL TOTAL CA: CPT | Performed by: STUDENT IN AN ORGANIZED HEALTH CARE EDUCATION/TRAINING PROGRAM

## 2025-07-23 PROCEDURE — 250N000011 HC RX IP 250 OP 636: Performed by: NURSE ANESTHETIST, CERTIFIED REGISTERED

## 2025-07-23 PROCEDURE — 73552 X-RAY EXAM OF FEMUR 2/>: CPT | Mod: LT

## 2025-07-23 PROCEDURE — 999N000157 HC STATISTIC RCP TIME EA 10 MIN

## 2025-07-23 PROCEDURE — 250N000025 HC SEVOFLURANE, PER MIN: Performed by: STUDENT IN AN ORGANIZED HEALTH CARE EDUCATION/TRAINING PROGRAM

## 2025-07-23 PROCEDURE — 710N000010 HC RECOVERY PHASE 1, LEVEL 2, PER MIN: Performed by: STUDENT IN AN ORGANIZED HEALTH CARE EDUCATION/TRAINING PROGRAM

## 2025-07-23 PROCEDURE — 258N000003 HC RX IP 258 OP 636: Performed by: ANESTHESIOLOGY

## 2025-07-23 PROCEDURE — 272N000001 HC OR GENERAL SUPPLY STERILE: Performed by: STUDENT IN AN ORGANIZED HEALTH CARE EDUCATION/TRAINING PROGRAM

## 2025-07-23 PROCEDURE — 272N000002 HC OR SUPPLY OTHER OPNP: Performed by: STUDENT IN AN ORGANIZED HEALTH CARE EDUCATION/TRAINING PROGRAM

## 2025-07-23 DEVICE — IMPLANTABLE DEVICE: Type: IMPLANTABLE DEVICE | Site: FEMUR | Status: FUNCTIONAL

## 2025-07-23 DEVICE — PIN FIXATION GAMMA 3.2/3.9MM 4MM 450MM TEMP TAPER 1420-0065S: Type: IMPLANTABLE DEVICE | Site: LEG | Status: FUNCTIONAL

## 2025-07-23 DEVICE — IMP WIRE KIRSCHNER 3.2X450MM 1210-6450S: Type: IMPLANTABLE DEVICE | Site: LEG | Status: FUNCTIONAL

## 2025-07-23 DEVICE — IMPLANTABLE DEVICE: Type: IMPLANTABLE DEVICE | Site: LEG | Status: FUNCTIONAL

## 2025-07-23 RX ORDER — ALBUTEROL SULFATE 0.83 MG/ML
2.5 SOLUTION RESPIRATORY (INHALATION) EVERY 4 HOURS PRN
Status: DISCONTINUED | OUTPATIENT
Start: 2025-07-23 | End: 2025-07-23 | Stop reason: HOSPADM

## 2025-07-23 RX ORDER — FENTANYL CITRATE 50 UG/ML
50 INJECTION, SOLUTION INTRAMUSCULAR; INTRAVENOUS EVERY 5 MIN PRN
Status: DISCONTINUED | OUTPATIENT
Start: 2025-07-23 | End: 2025-07-23 | Stop reason: HOSPADM

## 2025-07-23 RX ORDER — BUPIVACAINE HYDROCHLORIDE 5 MG/ML
INJECTION, SOLUTION EPIDURAL; INTRACAUDAL; PERINEURAL PRN
Status: DISCONTINUED | OUTPATIENT
Start: 2025-07-23 | End: 2025-07-23 | Stop reason: HOSPADM

## 2025-07-23 RX ORDER — ONDANSETRON 2 MG/ML
INJECTION INTRAMUSCULAR; INTRAVENOUS PRN
Status: DISCONTINUED | OUTPATIENT
Start: 2025-07-23 | End: 2025-07-23

## 2025-07-23 RX ORDER — SODIUM CHLORIDE, SODIUM LACTATE, POTASSIUM CHLORIDE, CALCIUM CHLORIDE 600; 310; 30; 20 MG/100ML; MG/100ML; MG/100ML; MG/100ML
INJECTION, SOLUTION INTRAVENOUS CONTINUOUS
Status: DISCONTINUED | OUTPATIENT
Start: 2025-07-23 | End: 2025-07-25

## 2025-07-23 RX ORDER — DEXAMETHASONE SODIUM PHOSPHATE 4 MG/ML
INJECTION, SOLUTION INTRA-ARTICULAR; INTRALESIONAL; INTRAMUSCULAR; INTRAVENOUS; SOFT TISSUE PRN
Status: DISCONTINUED | OUTPATIENT
Start: 2025-07-23 | End: 2025-07-23

## 2025-07-23 RX ORDER — CEFAZOLIN SODIUM/WATER 3 G/30 ML
3 SYRINGE (ML) INTRAVENOUS
Status: COMPLETED | OUTPATIENT
Start: 2025-07-23 | End: 2025-07-23

## 2025-07-23 RX ORDER — ONDANSETRON 4 MG/1
4 TABLET, ORALLY DISINTEGRATING ORAL EVERY 30 MIN PRN
Status: DISCONTINUED | OUTPATIENT
Start: 2025-07-23 | End: 2025-07-23 | Stop reason: HOSPADM

## 2025-07-23 RX ORDER — LIDOCAINE HYDROCHLORIDE 20 MG/ML
INJECTION, SOLUTION INFILTRATION; PERINEURAL PRN
Status: DISCONTINUED | OUTPATIENT
Start: 2025-07-23 | End: 2025-07-23

## 2025-07-23 RX ORDER — CEFAZOLIN SODIUM/WATER 3 G/30 ML
3 SYRINGE (ML) INTRAVENOUS SEE ADMIN INSTRUCTIONS
Status: DISCONTINUED | OUTPATIENT
Start: 2025-07-23 | End: 2025-07-23

## 2025-07-23 RX ORDER — MAGNESIUM HYDROXIDE 1200 MG/15ML
LIQUID ORAL PRN
Status: DISCONTINUED | OUTPATIENT
Start: 2025-07-23 | End: 2025-07-23 | Stop reason: HOSPADM

## 2025-07-23 RX ORDER — PROPOFOL 10 MG/ML
INJECTION, EMULSION INTRAVENOUS PRN
Status: DISCONTINUED | OUTPATIENT
Start: 2025-07-23 | End: 2025-07-23

## 2025-07-23 RX ORDER — MEPERIDINE HYDROCHLORIDE 25 MG/ML
12.5 INJECTION INTRAMUSCULAR; INTRAVENOUS; SUBCUTANEOUS EVERY 5 MIN PRN
Status: DISCONTINUED | OUTPATIENT
Start: 2025-07-23 | End: 2025-07-23 | Stop reason: HOSPADM

## 2025-07-23 RX ORDER — HYDROMORPHONE HCL IN WATER/PF 6 MG/30 ML
0.4 PATIENT CONTROLLED ANALGESIA SYRINGE INTRAVENOUS EVERY 5 MIN PRN
Status: DISCONTINUED | OUTPATIENT
Start: 2025-07-23 | End: 2025-07-23 | Stop reason: HOSPADM

## 2025-07-23 RX ORDER — SODIUM CHLORIDE, SODIUM LACTATE, POTASSIUM CHLORIDE, CALCIUM CHLORIDE 600; 310; 30; 20 MG/100ML; MG/100ML; MG/100ML; MG/100ML
INJECTION, SOLUTION INTRAVENOUS CONTINUOUS
Status: DISCONTINUED | OUTPATIENT
Start: 2025-07-23 | End: 2025-07-23 | Stop reason: HOSPADM

## 2025-07-23 RX ORDER — EPHEDRINE SULFATE 50 MG/ML
INJECTION, SOLUTION INTRAMUSCULAR; INTRAVENOUS; SUBCUTANEOUS PRN
Status: DISCONTINUED | OUTPATIENT
Start: 2025-07-23 | End: 2025-07-23

## 2025-07-23 RX ORDER — FENTANYL CITRATE 50 UG/ML
INJECTION, SOLUTION INTRAMUSCULAR; INTRAVENOUS PRN
Status: DISCONTINUED | OUTPATIENT
Start: 2025-07-23 | End: 2025-07-23

## 2025-07-23 RX ORDER — SODIUM CHLORIDE, SODIUM LACTATE, POTASSIUM CHLORIDE, CALCIUM CHLORIDE 600; 310; 30; 20 MG/100ML; MG/100ML; MG/100ML; MG/100ML
INJECTION, SOLUTION INTRAVENOUS CONTINUOUS PRN
Status: DISCONTINUED | OUTPATIENT
Start: 2025-07-23 | End: 2025-07-23

## 2025-07-23 RX ORDER — METHOCARBAMOL 500 MG/1
500 TABLET, FILM COATED ORAL 4 TIMES DAILY PRN
Status: DISCONTINUED | OUTPATIENT
Start: 2025-07-23 | End: 2025-07-31 | Stop reason: HOSPADM

## 2025-07-23 RX ORDER — TRANEXAMIC ACID 10 MG/ML
1 INJECTION, SOLUTION INTRAVENOUS ONCE
Status: DISCONTINUED | OUTPATIENT
Start: 2025-07-23 | End: 2025-07-25

## 2025-07-23 RX ORDER — LIDOCAINE 40 MG/G
CREAM TOPICAL
Status: DISCONTINUED | OUTPATIENT
Start: 2025-07-23 | End: 2025-07-31 | Stop reason: HOSPADM

## 2025-07-23 RX ORDER — TRANEXAMIC ACID 10 MG/ML
1 INJECTION, SOLUTION INTRAVENOUS ONCE
Status: COMPLETED | OUTPATIENT
Start: 2025-07-23 | End: 2025-07-23

## 2025-07-23 RX ORDER — ONDANSETRON 2 MG/ML
4 INJECTION INTRAMUSCULAR; INTRAVENOUS EVERY 30 MIN PRN
Status: DISCONTINUED | OUTPATIENT
Start: 2025-07-23 | End: 2025-07-23 | Stop reason: HOSPADM

## 2025-07-23 RX ORDER — DEXAMETHASONE SODIUM PHOSPHATE 4 MG/ML
4 INJECTION, SOLUTION INTRA-ARTICULAR; INTRALESIONAL; INTRAMUSCULAR; INTRAVENOUS; SOFT TISSUE
Status: DISCONTINUED | OUTPATIENT
Start: 2025-07-23 | End: 2025-07-23 | Stop reason: HOSPADM

## 2025-07-23 RX ORDER — CEFAZOLIN SODIUM 2 G/50ML
2 SOLUTION INTRAVENOUS EVERY 8 HOURS
Status: COMPLETED | OUTPATIENT
Start: 2025-07-23 | End: 2025-07-24

## 2025-07-23 RX ORDER — LABETALOL HYDROCHLORIDE 5 MG/ML
10 INJECTION, SOLUTION INTRAVENOUS EVERY 10 MIN PRN
Status: DISCONTINUED | OUTPATIENT
Start: 2025-07-23 | End: 2025-07-23 | Stop reason: HOSPADM

## 2025-07-23 RX ADMIN — ROCURONIUM BROMIDE 20 MG: 50 INJECTION, SOLUTION INTRAVENOUS at 10:00

## 2025-07-23 RX ADMIN — SUGAMMADEX 200 MG: 100 INJECTION, SOLUTION INTRAVENOUS at 10:39

## 2025-07-23 RX ADMIN — ACETAMINOPHEN 1000 MG: 500 TABLET, FILM COATED ORAL at 05:19

## 2025-07-23 RX ADMIN — FENTANYL CITRATE 50 MCG: 50 INJECTION INTRAMUSCULAR; INTRAVENOUS at 07:43

## 2025-07-23 RX ADMIN — PHENYLEPHRINE HYDROCHLORIDE 0.3 MCG/KG/MIN: 10 INJECTION INTRAVENOUS at 09:11

## 2025-07-23 RX ADMIN — PHENYLEPHRINE HYDROCHLORIDE 100 MCG: 10 INJECTION INTRAVENOUS at 08:15

## 2025-07-23 RX ADMIN — EPHEDRINE SULFATE 10 MG: 5 INJECTION INTRAVENOUS at 08:35

## 2025-07-23 RX ADMIN — CEFAZOLIN SODIUM 2 G: 2 SOLUTION INTRAVENOUS at 14:23

## 2025-07-23 RX ADMIN — PHENYLEPHRINE HYDROCHLORIDE 0.3 MCG/KG/MIN: 10 INJECTION INTRAVENOUS at 09:33

## 2025-07-23 RX ADMIN — EPHEDRINE SULFATE 5 MG: 5 INJECTION INTRAVENOUS at 08:33

## 2025-07-23 RX ADMIN — TRANEXAMIC ACID 1 G: 10 INJECTION, SOLUTION INTRAVENOUS at 07:39

## 2025-07-23 RX ADMIN — NOREPINEPHRINE BITARTRATE 6.4 MCG: 1 INJECTION, SOLUTION, CONCENTRATE INTRAVENOUS at 08:23

## 2025-07-23 RX ADMIN — EPHEDRINE SULFATE 5 MG: 5 INJECTION INTRAVENOUS at 09:11

## 2025-07-23 RX ADMIN — ACETAMINOPHEN 1000 MG: 500 TABLET, FILM COATED ORAL at 21:10

## 2025-07-23 RX ADMIN — PRAVASTATIN SODIUM 20 MG: 20 TABLET ORAL at 20:00

## 2025-07-23 RX ADMIN — TRANEXAMIC ACID 1 G: 10 INJECTION, SOLUTION INTRAVENOUS at 10:19

## 2025-07-23 RX ADMIN — ACETAMINOPHEN 1000 MG: 500 TABLET, FILM COATED ORAL at 14:23

## 2025-07-23 RX ADMIN — NOREPINEPHRINE BITARTRATE 6.4 MCG: 1 INJECTION, SOLUTION, CONCENTRATE INTRAVENOUS at 09:11

## 2025-07-23 RX ADMIN — PHENYLEPHRINE HYDROCHLORIDE 100 MCG: 10 INJECTION INTRAVENOUS at 08:08

## 2025-07-23 RX ADMIN — PHENYLEPHRINE HYDROCHLORIDE 0.5 MCG/KG/MIN: 10 INJECTION INTRAVENOUS at 08:48

## 2025-07-23 RX ADMIN — ONDANSETRON 4 MG: 2 INJECTION INTRAMUSCULAR; INTRAVENOUS at 10:17

## 2025-07-23 RX ADMIN — HYDROMORPHONE HYDROCHLORIDE 0.4 MG: 0.2 INJECTION, SOLUTION INTRAMUSCULAR; INTRAVENOUS; SUBCUTANEOUS at 05:19

## 2025-07-23 RX ADMIN — PROPOFOL 200 MG: 10 INJECTION, EMULSION INTRAVENOUS at 07:43

## 2025-07-23 RX ADMIN — CEFAZOLIN SODIUM 2 G: 2 SOLUTION INTRAVENOUS at 21:10

## 2025-07-23 RX ADMIN — NOREPINEPHRINE BITARTRATE 6.4 MCG: 1 INJECTION, SOLUTION, CONCENTRATE INTRAVENOUS at 08:30

## 2025-07-23 RX ADMIN — TAMSULOSIN HYDROCHLORIDE 0.4 MG: 0.4 CAPSULE ORAL at 19:57

## 2025-07-23 RX ADMIN — NOREPINEPHRINE BITARTRATE 6.4 MCG: 1 INJECTION, SOLUTION, CONCENTRATE INTRAVENOUS at 08:25

## 2025-07-23 RX ADMIN — SODIUM CHLORIDE, SODIUM LACTATE, POTASSIUM CHLORIDE, AND CALCIUM CHLORIDE: .6; .31; .03; .02 INJECTION, SOLUTION INTRAVENOUS at 06:59

## 2025-07-23 RX ADMIN — PHENYLEPHRINE HYDROCHLORIDE 0.3 MCG/KG/MIN: 10 INJECTION INTRAVENOUS at 08:27

## 2025-07-23 RX ADMIN — SODIUM CHLORIDE, SODIUM LACTATE, POTASSIUM CHLORIDE, AND CALCIUM CHLORIDE: .6; .31; .03; .02 INJECTION, SOLUTION INTRAVENOUS at 09:08

## 2025-07-23 RX ADMIN — ROCURONIUM BROMIDE 50 MG: 50 INJECTION, SOLUTION INTRAVENOUS at 07:44

## 2025-07-23 RX ADMIN — NOREPINEPHRINE BITARTRATE 6.4 MCG: 1 INJECTION, SOLUTION, CONCENTRATE INTRAVENOUS at 08:17

## 2025-07-23 RX ADMIN — ROCURONIUM BROMIDE 20 MG: 50 INJECTION, SOLUTION INTRAVENOUS at 08:17

## 2025-07-23 RX ADMIN — PHENYLEPHRINE HYDROCHLORIDE 0.5 MCG/KG/MIN: 10 INJECTION INTRAVENOUS at 08:40

## 2025-07-23 RX ADMIN — METHOCARBAMOL 500 MG: 500 TABLET ORAL at 21:11

## 2025-07-23 RX ADMIN — FENTANYL CITRATE 50 MCG: 50 INJECTION INTRAMUSCULAR; INTRAVENOUS at 10:38

## 2025-07-23 RX ADMIN — LIDOCAINE HYDROCHLORIDE 50 MG: 20 INJECTION, SOLUTION INFILTRATION; PERINEURAL at 07:43

## 2025-07-23 RX ADMIN — AMITRIPTYLINE HYDROCHLORIDE 75 MG: 25 TABLET, FILM COATED ORAL at 20:00

## 2025-07-23 RX ADMIN — ROCURONIUM BROMIDE 20 MG: 50 INJECTION, SOLUTION INTRAVENOUS at 09:04

## 2025-07-23 RX ADMIN — NOREPINEPHRINE BITARTRATE 6.4 MCG: 1 INJECTION, SOLUTION, CONCENTRATE INTRAVENOUS at 08:35

## 2025-07-23 RX ADMIN — DEXAMETHASONE SODIUM PHOSPHATE 8 MG: 4 INJECTION, SOLUTION INTRA-ARTICULAR; INTRALESIONAL; INTRAMUSCULAR; INTRAVENOUS; SOFT TISSUE at 07:44

## 2025-07-23 RX ADMIN — EPHEDRINE SULFATE 5 MG: 5 INJECTION INTRAVENOUS at 09:01

## 2025-07-23 RX ADMIN — Medication 3 G: at 07:39

## 2025-07-23 RX ADMIN — SODIUM CHLORIDE, SODIUM LACTATE, POTASSIUM CHLORIDE, AND CALCIUM CHLORIDE: .6; .31; .03; .02 INJECTION, SOLUTION INTRAVENOUS at 06:04

## 2025-07-23 RX ADMIN — PHENYLEPHRINE HYDROCHLORIDE 100 MCG: 10 INJECTION INTRAVENOUS at 08:11

## 2025-07-23 RX ADMIN — FENTANYL CITRATE 50 MCG: 50 INJECTION INTRAMUSCULAR; INTRAVENOUS at 08:20

## 2025-07-23 ASSESSMENT — ACTIVITIES OF DAILY LIVING (ADL)
ADLS_ACUITY_SCORE: 60
ADLS_ACUITY_SCORE: 56
ADLS_ACUITY_SCORE: 54
ADLS_ACUITY_SCORE: 60
ADLS_ACUITY_SCORE: 56
ADLS_ACUITY_SCORE: 60
ADLS_ACUITY_SCORE: 56
ADLS_ACUITY_SCORE: 60
ADLS_ACUITY_SCORE: 56
ADLS_ACUITY_SCORE: 54
ADLS_ACUITY_SCORE: 60
ADLS_ACUITY_SCORE: 54
ADLS_ACUITY_SCORE: 56
ADLS_ACUITY_SCORE: 54
ADLS_ACUITY_SCORE: 54
ADLS_ACUITY_SCORE: 56
ADLS_ACUITY_SCORE: 54
ADLS_ACUITY_SCORE: 56

## 2025-07-23 ASSESSMENT — ENCOUNTER SYMPTOMS: DYSRHYTHMIAS: 1

## 2025-07-23 NOTE — OP NOTE
Preoperative diagnosis:  Left displaced intertrochanteric femur fracture with subtrochanteric extension     Postoperative diagnosis:  As above     Procedure:  Left femur open reduction internal fixation with intramedullary nail     Surgeon:  Babak Clark MD       Assistant:  MIKAELA Pizarro  A physicians assistant was available for the surgery and participated to decrease the patient's morbidity by assisting with positioning, manipulation of the limb during the procedure, surgical retraction as necessary, closure of the surgical wound and transferring the patient back to a hospital bed     Anesthesia:  General endotracheal     Estimated blood loss:  200 cc     Complications:  None readily apparent     Indications for procedure:  Pt is a pleasant 74-year-old male who fell from standing one day prior. The patient was unable to ambulate afterwards.  He was noted to have a significantly displaced intertrochanteric femur fracture with subtrochanteric extension. the risks, benefits and alternatives were discussed with the patient. We discussed all the risks, benefits, and alternatives to surgery.  These include but are not limited to nonunion, malunion, persistent pain, progression of posttraumatic arthritis, thromboembolic events, blood loss including need for possible transfusion, need for additional procedures, infection.  We also discussed the medical risks including but not limited to MI, stroke, and even potentially death.  After discussion of all these risks, he did wish to proceed with surgical intervention to restore his function and relieve his pain.       Description of procedure:  The patient was met in the preoperative area by myself. Informed consent was obtained as above. Thus, initials were placed on the operative leg indicating the correct operative extremity. The patient was then brought to the operating room where an appropriate anesthetic was induced by the anesthesia service. This was  successful. He patient was placed supine on the operating [place] table ensuring all bony prominences were well padded. The operative leg was then prepped and draped in sterile fashion. Time-out was called by the surgical team. The correct patient, hospital identification number, laterality and procedure are confirmed.  All in attendance were in agreement.     Using traction and rotation we were able to achieve a provisional reduction of the fracture utilizing the [place] table prior to an incision.  We then additionally utilized small percutaneous incisions in order to utilize a ball spike and bone hook for additional assistance with our percutaneous reduction.  We then utilized fluoroscopy to achieve or start spot as desired just medial to the greater trochanter and in the center of the greater trochanter on the lateral view to minimize the risk of varus alignment.  The finger instrument was used to direct the pin into the correct trajectory down the canal while we held the reduction. We then used the opening reamer to open the canal but only inserted this part way so that we could first slowly ream up so that we had more ability to modify a reduction if needed while holding our reduction with the [place] table and ball spike and bone hook.  We began reaming with a 9 mm reamer and reamed all the way up to a 13. 5 mm reamer.  We utilized fluoroscopy to ensure that we maintain the reduction throughout this.  At this time, we then placed our opening reamer and ensured that our trajectory was as medial as we could get to minimize the risk of varus again.  We again checked our reduction, which had appropriate length, alignment, and rotation.  We measured our length of the nail and chose an appropriately sized 420 mm length nail.  A 12 x 420 mm [place] gamma trochanteric nail was assembled and placed using the jig.  This was impacted into place. We utilized a ball spike and bone hook to line up the lateral cortex of the proximal  femur to help guide our overall length, alignment, and rotation of the fracture reduction. We then utilized the jig to place a lag screw and ensured that the lag screw into the femoral head was placed centrally in the femoral head to ensure that it was not in varus alignment.  The lag was measured to be 115 mm and was placed ensuring an appropriate tip apex distance.  We utilized the jig in order to compress at the fracture site which did improve our alignment laterally as expected based on our provisional reduction with the ball spike.  We did not attempt to reduce the lesser trochanter piece as we had planned preoperatively.  We let off the traction.  We then went distally and placed 2 distal interlock screws utilizing perfect [place] technique without complication into both of the static distal screw holes.  Both of these had appropriate purchase.  At this time, we then took final fluoroscopy images on both AP and lateral demonstrating no intraoperative complications.     The wounds were washed thoroughly with irrigation. The iliotibial band was closed with 0 Vicryl and the deep dermis was closed with 2-0 Monocryl throughout. The skin was closed with 3-0 Monocryl and a sterile dressing was placed. The patient was then transferred safely to hospital bed.     Postoperative Plan  He will be admitted to the hospital correa for postoperative monitoring and pain control.  He may weight bear as tolerated on the operative lower extremity.  It appears that he takes a baby aspirin for DVT prophylaxis and would recommend resuming this tomorrow in addition to Lovenox while in house.  Typically we would plan to utilize aspirin 325 upon discharge for total 6 weeks after this type of injury if that is otherwise okay with his medicine team.  He will follow up in the office in 2 weeks for wound check.

## 2025-07-23 NOTE — ANESTHESIA PREPROCEDURE EVALUATION
"Anesthesia Pre-Procedure Evaluation    Patient: Leodan Yanez   MRN: [id number] : 1950          Procedure : Procedure(s):  OPEN REDUCTION INTERNAL FIXATION, FRACTURE, FEMUR, USING INTRAMEDULLARY HERB         Past Medical History:   Diagnosis Date    A-fib (H)     Anemia     with ulcer    Arrhythmia     Arthritis     shoulders, knees    Gastroesophageal reflux disease     Hernia of unspecified site of abdominal cavity without mention of obstruction or gangrene     History of blood transfusion     Hyperlipemia     Hypertension     Kidney stone     Neuropathy     from waist down    Retinal detachment     Lefty Eye    Sleep apnea     cpap      Past Surgical History:   Procedure Laterality Date    rut shoulder rotator cuff repairs      CATARACT IOL, RT/LT Left 2017    explant dislocated IOL/anterior vitrectomy    COLONOSCOPY      EXCHANGE INTRAOCULAR LENS IMPLANT Right 2017    Procedure: EXCHANGE INTRAOCULAR LENS IMPLANT;  RIGHT INTRAOCULAR LENS EXPLANT WITH SECONDARY  INTRAOCULAR LENS IMPLANT WITH  IRIS  FIXATION;  Surgeon: John Lindsey MD;  Location: [facility]    EYE SURGERY      GASTRIC BYPASS OPEN  2006    Dr. John Gannon, open RYGB w/ cholecystectomy @ [facility], 2006    HERNIORRHAPHY INCISIONAL (LOCATION) N/A 11/10/2017    Procedure: HERNIORRHAPHY INCISIONAL (LOCATION);  Incisional hernia Repair with mesh and component separation;  Surgeon: Cayetano Vivar MD;  Location: [facility] OR    IMPLANT SECONDARY INTRAOCULAR LENS IMPLANT Bilateral \"20 years ago\"    per patient    ORTHOPEDIC SURGERY      REPOSITION INTRAOCULAR LENS Right 2017    Procedure: REPOSITION INTRAOCULAR LENS;;  Surgeon: John Lindsey MD;  Location: [facility]    rt knee replacement      VITRECTOMY PARSPLANA WITH 25 GAUGE SYSTEM Right 2017    Procedure: VITRECTOMY PARSPLANA WITH 25 GAUGE SYSTEM;  RIGHT EYE VITRECTOMY PARSPLANA WITH 25 GAUGE SYSTEM, ENDOLASER,  REPOSIONING OF INTRAOCULAR " LENS WITH SCLERAL FIXATION;  Surgeon: Rose Weston MD;  Location: [facility] LAPAROSCOPIC REPAIR OF GASTRIC ULCER W/GALILEO PATCH        No Known Allergies   Social History     Tobacco Use    Smoking status: Never    Smokeless tobacco: Never   Substance Use Topics    Alcohol use: No      Wt Readings from Last 1 Encounters:   07/22/25 (!) 146.1 kg (322 lb 1.5 oz)        Anesthesia Evaluation   Pt has had prior anesthetic. Type: General.    No history of anesthetic complications       ROS/MED HX  ENT/Pulmonary:     (+) sleep apnea, uses CPAP,                                      Neurologic:  - neg neurologic ROS     Cardiovascular:     (+)  hypertension- -   -  - -                        dysrhythmias, a-fib,             METS/Exercise Tolerance:     Hematologic:  - neg hematologic  ROS     Musculoskeletal:  - neg musculoskeletal ROS     GI/Hepatic:     (+) GERD,                   Renal/Genitourinary:     (+)       Nephrolithiasis ,       Endo:     (+)               Obesity,       Psychiatric/Substance Use:  - neg psychiatric ROS     Infectious Disease:  - neg infectious disease ROS     Malignancy:  - neg malignancy ROS     Other:  - neg other ROS            Physical Exam  Airway  TM distance: >3 FB  Neck ROM: full  Upper bite lip test: I  Mouth opening: >= 4 cm    Cardiovascular - normal exam  Rhythm: regular  Rate: normal rate     Dental Comments: Upper denture      Pulmonary - normal examBreath sounds clear to auscultation        Neurological - normal exam  He appears awake, alert and oriented x3.    Other Findings       OUTSIDE LABS:  CBC:   Lab Results   Component Value Date    WBC 8.2 07/22/2025    WBC 8.1 11/19/2021    HGB 13.4 07/22/2025    HGB 14.7 11/19/2021    HCT 40.3 07/22/2025    HCT 45.4 11/19/2021     07/22/2025     11/19/2021     BMP:   Lab Results   Component Value Date     07/23/2025     07/22/2025    POTASSIUM 4.0 07/23/2025    POTASSIUM 3.7 07/22/2025     CHLORIDE 100 07/23/2025    CHLORIDE 114 (H) 07/22/2025    CO2 27 07/23/2025    CO2 20 (L) 07/22/2025    BUN 18.2 07/23/2025    BUN 13.9 07/22/2025    CR 0.95 07/23/2025    CR 0.76 07/22/2025    GLC 96 07/23/2025     (H) 07/23/2025     COAGS:   Lab Results   Component Value Date    PTT 23 07/22/2025    INR 1.03 07/22/2025     POC:   Lab Results   Component Value Date     (H) 11/11/2017     HEPATIC:   Lab Results   Component Value Date    ALBUMIN 3.7 07/23/2020    PROTTOTAL 7.4 07/23/2020    ALT 17 07/23/2020    AST 18 07/23/2020    ALKPHOS 119 07/23/2020    BILITOTAL 0.9 07/23/2020     OTHER:   Lab Results   Component Value Date    KELLY 8.7 (L) 07/23/2025    PHOS 3.7 07/23/2025    MAG 2.2 07/23/2025    LIPASE 81 07/23/2020    CRP 3.5 11/19/2021    SED 6 11/19/2021       Anesthesia Plan    ASA Status:  3      NPO Status: NPO Appropriate   Anesthesia Type: General.  Airway: oral.  Induction: intravenous.   Techniques and Equipment:       - Monitoring Plan: standard ASA monitoring     Consents    Anesthesia Plan(s) and associated risks, benefits, and realistic alternatives discussed. Questions answered and patient/representative(s) expressed understanding.     - Discussed: anesthesiologist     - Discussed with:  Patient          Blood Consent:      - Discussed with: patient.     - Consented: consented to blood products     Postoperative Care    Pain management: plan for postoperative opioid use, multimodal analgesia.     Comments:                   Joe Minor MD    I have reviewed the pertinent notes and labs in the chart from the past 30 days and (re)examined the patient.  Any updates or changes from those notes are reflected in this note.    Clinically Significant Risk Factors Present on Admission        # Hypokalemia: Lowest K = 2.7 mmol/L in last 2 days, will replace as needed   # Hyperchloremia: Highest Cl = 114 mmol/L in last 2 days, will monitor as appropriate        # Hypomagnesemia:  "Lowest Mg = 1.3 mg/dL in last 2 days, will replace as needed     # Drug Induced Platelet Defect: home medication list includes an antiplatelet medication   # Hypertension: Noted on problem list           # Morbid Obesity: Estimated body mass index is 42.49 kg/m  as calculated from the following:    Height as of this encounter: 1.854 m (6' 1\").    Weight as of this encounter: 146.1 kg (322 lb 1.5 oz).                    "

## 2025-07-23 NOTE — ANESTHESIA CARE TRANSFER NOTE
Patient: Leodan Yanez    Procedure: Procedure(s):  OPEN REDUCTION INTERNAL FIXATION, FRACTURE, FEMUR, USING INTRAMEDULLARY HERB       Diagnosis: Displaced intertroch fx left femur, init for opn fx type I/2 (H) [S72.142B]  Diagnosis Additional Information: No value filed.    Anesthesia Type:   General     Note:    Oropharynx: oropharynx clear of all foreign objects  Level of Consciousness: awake  Oxygen Supplementation: face mask  Level of Supplemental Oxygen (L/min / FiO2): 8  Independent Airway: airway patency satisfactory and stable  Dentition: dentition unchanged  Vital Signs Stable: post-procedure vital signs reviewed and stable  Report to RN Given: handoff report given  Patient transferred to: PACU    Handoff Report: Identifed the Patient, Identified the Reponsible Provider, Reviewed the pertinent medical history, Discussed the surgical course, Reviewed Intra-OP anesthesia mangement and issues during anesthesia, Set expectations for post-procedure period and Allowed opportunity for questions and acknowledgement of understanding      Vitals:  Vitals Value Taken Time   /82    Temp 36    Pulse 85    Resp 20 07/23/25 11:08   SpO2 100 % 07/23/25 11:08   Vitals shown include unfiled device data.    Electronically Signed By: SAMMI Martinez CRNA  July 23, 2025  11:10 AM

## 2025-07-23 NOTE — PLAN OF CARE
"Goal Outcome Evaluation:      Plan of Care Reviewed With: patient    Overall Patient Progress: no changeOverall Patient Progress: no change    Outcome Evaluation: VSS, SATS 90's on RA. cms baseline neuropathy in feet. pain in left hip is minimal with rest, tylenol and oxycodone taken, ice pack applied. Pcd;s applied. Voids per urinal Chg bath done. Pt will be NPO after midnight, surgery in am.falls risk precautions, on bedrest.      Problem: Adult Inpatient Plan of Care  Goal: Plan of Care Review  Description: The Plan of Care Review/Shift note should be completed every shift.  The Outcome Evaluation is a brief statement about your assessment that the patient is improving, declining, or no change.  This information will be displayed automatically on your shift  note.  Outcome: Not Progressing  Flowsheets (Taken 7/22/2025 2127)  Outcome Evaluation:   VSS, SATS 90's on RA. cms baseline neuropathy in feet. pain in left hip is minimal with rest, tylenol and oxycodone taken, ice pack applied. Pcd   s applied. Voids per urinal Chg bath done. Pt will be NPO after midnight, surgery in am.falls risk precautions, on bedrest.  Plan of Care Reviewed With: patient  Overall Patient Progress: no change  Goal: Patient-Specific Goal (Individualized)  Description: You can add care plan individualizations to a care plan. Examples of Individualization might be:  \"Parent requests to be called daily at 9am for status\", \"I have a hard time hearing out of my right ear\", or \"Do not touch me to wake me up as it startles  me\".  Outcome: Not Progressing  Goal: Absence of Hospital-Acquired Illness or Injury  Outcome: Not Progressing  Intervention: Identify and Manage Fall Risk  Recent Flowsheet Documentation  Taken 7/22/2025 2039 by Jocelynn Lechuga, RN  Safety Promotion/Fall Prevention:   activity supervised   assistive device/personal items within reach   increase visualization of patient   lighting adjusted  Intervention: Prevent Skin " Injury  Recent Flowsheet Documentation  Taken 7/22/2025 2039 by Jocelynn Lechuga RN  Body Position:   turned   right  Skin Protection: adhesive use limited  Taken 7/22/2025 2000 by Jocelynn Lechuga RN  Body Position: turned  Intervention: Prevent and Manage VTE (Venous Thromboembolism) Risk  Recent Flowsheet Documentation  Taken 7/22/2025 2039 by Jocelynn Lechuga RN  VTE Prevention/Management: SCDs on (sequential compression devices)  Intervention: Prevent Infection  Recent Flowsheet Documentation  Taken 7/22/2025 2039 by Jocelynn Lechuga RN  Infection Prevention:   rest/sleep promoted   single patient room provided  Goal: Optimal Comfort and Wellbeing  Outcome: Not Progressing  Intervention: Monitor Pain and Promote Comfort  Recent Flowsheet Documentation  Taken 7/22/2025 2107 by Jocelynn Lechuga RN  Pain Management Interventions:   medication (see MAR)   cold applied   care clustered   repositioned   rest  Intervention: Provide Person-Centered Care  Recent Flowsheet Documentation  Taken 7/22/2025 2039 by Jocelynn Lechuag RN  Trust Relationship/Rapport:   care explained   questions answered   questions encouraged   thoughts/feelings acknowledged  Goal: Readiness for Transition of Care  Outcome: Not Progressing     Problem: Delirium  Goal: Optimal Coping  Outcome: Not Progressing  Intervention: Optimize Psychosocial Adjustment to Delirium  Recent Flowsheet Documentation  Taken 7/22/2025 2039 by Jocelynn Lechuga RN  Family/Support System Care: self-care encouraged  Goal: Improved Behavioral Control  Outcome: Not Progressing  Intervention: Minimize Safety Risk  Recent Flowsheet Documentation  Taken 7/22/2025 2039 by Jocelynn Lechuga RN  Enhanced Safety Measures: pain management  Trust Relationship/Rapport:   care explained   questions answered   questions encouraged   thoughts/feelings acknowledged  Goal: Improved Attention and Thought Clarity  Outcome: Not Progressing  Intervention: Maximize Cognitive  Function  Recent Flowsheet Documentation  Taken 7/22/2025 2039 by Jocelynn Lechuga RN  Reorientation Measures: clock in view  Goal: Improved Sleep  Outcome: Not Progressing  Intervention: Promote Sleep  Recent Flowsheet Documentation  Taken 7/22/2025 2039 by Jocelynn Lechuga RN  Sleep/Rest Enhancement: relaxation techniques promoted     Problem: Hip Fracture Surgical Repair  Goal: Optimal Coping with Change in Health Status  Outcome: Not Progressing  Intervention: Support Psychosocial Response to Surgery and Mobility Changes  Recent Flowsheet Documentation  Taken 7/22/2025 2039 by Jocelynn Lechuga RN  Family/Support System Care: self-care encouraged  Goal: Absence of Bleeding  Outcome: Not Progressing  Goal: Effective Bowel Elimination  Outcome: Not Progressing  Intervention: Enhance Bowel Motility and Elimination  Recent Flowsheet Documentation  Taken 7/22/2025 2039 by Jocelynn Lechuga RN  Bowel Motility Enhancement: fluid intake encouraged  Bowel Elimination Management: relaxation techniques promoted  Bowel Elimination Promotion: adequate fluid intake promoted  Goal: Cognitive Function Maintained  Outcome: Not Progressing  Intervention: Maintain Cognitive Function  Recent Flowsheet Documentation  Taken 7/22/2025 2039 by Jocelynn Lechuga RN  Reorientation Measures: clock in view  Sleep/Rest Enhancement: relaxation techniques promoted  Goal: Fluid and Electrolyte Balance  Outcome: Not Progressing  Goal: Absence of Infection Signs and Symptoms  Outcome: Not Progressing  Intervention: Prevent or Manage Infection  Recent Flowsheet Documentation  Taken 7/22/2025 2039 by Jocelynn Lechuga RN  Infection Management: aseptic technique maintained  Goal: Optimal Functional Ability  Outcome: Not Progressing  Intervention: Protect Joint Integrity  Recent Flowsheet Documentation  Taken 7/22/2025 2039 by Jocelynn Lechuga RN  Equipment:   ice   On:  Intervention: Promote Optimal Functional Status  Recent Flowsheet  Documentation  Taken 7/22/2025 2039 by Jocelynn Lechuga RN  Self-Care Promotion:   independence encouraged   BADL personal objects within reach  Activity Management:   activity adjusted per tolerance   bedrest  Taken 7/22/2025 2000 by Jocelynn Lechuga RN  Self-Care Promotion:   independence encouraged   BADL personal objects within reach  Activity Management:   activity adjusted per tolerance   bedrest  Goal: Anesthesia/Sedation Recovery  Outcome: Not Progressing  Intervention: Optimize Anesthesia Recovery  Recent Flowsheet Documentation  Taken 7/22/2025 2039 by Jocelynn Lechuga RN  Safety Promotion/Fall Prevention:   activity supervised   assistive device/personal items within reach   increase visualization of patient   lighting adjusted  Reorientation Measures: clock in view  Goal: Optimal Pain Control and Function  Outcome: Not Progressing  Intervention: Prevent or Manage Pain  Recent Flowsheet Documentation  Taken 7/22/2025 2107 by Jocelynn Lechuga RN  Pain Management Interventions:   medication (see MAR)   cold applied   care clustered   repositioned   rest  Goal: Nausea and Vomiting Relief  Outcome: Not Progressing  Goal: Effective Urinary Elimination  Outcome: Not Progressing  Intervention: Monitor and Manage Urinary Retention  Recent Flowsheet Documentation  Taken 7/22/2025 2039 by Jocelynn Lechuga RN  Urinary Elimination Promotion:   positioned for ease of voiding   toileting device within reach  Goal: Effective Oxygenation and Ventilation  Outcome: Not Progressing  Intervention: Optimize Oxygenation and Ventilation  Recent Flowsheet Documentation  Taken 7/22/2025 2039 by Jocelynn Lechuga RN  Head of Bed (HOB) Positioning: HOB at 45 degrees  Taken 7/22/2025 2000 by Jocelynn Lechuga RN  Head of Bed (HOB) Positioning: HOB at 30-45 degrees

## 2025-07-23 NOTE — PLAN OF CARE
"Goal Outcome Evaluation:      Plan of Care Reviewed With: patient    Overall Patient Progress: no changeOverall Patient Progress: no change             [id number]   Increased pain this AM. Prn 0.4 Dilaudid given.  Baseline neuropathy. Has been NPO since midnight. Procedure scheduled for 0730 in OR4 Tele Afib 61. RA. CHG bath completed, CHG wipes completed. Bladder scanned for 89ml this AM.       Problem: Adult Inpatient Plan of Care  Goal: Plan of Care Review  Description: The Plan of Care Review/Shift note should be completed every shift.  The Outcome Evaluation is a brief statement about your assessment that the patient is improving, declining, or no change.  This information will be displayed automatically on your shift  note.  Outcome: Progressing  Flowsheets (Taken 7/23/2025 0323)  Plan of Care Reviewed With: patient  Overall Patient Progress: no change  Goal: Patient-Specific Goal (Individualized)  Description: You can add care plan individualizations to a care plan. Examples of Individualization might be:  \"Parent requests to be called daily at 9am for status\", \"I have a hard time hearing out of my right ear\", or \"Do not touch me to wake me up as it startles  me\".  Outcome: Progressing  Goal: Absence of Hospital-Acquired Illness or Injury  Outcome: Progressing  Intervention: Identify and Manage Fall Risk  Recent Flowsheet Documentation  Taken 7/23/2025 0031 by Ryan Courtney RN  Safety Promotion/Fall Prevention:   activity supervised   assistive device/personal items within reach   increase visualization of patient   lighting adjusted  Intervention: Prevent Skin Injury  Recent Flowsheet Documentation  Taken 7/23/2025 0031 by Ryan Courtney RN  Body Position: turned  Skin Protection: adhesive use limited  Intervention: Prevent and Manage VTE (Venous Thromboembolism) Risk  Recent Flowsheet Documentation  Taken 7/23/2025 0031 by Ryan Courtney RN  VTE Prevention/Management: SCDs on (sequential compression " devices)  Intervention: Prevent Infection  Recent Flowsheet Documentation  Taken 7/23/2025 0031 by Ryan Courtney RN  Infection Prevention:   rest/sleep promoted   single patient room provided  Goal: Optimal Comfort and Wellbeing  Outcome: Progressing  Goal: Readiness for Transition of Care  Outcome: Progressing     Problem: Delirium  Goal: Optimal Coping  Outcome: Progressing  Goal: Improved Behavioral Control  Outcome: Progressing  Intervention: Minimize Safety Risk  Recent Flowsheet Documentation  Taken 7/23/2025 0031 by Ryan Courtney RN  Enhanced Safety Measures: pain management  Goal: Improved Attention and Thought Clarity  Outcome: Progressing  Goal: Improved Sleep  Outcome: Progressing     Problem: Hip Fracture Surgical Repair  Goal: Optimal Coping with Change in Health Status  Outcome: Progressing  Goal: Absence of Bleeding  Outcome: Progressing  Goal: Effective Bowel Elimination  Outcome: Progressing  Intervention: Enhance Bowel Motility and Elimination  Recent Flowsheet Documentation  Taken 7/23/2025 0031 by Ryan Courtney RN  Bowel Elimination Promotion: adequate fluid intake promoted  Goal: Cognitive Function Maintained  Outcome: Progressing  Goal: Fluid and Electrolyte Balance  Outcome: Progressing  Goal: Absence of Infection Signs and Symptoms  Outcome: Progressing  Goal: Optimal Functional Ability  Outcome: Progressing  Intervention: Protect Joint Integrity  Recent Flowsheet Documentation  Taken 7/23/2025 0031 by Ryan Courtney RN  Equipment:   ice   On:  Goal: Anesthesia/Sedation Recovery  Outcome: Progressing  Intervention: Optimize Anesthesia Recovery  Recent Flowsheet Documentation  Taken 7/23/2025 0031 by Ryan Courtney RN  Safety Promotion/Fall Prevention:   activity supervised   assistive device/personal items within reach   increase visualization of patient   lighting adjusted  Goal: Optimal Pain Control and Function  Outcome: Progressing  Goal: Nausea and Vomiting Relief  Outcome:  Progressing  Goal: Effective Urinary Elimination  Outcome: Progressing  Goal: Effective Oxygenation and Ventilation  Outcome: Progressing

## 2025-07-23 NOTE — PROGRESS NOTES
[facility]    Medicine Progress Note - Hospitalist Service  Date of Admission: 7/22/2025     Assessment & Plan         Leodan Yanez is a 74 year old male with past medical history significant for AAA, atrial fibrillation, hypertension, hyperlipidemia, BPH, MONSE, GERD, insomnia, neuropathy, glaucoma, and nephrolithiasis who presented to [facility] on 7/22/2025 after a fall and was found to have displaced intertrochanteric proximal left femur fracture s/p ORIF.    Comminuted, Displaced, Left, Intertrochanteric/Subtrochanteric, Proximal Femur Fracture s/p ORIF  Presented after a mechanical fall. Imaging with comminuted displaced intertrochanteric/subtrochanteric fracture of the proximal left femur. Moderate degenerative arthrosis of both hips. Degenerative changes of both SI joints and the lower lumbar spine. Orthopedic Surgery consulted and took patient to OR this morning for ORIF with intramedullary nail. Now doing well post-operatively. Continue pain control overnight. PT/OT consulted.     Hypokalemia  Hypomagnesemia  Hypocalcemia  - replace per protocol  - check ionized calcium and replace prn  - hold Hctz     Chronic Medical Problems  Chronic Atrial Fibrillation - EKG with Afib, rate 61.  On ASA 81mg daily; not on anticoagulation; pt with hx of GI bleed.  Continue Toprol XL and hold ASA for surgery.   Chronic Peripheral Neuropathy - hx of BLE neuropathy with reported poor balance at baseline.  Uses a cane.  Continue Amitriptyline  HTN - continue Amlodipine and Toprol XL.  Hold Lisinopril and Hctz for surgery; continue tomorrow if creatinine stable and blood pressures warrant.  HLD - continue Pravastatin  GERD - continue Protonix  BPH - continue Flomax  STEPHEN - continue Fluoxetine  Hx AAA - Mild ascending aorta dilatation (4.1 cm) noted on echo 2019.  MONSE - continue cpap  Obesity - hx Mirella en Y gastric bypass 2006        Clinically Significant Risk Factors Present on Admission  "       # Hypokalemia: Lowest K = 2.7 mmol/L in last 2 days, will replace as needed   # Hyperchloremia: Highest Cl = 114 mmol/L in last 2 days, will monitor as appropriate        # Hypomagnesemia: Lowest Mg = 1.3 mg/dL in last 2 days, will replace as needed     # Drug Induced Platelet Defect: home medication list includes an antiplatelet medication   # Hypertension: Noted on problem list           # Morbid Obesity: Estimated body mass index is 42.49 kg/m  as calculated from the following:    Height as of this encounter: 1.854 m (6' 1\").    Weight as of this encounter: 146.1 kg (322 lb 1.5 oz).                Diet: Orders Placed This Encounter      Regular Diet Adult     DVT Prophylaxis: Start ASA + lovenox tomorrow  Linder: None  Code Status: Full Code    Medically Ready for Discharge: Anticipated in 2-4 Days       The patient's care was discussed with the Bedside Nurse and Patient.  I personally spent 45 minutes on chart review, documentation, coordination, and bedside management of patient.    Feng Salomon MD, S  Hospitalist Service  [facility]  ______________________________________________________________________    Interval History   Nursing notes reviewed. Patient feeling well post-op. Stating pain is only bad when he moves, but otherwise manageable. Reports feeling well otherwise. He is hungry.    A full 10+ point review of systems was performed and found to be negative with the exception of those items noted here.    Physical Exam   Temp: 98.1  F (36.7  C) Temp src: Temporal BP: 108/70 Pulse: 72   Resp: 14 SpO2: 97 % O2 Device: None (Room air) Oxygen Delivery: 10 LPM Height: 185.4 cm (6' 1\") Weight: (!) 146.1 kg (322 lb 1.5 oz)  Estimated body mass index is 42.49 kg/m  as calculated from the following:    Height as of this encounter: 1.854 m (6' 1\").    Weight as of this encounter: 146.1 kg (322 lb 1.5 oz).    General: Very pleasant male resting comfortably in hospital bed.  Awake, alert, " interactive.  HEENT: Normocephalic, atraumatic.  PERRL, EOMI.  Conjunctiva clear, sclerae anicteric.  Mucous membranes moist.  Cardiac: Regular rate and rhythm without murmur, gallop, or rub.  No peripheral edema.  Respiratory: Normal work of breathing.    Skin: No rashes or abrasions on exposed skin.  Neurologic: Alert and oriented x4.  Cranial nerves II through XII grossly intact.  Psychologic: Appropriate mood and affect.    Data   All laboratory results and other diagnostic data from the past 24 hours is available in Epic and has been personally reviewed.    Recent Labs   Lab 07/23/25  0618 07/23/25  0349 07/22/25 2115 07/22/25 2032 07/22/25  1500   WBC  --   --   --   --  8.2   HGB  --   --   --   --  13.4   MCV  --   --   --   --  87   PLT  --   --   --   --  189   INR  --   --  1.03  --   --    NA  --  138  --   --  144   POTASSIUM  --  4.0  --  3.7 2.7*   CHLORIDE  --  100  --   --  114*   CO2  --  27  --   --  20*   BUN  --  18.2  --   --  13.9   CR  --  0.95  --   --  0.76   ANIONGAP  --  11  --   --  10   KELLY  --  8.7*  --   --  6.0*   GLC 96 133*  --   --  101*       Imaging results reviewed over the past 24 hrs:   Recent Results (from the past 24 hours)   XR Pelvis 1/2 Views    Narrative    EXAM: XR PELVIS 1/2 VIEWS, XR FEMUR LEFT 2 VIEWS  LOCATION: [facility]  DATE: 7/22/2025    INDICATION: fall, pain  COMPARISON: None.      Impression    IMPRESSION: Comminuted displaced intertrochanteric/subtrochanteric fracture of the proximal left femur. Moderate degenerative arthrosis of both hips. Degenerative changes of both SI joints and the lower lumbar spine. No additional left femur fracture.    XR Femur Left 2 Views    Narrative    EXAM: XR PELVIS 1/2 VIEWS, XR FEMUR LEFT 2 VIEWS  LOCATION: [facility]  DATE: 7/22/2025    INDICATION: fall, pain  COMPARISON: None.      Impression    IMPRESSION: Comminuted displaced intertrochanteric/subtrochanteric fracture of  the proximal left femur. Moderate degenerative arthrosis of both hips. Degenerative changes of both SI joints and the lower lumbar spine. No additional left femur fracture.    CT Hip Left w/o Contrast    Narrative    EXAM: CT HIP LEFT W/O CONTRAST  LOCATION: [facility]  DATE: 7/22/2025    INDICATION: assess fracture  COMPARISON: Same day radiographs.   TECHNIQUE: Noncontrast. Axial, sagittal and coronal thin-section reconstruction. Dose reduction techniques were used.     FINDINGS:     BONES:  -Redemonstration of a comminuted intertrochanteric left femoral neck fracture with subtrochanteric extension. Impaction and mild external rotation of the intertrochanteric fracture component. There is a large 10.0 cm butterfly fragment involving the   lesser trochanter which is displaced approximately 4.0 cm proximally.  -Moderate degenerative arthrosis of the left hip.     SOFT TISSUES:  -Soft tissue edema about the left hip.  -Scattered arterial vascular calcifications.       Impression    IMPRESSION:    Comminuted displaced intertrochanteric left femoral neck fracture with distal subtrochanteric extension.      XR Surgery SARA L/T 5 Min Fluoro w Stills    Narrative    This exam was marked as non-reportable because it will not be read by a   radiologist or a [place] non-radiologist provider.         XR Femur Port Left 2 Views    Narrative    EXAM: XR FEMUR PORT LEFT 2 VIEWS  LOCATION: [facility]  DATE: 7/23/2025    INDICATION: pacu s p L femur IMN  COMPARISON: 07/22/2025       Impression    IMPRESSION: Interval reduction and intramedullary nailing of a comminuted proximal left femoral fracture. No instrumentation failure.      I personally reviewed: no images or EKG's today.

## 2025-07-23 NOTE — BRIEF OP NOTE
[facility]    Brief Operative Note    Pre-operative diagnosis: Displaced intertroch fx left femur, init for opn fx type I/2 (H) [S72.142B]  Post-operative diagnosis Same as pre-operative diagnosis    Procedure: OPEN REDUCTION INTERNAL FIXATION, FRACTURE, FEMUR, USING INTRAMEDULLARY HERB, Left - Leg    Surgeon: Surgeons and Role:     * Babak Ball MD - Primary  Anesthesia: General   Estimated Blood Loss: 200 ml    Drains: None  Specimens: * No specimens in log *  Findings:   None.  Complications: None.  Implants:   Implant Name Type Inv. Item Serial No.  Lot No. LRB No. Used Action   GW ORTH 1000MM 3MM T2 ALPHA STRL LF DISP 2351-3102S - [id number] Wire GW ORTH 1000MM 3MM T2 ALPHA STRL LF DISP 2351-3102S  [employer] [id number] Left 1 Used as a Supply   PIN FIXATION GAMMA 3.2/3.9MM 4MM 450MM TEMP TAPER 1420-0065S - [id number] Metallic Hardware/[employer] PIN FIXATION GAMMA 3.2/3.9MM 4MM 450MM TEMP TAPER 1420-0065S  [employer] [id number] Left 1 Used as a Supply   IMP WIRE BECCA 3.0P124PV 1210-6450S - [id number]  IMP WIRE BECCA [id number] 1210-6450S  [employer] [id number] Left 1 Used as a Supply   IMP WIRE BECCA 3.1S802OX 1210-6450S - [id number]  IMP WIRE BECCA 3.1V068PG 1210-6450S  [employer] [id number] Left 1 Used as a Supply   NAIL GAMMA LONG LEFT [id number] X 125DEG 8525-2420S - [id number] Metallic Hardware/[employer] NAIL GAMMA LONG LEFT [id number] X 125DEG 8525-2420S  [employer] [id number] Left 1 Implanted   SCREW BONE LAG GAMMA D10.7B099OI 8160-0115S - [id number] Metallic Hardware/[employer] SCREW BONE LAG GAMMA D10.9I527FR 8160-0115S  [employer] [id number] Left 1 Implanted   SCREW BN 45MM 5MM LCK STRL T2 ALPHA 2360-5045S - [id number] Metallic Hardware/[employer] SCREW BN 45MM 5MM LCK STRL T2 ALPHA 7108-3868S  [employer] [id number] Left 1 Implanted   SCREW BN 70MM 5MM LCK STRL T2 ALPHA - [id number] Metallic Hardware/[employer] SCREW  BN 70MM 5MM LCK STRL T2 ALPHA  [employer] [id number] Left 1 Implanted

## 2025-07-23 NOTE — ANESTHESIA PROCEDURE NOTES
Airway       Patient location during procedure: OR       Procedure Start/Stop Times: 7/23/2025 7:46 AM  Staff -        CRNA: David Jean Baptiste APRN CRNA       Performed By: CRNA  Consent for Airway        Urgency: elective  Indications and Patient Condition       Indications for airway management: vida-procedural       Induction type:intravenous       Mask difficulty assessment: 1 - vent by mask    Final Airway Details       Final airway type: endotracheal airway       Successful airway: ETT - single  Endotracheal Airway Details        ETT size (mm): 8.0       Cuffed: yes       Successful intubation technique: video laryngoscopy       VL Blade Size: Glidescope 4       Grade View of Cords: 1       Adjucts: stylet       Position: Right       Measured from: lips       Secured at (cm): 24    Post intubation assessment        Placement verified by: capnometry, equal breath sounds and chest rise        Number of attempts at approach: 1       Number of other approaches attempted: 0       Secured with: tape       Ease of procedure: easy       Dentition: Unchanged    Medication(s) Administered   Medication Administration Time: 7/23/2025 7:46 AM

## 2025-07-23 NOTE — PLAN OF CARE
"Goal Outcome Evaluation:    VSS, on RA. CMS intact. L hip incision CDI. Not OOB this shift. Voiding well, tolerating regular diet. Pain controlled with Tylenol. On capno. ORIF performed today. IV antibx infusing. Discharge plan tbd.       Plan of Care Reviewed With: patient    Overall Patient Progress: improvingOverall Patient Progress: improving         Problem: Adult Inpatient Plan of Care  Goal: Plan of Care Review  Description: The Plan of Care Review/Shift note should be completed every shift.  The Outcome Evaluation is a brief statement about your assessment that the patient is improving, declining, or no change.  This information will be displayed automatically on your shift  note.  Outcome: Progressing  Flowsheets (Taken 7/23/2025 1643)  Plan of Care Reviewed With: patient  Overall Patient Progress: improving  Goal: Patient-Specific Goal (Individualized)  Description: You can add care plan individualizations to a care plan. Examples of Individualization might be:  \"Parent requests to be called daily at 9am for status\", \"I have a hard time hearing out of my right ear\", or \"Do not touch me to wake me up as it startles  me\".  Outcome: Progressing  Goal: Absence of Hospital-Acquired Illness or Injury  Outcome: Progressing  Intervention: Identify and Manage Fall Risk  Recent Flowsheet Documentation  Taken 7/23/2025 1423 by Mamta Ward RN  Safety Promotion/Fall Prevention:   activity supervised   assistive device/personal items within reach   clutter free environment maintained   safety round/check completed  Intervention: Prevent Skin Injury  Recent Flowsheet Documentation  Taken 7/23/2025 1423 by Mamta Ward, RN  Body Position: supine, head elevated  Intervention: Prevent and Manage VTE (Venous Thromboembolism) Risk  Recent Flowsheet Documentation  Taken 7/23/2025 1423 by Mamta Ward, RN  VTE Prevention/Management: SCDs on (sequential compression devices)  Intervention: Prevent Infection  Recent " Flowsheet Documentation  Taken 7/23/2025 1423 by Mamta Ward, RN  Infection Prevention: rest/sleep promoted  Goal: Optimal Comfort and Wellbeing  Outcome: Progressing  Intervention: Monitor Pain and Promote Comfort  Recent Flowsheet Documentation  Taken 7/23/2025 1423 by Mamta Ward, RN  Pain Management Interventions: declines  Goal: Readiness for Transition of Care  Outcome: Progressing

## 2025-07-23 NOTE — CONSULTS
"ORTHOPAEDIC SURGERY CONSULTATION NOTE  CONSULTING PROVIDER:  Babak Clark MD      HISTORY OF PRESENT ILLNESS  Leodan Yanez is a 74 year old male who presents for evaluation of left hip pain after fall yesterday.  Patient has a history ofhistory of Ascending Aortic Aneurysm, Glaucoma, Nephrolithiasis, Obesity, Atrial Fibrillation, MONSE, HTN, GERD, HLD, Insomnia, Neuropathy, Anxiety, BPH.  He excellently tripped in his kitchen and fell down and was unable to stand afterwards.  He also has significant pain in his left knee which she is planning to get a total knee replacement in the future.    MEDICAL HISTORY  Past Medical History:   Diagnosis Date    A-fib (H)     Anemia     with ulcer    Arrhythmia     Arthritis     shoulders, knees    Gastroesophageal reflux disease     Hernia of unspecified site of abdominal cavity without mention of obstruction or gangrene     History of blood transfusion     Hyperlipemia     Hypertension     Kidney stone     Neuropathy     from waist down    Retinal detachment 1993    [facility]    Sleep apnea     cpap       SURGICAL HISTORY  Past Surgical History:   Procedure Laterality Date    rut shoulder rotator cuff repairs      CATARACT IOL, RT/LT Left 12/20/2017    explant dislocated IOL/anterior vitrectomy    COLONOSCOPY      EXCHANGE INTRAOCULAR LENS IMPLANT Right 08/14/2017    Procedure: EXCHANGE INTRAOCULAR LENS IMPLANT;  RIGHT INTRAOCULAR LENS EXPLANT WITH SECONDARY  INTRAOCULAR LENS IMPLANT WITH  IRIS  FIXATION;  Surgeon: John Lindsey MD;  Location: [facility]    EYE SURGERY      GASTRIC BYPASS OPEN  06/20/2006    Dr. John Gannon, open RYGB w/ cholecystectomy @ [facility], 6/20/2006    HERNIORRHAPHY INCISIONAL (LOCATION) N/A 11/10/2017    Procedure: HERNIORRHAPHY INCISIONAL (LOCATION);  Incisional hernia Repair with mesh and component separation;  Surgeon: Cayetano Vivar MD;  Location: [facility] OR    IMPLANT SECONDARY INTRAOCULAR LENS IMPLANT Bilateral \"20 " "years ago\"    per patient    ORTHOPEDIC SURGERY      REPOSITION INTRAOCULAR LENS Right 05/12/2017    Procedure: REPOSITION INTRAOCULAR LENS;;  Surgeon: John Lindsey MD;  Location: [facility]    rt knee replacement      VITRECTOMY PARSPLANA WITH 25 GAUGE SYSTEM Right 05/12/2017    Procedure: VITRECTOMY PARSPLANA WITH 25 GAUGE SYSTEM;  RIGHT EYE VITRECTOMY PARSPLANA WITH 25 GAUGE SYSTEM, ENDOLASER,  REPOSIONING OF INTRAOCULAR LENS WITH SCLERAL FIXATION;  Surgeon: Rose Weston MD;  Location: [facility] LAPAROSCOPIC REPAIR OF GASTRIC ULCER W/GALILEO PATCH         CURRENT MEDICATIONS    Current Facility-Administered Medications:     [Auto Hold] acetaminophen (TYLENOL) tablet 1,000 mg, 1,000 mg, Oral, Q8H, Edith Martínez PA-C, 1,000 mg at 07/23/25 0519    [Auto Hold] acetaminophen (TYLENOL) tablet 650 mg, 650 mg, Oral, Q4H PRN, Edith Martínez PA-C    [Auto Hold] amitriptyline (ELAVIL) tablet 75 mg, 75 mg, Oral, At Bedtime, Edith Martínez PA-C, 75 mg at 07/22/25 2106    [Auto Hold] amLODIPine (NORVASC) tablet 10 mg, 10 mg, Oral, Daily, Edith Martínez PA-C    ceFAZolin Sodium (ANCEF) injection 3 g, 3 g, Intravenous, Pre-Op/Pre-procedure x 1 dose, Babak Ball MD    ceFAZolin Sodium (ANCEF) injection 3 g, 3 g, Intravenous, See Admin Instructions, Babak Ball MD    [Auto Hold] FLUoxetine (PROzac) capsule 40 mg, 40 mg, Oral, QAM, Edith Martínez PA-C    [Auto Hold] HYDROmorphone (DILAUDID) injection 0.2 mg, 0.2 mg, Intravenous, Q2H PRN, Edith Martínez PA-C    [Auto Hold] HYDROmorphone (DILAUDID) injection 0.4 mg, 0.4 mg, Intravenous, Q2H PRN, Edith Martínez PA-C, 0.4 mg at 07/23/25 0519    [COMPLETED] HYDROmorphone (PF) (DILAUDID) injection 0.5 mg, 0.5 mg, Intravenous, Once, 0.5 mg at 07/22/25 1501 **AND** [Auto Hold] HYDROmorphone (PF) (DILAUDID) injection 0.5 mg, 0.5 mg, Intravenous, Q30 Min PRN, Thomas Campbell MD    lactated ringers infusion, , " Intravenous, Continuous, Gurpreet Lauren MD, Last Rate: 10 mL/hr at 07/23/25 0604, New Bag at 07/23/25 0604    lidocaine (LMX4) cream, , Topical, Q1H PRN, Gurpreet Lauren MD    [Auto Hold] lidocaine (LMX4) cream, , Topical, Q1H PRN, Edith Martínez PA-C    lidocaine 1 % 0.1-1 mL, 0.1-1 mL, Other, Q1H PRN, Gurpreet Lauren MD    [Auto Hold] lidocaine 1 % 0.1-1 mL, 0.1-1 mL, Other, Q1H PRN, Edith Martínez PA-C    [Auto Hold] metoprolol succinate ER (TOPROL XL) 24 hr tablet 200 mg, 200 mg, Oral, Daily, Edith Martínez PA-C    [Auto Hold] naloxone (NARCAN) injection 0.2 mg, 0.2 mg, Intravenous, Q2 Min PRN **OR** [Auto Hold] naloxone (NARCAN) injection 0.4 mg, 0.4 mg, Intravenous, Q2 Min PRN **OR** [Auto Hold] naloxone (NARCAN) injection 0.2 mg, 0.2 mg, Intramuscular, Q2 Min PRN **OR** [Auto Hold] naloxone (NARCAN) injection 0.4 mg, 0.4 mg, Intramuscular, Q2 Min PRN, Michael Bishop DO    [Auto Hold] ondansetron (ZOFRAN ODT) ODT tab 4 mg, 4 mg, Oral, Q6H PRN **OR** [Auto Hold] ondansetron (ZOFRAN) injection 4 mg, 4 mg, Intravenous, Q6H PRN, Edith Martínez PA-C    [Auto Hold] oxyCODONE (ROXICODONE) tablet 5 mg, 5 mg, Oral, Q4H PRN, Edith Martínez PA-C    [Auto Hold] oxyCODONE IR (ROXICODONE) half-tab 2.5 mg, 2.5 mg, Oral, Q4H PRN, Edith Martínez PA-C, 2.5 mg at 07/22/25 2107    [Auto Hold] pantoprazole (PROTONIX) EC tablet 40 mg, 40 mg, Oral, Daily, Edith Martínez PA-C    [Auto Hold] polyethylene glycol (MIRALAX) Packet 17 g, 17 g, Oral, BID PRN, Edith Martínez PA-C    [Auto Hold] pravastatin (PRAVACHOL) tablet 20 mg, 20 mg, Oral, At Bedtime, Edith Martínez PA-C, 20 mg at 07/22/25 2107    [Auto Hold] senna-docusate (SENOKOT-S/PERICOLACE) 8.6-50 MG per tablet 1 tablet, 1 tablet, Oral, BID, 1 tablet at 07/22/25 2106 **OR** [Auto Hold] senna-docusate (SENOKOT-S/PERICOLACE) 8.6-50 MG per tablet 2 tablet, 2 tablet, Oral, BID, Edith Martínez  NATHANIEL OHARA    sodium chloride (PF) 0.9% PF flush 3 mL, 3 mL, Intracatheter, Q8H SKYLER, Gurpreet Lauren MD    sodium chloride (PF) 0.9% PF flush 3 mL, 3 mL, Intracatheter, q1 min prn, Gurpreet Lauren MD, 3 mL at 07/23/25 0605    [Auto Hold] sodium chloride (PF) 0.9% PF flush 3 mL, 3 mL, Intracatheter, Q8H SKYLER, Edith Martínez PA-C, 3 mL at 07/23/25 0519    [Auto Hold] sodium chloride (PF) 0.9% PF flush 3 mL, 3 mL, Intracatheter, q1 min prn, Edith Martínez PA-C    [Auto Hold] tamsulosin (FLOMAX) capsule 0.4 mg, 0.4 mg, Oral, QPM, Edith Martínez PA-C    tranexamic acid 1 g in 100 mL NS IV bag (premix), 1 g, Intravenous, Once, Babak Ball MD    tranexamic acid 1 g in 100 mL NS IV bag (premix), 1 g, Intravenous, Once, Babak Ball MD    SOCIAL HISTORY    Tobacco history is   History   Smoking Status    Never   Smokeless Tobacco    Never   .    REVIEW OF SYSTEMS  Constitutional: Negative for chills, fever, nausea, vomiting.  Objective   VITAL SIGNS  BP   Temp    Pulse   Resp    SpO2    Body mass index is 42.49 kg/m .    PHYSICAL EXAMINATION  Orthopedic Physical Examination  General Appearance: Patient appears active, comfortable, no acute distress.  NEURO: The patient is alert and oriented to person, place, and time and able to follow commands.  HEENT: Head normocephalic, atraumatic.  CVS: No cyanosis or obvious jugular venous distension .  Resp: No acute respiratory distress or increased inspiratory effort.     Left lower extremity:  Extremity held in external rotation  TTP over the hip, nontender distally  Neurologic: Able to fire GCS, TA, EHL  Intact but decreased at his baseline sensation to light touch through the deep peroneal, superficial peroneal, sural, saphenous, and tibial nerve distributions of the affected leg.  Well-perfused in the foot with palpable DP pulse    Right lower extremity and bilateral upper extremities nontender with normal range of  motion    DIAGNOSTICS  IMAGING:  I reviewed the 2 view x-rays of the left hip and 2 views x-rays of the left femur in addition to the CT of the left hip.  These show a comminuted intertrochanteric femur fracture with extension to the subtrochanteric area, specifically the lesser trochanter.      Assessment & Plan   Left hip intertrochanteric femur fracture with subtrochanteric extension    PLAN:  I discussed with the patient the treatment options for this.  We discussed that nonoperative management would likely leave him bedbound and have many associated medical complications.  He does wish to proceed with surgical intervention in the form of a left femur ORIF with intramedullary nail.  We discussed all the risks, benefits, and alternatives to surgery.  These include but are not limited to nonunion, malunion, persistent pain, progression of posttraumatic arthritis, thromboembolic events, blood loss including need for possible transfusion, need for additional procedures, infection.  We also discussed the medical risks including but not limited to MI, stroke, and even potentially death.  After discussion of all these risks, he does wish to proceed with surgical intervention to restore his function and relieve his pain.  We discussed that typically this procedure will allow him to put weight on his left hip immediately after surgery and we would have him begin working with physical therapy beginning on postoperative day 0 or postoperative day 1.  He likely will require a walker for an extended period of time given his neuropathy.    All patient questions were answered to the best of my ability at this visit. Thank you for consultation of this very pleasant patient    ADMINISTRATIVE/BILLIN minutes were spent in care of this patient greater than 60 of which were spent in counseling, imaging review and coordination of care.  Discussion of acute complicated injury of a left hip trochanteric femur fracture with  subtrochanteric extension.  Discussion of major surgery in the form of a left femur open reduction internal fixation with intramedullary nail

## 2025-07-24 ENCOUNTER — APPOINTMENT (OUTPATIENT)
Dept: PHYSICAL THERAPY | Facility: CLINIC | Age: 75
End: 2025-07-24
Attending: STUDENT IN AN ORGANIZED HEALTH CARE EDUCATION/TRAINING PROGRAM
Payer: COMMERCIAL

## 2025-07-24 LAB
HGB BLD-MCNC: 10.3 G/DL (ref 13.3–17.7)
HOLD SPECIMEN: NORMAL
MAGNESIUM SERPL-MCNC: 2.1 MG/DL (ref 1.7–2.3)
MCV RBC AUTO: 90 FL (ref 78–100)
PHOSPHATE SERPL-MCNC: 3.5 MG/DL (ref 2.5–4.5)
POTASSIUM SERPL-SCNC: 4 MMOL/L (ref 3.4–5.3)

## 2025-07-24 PROCEDURE — 99232 SBSQ HOSP IP/OBS MODERATE 35: CPT | Performed by: STUDENT IN AN ORGANIZED HEALTH CARE EDUCATION/TRAINING PROGRAM

## 2025-07-24 PROCEDURE — 97530 THERAPEUTIC ACTIVITIES: CPT | Mod: GP

## 2025-07-24 PROCEDURE — 250N000013 HC RX MED GY IP 250 OP 250 PS 637: Performed by: INTERNAL MEDICINE

## 2025-07-24 PROCEDURE — 250N000013 HC RX MED GY IP 250 OP 250 PS 637: Performed by: STUDENT IN AN ORGANIZED HEALTH CARE EDUCATION/TRAINING PROGRAM

## 2025-07-24 PROCEDURE — 84132 ASSAY OF SERUM POTASSIUM: CPT | Performed by: STUDENT IN AN ORGANIZED HEALTH CARE EDUCATION/TRAINING PROGRAM

## 2025-07-24 PROCEDURE — 83735 ASSAY OF MAGNESIUM: CPT | Performed by: STUDENT IN AN ORGANIZED HEALTH CARE EDUCATION/TRAINING PROGRAM

## 2025-07-24 PROCEDURE — 97161 PT EVAL LOW COMPLEX 20 MIN: CPT | Mod: GP

## 2025-07-24 PROCEDURE — 999N000111 HC STATISTIC OT IP EVAL DEFER: Performed by: REHABILITATION PRACTITIONER

## 2025-07-24 PROCEDURE — 36415 COLL VENOUS BLD VENIPUNCTURE: CPT | Performed by: STUDENT IN AN ORGANIZED HEALTH CARE EDUCATION/TRAINING PROGRAM

## 2025-07-24 PROCEDURE — 250N000011 HC RX IP 250 OP 636: Performed by: STUDENT IN AN ORGANIZED HEALTH CARE EDUCATION/TRAINING PROGRAM

## 2025-07-24 PROCEDURE — 120N000001 HC R&B MED SURG/OB

## 2025-07-24 PROCEDURE — 85018 HEMOGLOBIN: CPT | Performed by: PHYSICIAN ASSISTANT

## 2025-07-24 PROCEDURE — 250N000011 HC RX IP 250 OP 636: Performed by: PHYSICIAN ASSISTANT

## 2025-07-24 PROCEDURE — 84100 ASSAY OF PHOSPHORUS: CPT | Performed by: STUDENT IN AN ORGANIZED HEALTH CARE EDUCATION/TRAINING PROGRAM

## 2025-07-24 RX ORDER — ENOXAPARIN SODIUM 100 MG/ML
40 INJECTION SUBCUTANEOUS EVERY 24 HOURS
Status: DISCONTINUED | OUTPATIENT
Start: 2025-07-24 | End: 2025-07-31 | Stop reason: HOSPADM

## 2025-07-24 RX ADMIN — CEFAZOLIN SODIUM 2 G: 2 SOLUTION INTRAVENOUS at 05:58

## 2025-07-24 RX ADMIN — METHOCARBAMOL 500 MG: 500 TABLET ORAL at 08:14

## 2025-07-24 RX ADMIN — ACETAMINOPHEN 1000 MG: 500 TABLET, FILM COATED ORAL at 05:58

## 2025-07-24 RX ADMIN — ACETAMINOPHEN 1000 MG: 500 TABLET, FILM COATED ORAL at 21:12

## 2025-07-24 RX ADMIN — ACETAMINOPHEN 1000 MG: 500 TABLET, FILM COATED ORAL at 13:34

## 2025-07-24 RX ADMIN — TAMSULOSIN HYDROCHLORIDE 0.4 MG: 0.4 CAPSULE ORAL at 21:12

## 2025-07-24 RX ADMIN — FLUOXETINE HYDROCHLORIDE 40 MG: 20 CAPSULE ORAL at 08:14

## 2025-07-24 RX ADMIN — METHOCARBAMOL 500 MG: 500 TABLET ORAL at 21:14

## 2025-07-24 RX ADMIN — PRAVASTATIN SODIUM 20 MG: 20 TABLET ORAL at 21:12

## 2025-07-24 RX ADMIN — PANTOPRAZOLE SODIUM 40 MG: 40 TABLET, DELAYED RELEASE ORAL at 08:02

## 2025-07-24 RX ADMIN — ENOXAPARIN SODIUM 40 MG: 40 INJECTION SUBCUTANEOUS at 13:34

## 2025-07-24 RX ADMIN — METHOCARBAMOL 500 MG: 500 TABLET ORAL at 13:37

## 2025-07-24 RX ADMIN — AMITRIPTYLINE HYDROCHLORIDE 75 MG: 25 TABLET, FILM COATED ORAL at 21:12

## 2025-07-24 ASSESSMENT — ACTIVITIES OF DAILY LIVING (ADL)
ADLS_ACUITY_SCORE: 54
DEPENDENT_IADLS:: INDEPENDENT
ADLS_ACUITY_SCORE: 54

## 2025-07-24 NOTE — PROGRESS NOTES
"SPIRITUAL HEALTH SERVICES - Progress Note  [facility]  Referral Source/Reason for Visit: Emotional and spiritual support for length of stay.    Summary and Recommendations -  Oriented patient to [facility] while he processed his hospitalization.     Patient shared his wife is home with pneumonia and has not be able to see him since he was brought to the hospital.     He also shared his sadness around not being able to support his wife with their adult daughter who lives at home. He shared his daughter has autism and has high needs.     Patient is Christianity but is not affiliated with any Mosque. Patient's coleman is more through personal prayer.     Plan: Patient welcomes [facility] visits when available. [facility] remains available upon request.     HOLLAND Perea   Intern    [facility] available 24/7 for emergent requests/referrals, either by paging the on-call  or by entering an ASAP/STAT consult in Epic (this will also page the on-call ).       Assessment    Saw pt Leodan Yanez per emotional and spiritual support for length of stay.    Patient/Family Understanding of Illness and Goals of Care -   Patient shared he fell at his home and broke his hip--he had surgery yesterday.  Patient shared he is feeling well \"all things considering\" and is looking forward to going to rehab.     Distress and Loss -   Patient shared his experiences of his health and states \"you take it as it comes and it is what's expected at my age.\"  Patient shared his wife is home with pneumonia and has not be able to see him since he was brought to the hospital.   He also shared his sadness around not being able to support her with their adult daughter who lives at home. He shared his daughter has autism and has high needs.     Strengths, Coping, and Resources -   Patient named his wife as his main sources of support.  He named coping by talking with his friends and \"recognizing my privileged and how fortunate I am.\"     Meaning, " "Beliefs, and Spirituality -   Patient shared he has had a \"full life\" and enjoys sharing memories with friends and family.  Patient is Buddhism but is not affiliated with any [employer]. Patient's coleman is more through personal prayer.     "

## 2025-07-24 NOTE — PROVIDER NOTIFICATION
07/23/25 7581   Tech Time   $Tech Time (10 minute increments) 3   Mode: CPAP/ BiPAP/ AVAPS/ AVAPS AE   CPAP/BiPAP/ AVAPS/ AVAPS AE Mode CPAP   CPAP/BiPAP/Settings   BIPAP/CPAP On Standby On   O2 Flow Rate (L/min) 0   CPAP/BiPAP Patient Parameters   CPAP (cm H2O) 14 cmh2o   RT Device Skin Assessment   Oxygen Delivery Device CPAP/BiPAP Mask   Interface Face Mask - Large   Ventilator Arm In Place No   Site Appearance neck circumference Clean and dry   Site Appearance bridge of nose Clean and dry   Site Appearance occiput Clean and dry   Strap Tightness Finger Allowance between head and device strap   Device Skin Interventions Taken No adjustments needed     Patient on CPAP for the night. RT to follow

## 2025-07-24 NOTE — PROGRESS NOTES
Orthopedic Surgery  Leodan Yanez  07/24/2025     Admit Date:  7/22/2025  Assessment:   POD: 1 Day Post-Op   Procedure(s):  OPEN REDUCTION INTERNAL FIXATION, FRACTURE, FEMUR, USING INTRAMEDULLARY HERB: LEFT   Left knee pain      Patient resting comfortably in bed.    Pain controlled at rest.  States he has chronic left knee pain.   Tolerating oral intake.    Denies nausea or vomiting  Denies chest pain or shortness of breath    Temp:  [97.7  F (36.5  C)-98.4  F (36.9  C)] 98.4  F (36.9  C)  Pulse:  [71-95] 91  Resp:  [14-17] 16  BP: ()/(60-75) 99/61  FiO2 (%):  [21 %] 21 %  SpO2:  [95 %-98 %] 96 %    Alert and oriented  Dressing is clean, dry, and intact.   Moderate left thigh swelling present but compressible.   Moderate left knee effusion with mild global tenderness to palpation  Bilateral calves are soft, non-tender.  Left lower extremity is NVI.  Sensation equal along bilateral lower extremities  Patient able to resist dorsi and plantar flexion bilaterally  +Dp pulse    Labs:  Recent Labs   Lab Test 07/24/25  0646 07/22/25  1500 11/19/21  1746 07/23/20  0206   WBC  --  8.2 8.1 11.5*   HGB 10.3* 13.4 14.7 15.5   PLT  --  189 210 197     Recent Labs   Lab Test 07/22/25  2115   INR 1.03       Plan:   Lovenox inpatient for DVT prophylaxis, ASA at discharge (does have hx of GI bleed but states can tolerate higher dose of ASA for short periods of time).     Mobilize with PT/OT    WBAT Left LE with walker.     Continue current pain regiment.   Dressings: Keep intact.  Change if >60% saturated or peeling off.    Follow-up: 2 weeks post-op with Dr Clark team    Disposition:    Anticipate d/c to TCU when medically cleared and progressing in PT.    Yakelin Helm PA-C

## 2025-07-24 NOTE — PLAN OF CARE
"Goal Outcome Evaluation:      Plan of Care Reviewed With: patient    Overall Patient Progress: improvingOverall Patient Progress: improving    Outcome Evaluation: VSS, CPAP overnight. Ax2 SS to bedside commode. Ancef abx. Tolerating diet. IVF infusing. Baseline neuropathy. Surgical dressing CDI. No prns overnight. K+/Mg/Ph protocol, recheck in am. Plans TBD.    Problem: Adult Inpatient Plan of Care  Goal: Plan of Care Review  Description: The Plan of Care Review/Shift note should be completed every shift.  The Outcome Evaluation is a brief statement about your assessment that the patient is improving, declining, or no change.  This information will be displayed automatically on your shift  note.  Outcome: Progressing  Flowsheets (Taken 7/24/2025 0520)  Outcome Evaluation: VSS, CPAP overnight. Ax2 SS to bedside commode. Ancef abx. Tolerating diet. IVF infusing. Baseline neuropathy. Surgical dressing CDI. No prns overnight. K+/Mg/Ph protocol, recheck in am. Plans TBD.  Plan of Care Reviewed With: patient  Overall Patient Progress: improving  Goal: Patient-Specific Goal (Individualized)  Description: You can add care plan individualizations to a care plan. Examples of Individualization might be:  \"Parent requests to be called daily at 9am for status\", \"I have a hard time hearing out of my right ear\", or \"Do not touch me to wake me up as it startles  me\".  Outcome: Progressing  Goal: Absence of Hospital-Acquired Illness or Injury  Outcome: Progressing  Intervention: Prevent and Manage VTE (Venous Thromboembolism) Risk  Recent Flowsheet Documentation  Taken 7/24/2025 0126 by Tala Nolan RN  VTE Prevention/Management: SCDs on (sequential compression devices)  Goal: Optimal Comfort and Wellbeing  Outcome: Progressing  Goal: Readiness for Transition of Care  Outcome: Progressing     Problem: Delirium  Goal: Optimal Coping  Outcome: Progressing  Goal: Improved Behavioral Control  Outcome: Progressing  Goal: Improved " Attention and Thought Clarity  Outcome: Progressing  Goal: Improved Sleep  Outcome: Progressing     Problem: Hip Fracture Surgical Repair  Goal: Optimal Coping with Change in Health Status  Outcome: Progressing  Goal: Absence of Bleeding  Outcome: Progressing  Goal: Effective Bowel Elimination  Outcome: Progressing  Goal: Cognitive Function Maintained  Outcome: Progressing  Goal: Fluid and Electrolyte Balance  Outcome: Progressing  Goal: Absence of Infection Signs and Symptoms  Outcome: Progressing  Goal: Optimal Functional Ability  Outcome: Progressing  Goal: Anesthesia/Sedation Recovery  Outcome: Progressing  Goal: Optimal Pain Control and Function  Outcome: Progressing  Goal: Nausea and Vomiting Relief  Outcome: Progressing  Goal: Effective Urinary Elimination  Outcome: Progressing  Goal: Effective Oxygenation and Ventilation  Outcome: Progressing

## 2025-07-24 NOTE — PLAN OF CARE
"Goal Outcome Evaluation:      Plan of Care Reviewed With: patient    Overall Patient Progress: improvingOverall Patient Progress: improving    Outcome Evaluation: A&O. VSS on RA, capnography monitoring on. LR infusing. Pt on IV ancef. Ax2 smiley steady, up to bedside commode. Pt reported one episode on dizziness with pivoting to bedside commode, improved with breathing exercises and oxygen. Tolerating regular diet. Voiding well, trial to void completed. Baseline numbness to feet/hands. K+, Mg, Ph high replacement protocols, all set for AM redraw. Large loose stool this shift. Pain managed with PRN and scheduled medications. Denied nausea. From home with family, discharge TBD.    *CC paged for PRN muscle relaxer, ordered.     Problem: Adult Inpatient Plan of Care  Goal: Plan of Care Review  Description: The Plan of Care Review/Shift note should be completed every shift.  The Outcome Evaluation is a brief statement about your assessment that the patient is improving, declining, or no change.  This information will be displayed automatically on your shift  note.  Outcome: Progressing  Flowsheets (Taken 7/23/2025 2025)  Outcome Evaluation: A&O. VSS on RA, capnography monitoring on. LR infusing. Pt on IV ancef. Ax2 smiley steady, up to bedside commode. Tolerating regular diet. Voiding well, trial to void completed. Baseline numbness to feet/hands. K+, Mg, Ph high replacement protocols, all set for AM redraw. Large loose stool this shift. From home with family, discharge TBD.  Plan of Care Reviewed With: patient  Overall Patient Progress: improving  Goal: Patient-Specific Goal (Individualized)  Description: You can add care plan individualizations to a care plan. Examples of Individualization might be:  \"Parent requests to be called daily at 9am for status\", \"I have a hard time hearing out of my right ear\", or \"Do not touch me to wake me up as it startles  me\".  Outcome: Progressing  Goal: Absence of Hospital-Acquired Illness " or Injury  Outcome: Progressing  Intervention: Identify and Manage Fall Risk  Recent Flowsheet Documentation  Taken 7/23/2025 1700 by Allison Couch RN  Safety Promotion/Fall Prevention:   activity supervised   assistive device/personal items within reach   clutter free environment maintained   increased rounding and observation   increase visualization of patient   lighting adjusted   mobility aid in reach   nonskid shoes/slippers when out of bed   room door open   patient and family education   room near nurse's station   room organization consistent   supervised activity   safety round/check completed  Intervention: Prevent and Manage VTE (Venous Thromboembolism) Risk  Recent Flowsheet Documentation  Taken 7/23/2025 1700 by Allison Couch RN  VTE Prevention/Management: SCDs on (sequential compression devices)  Intervention: Prevent Infection  Recent Flowsheet Documentation  Taken 7/23/2025 1700 by Allison Couch RN  Infection Prevention:   cohorting utilized   hand hygiene promoted   equipment surfaces disinfected   rest/sleep promoted   single patient room provided  Goal: Optimal Comfort and Wellbeing  Outcome: Progressing  Goal: Readiness for Transition of Care  Outcome: Progressing     Problem: Delirium  Goal: Optimal Coping  Outcome: Progressing  Goal: Improved Behavioral Control  Outcome: Progressing  Intervention: Minimize Safety Risk  Recent Flowsheet Documentation  Taken 7/23/2025 1700 by Allison Couch RN  Enhanced Safety Measures:   assistive devices when indicated   monitor patients coagulation values   patient/family teach back on injury risk   Pre/Post Op education on fall prevention   pain management   review medications for side effects with activity   room near unit station  Goal: Improved Attention and Thought Clarity  Outcome: Progressing  Goal: Improved Sleep  Outcome: Progressing     Problem: Hip Fracture Surgical Repair  Goal: Optimal Coping with Change in Health Status  Outcome:  Progressing  Goal: Absence of Bleeding  Outcome: Progressing  Goal: Effective Bowel Elimination  Outcome: Progressing  Goal: Cognitive Function Maintained  Outcome: Progressing  Goal: Fluid and Electrolyte Balance  Outcome: Progressing  Goal: Absence of Infection Signs and Symptoms  Outcome: Progressing  Goal: Optimal Functional Ability  Outcome: Progressing  Intervention: Protect Joint Integrity  Recent Flowsheet Documentation  Taken 7/23/2025 1700 by Allison Couch RN  Equipment:   On:   ice  Intervention: Promote Optimal Functional Status  Recent Flowsheet Documentation  Taken 7/23/2025 1700 by Allison Couch RN  Activity Management:   up to bedside commode   standing at bedside   back to bed  Goal: Anesthesia/Sedation Recovery  Outcome: Progressing  Intervention: Optimize Anesthesia Recovery  Recent Flowsheet Documentation  Taken 7/23/2025 1700 by Allison Couch RN  Safety Promotion/Fall Prevention:   activity supervised   assistive device/personal items within reach   clutter free environment maintained   increased rounding and observation   increase visualization of patient   lighting adjusted   mobility aid in reach   nonskid shoes/slippers when out of bed   room door open   patient and family education   room near nurse's station   room organization consistent   supervised activity   safety round/check completed  Goal: Optimal Pain Control and Function  Outcome: Progressing  Goal: Nausea and Vomiting Relief  Outcome: Progressing  Intervention: Prevent or Manage Nausea and Vomiting  Recent Flowsheet Documentation  Taken 7/23/2025 1700 by Allison Couch RN  Nausea/Vomiting Interventions: (denied) other (see comments)  Goal: Effective Urinary Elimination  Outcome: Progressing  Goal: Effective Oxygenation and Ventilation  Outcome: Progressing

## 2025-07-24 NOTE — PROGRESS NOTES
"OT: Orders received. Chart reviewed and discussed with care team.  IP OT not indicated, per PT\"Patient is significantly below baseline level of independence with mobility, requiring Ax2 with a Ngozi Lolita for transfers at this time. He will benefit from skilled PT intervention at TCU to progress strength, activity tolerance and independence with mobility\".  Defer discharge recommendations to care team and OT to next level of care.  Will complete orders.    "

## 2025-07-24 NOTE — PROGRESS NOTES
[facility]    Medicine Progress Note - Hospitalist Service  Date of Admission: 7/22/2025     Assessment & Plan         Leodan Yanez is a 74 year old male with past medical history significant for AAA, atrial fibrillation, hypertension, hyperlipidemia, BPH, MONSE, GERD, insomnia, neuropathy, glaucoma, and nephrolithiasis who presented to [facility] on 7/22/2025 after a fall and was found to have displaced intertrochanteric proximal left femur fracture s/p ORIF.    Comminuted, Displaced, Left, Intertrochanteric/Subtrochanteric, Proximal Femur Fracture s/p ORIF  Presented after a mechanical fall. Imaging with comminuted displaced intertrochanteric/subtrochanteric fracture of the proximal left femur. Moderate degenerative arthrosis of both hips. Degenerative changes of both SI joints and the lower lumbar spine. Orthopedic Surgery consulted and took patient to OR this morning for ORIF with intramedullary nail. Now doing well post-operatively. Continue pain control overnight. PT/OT consulted.     Hypokalemia  Hypomagnesemia  Hypocalcemia  - replace per protocol  - check ionized calcium and replace prn  - hold Hctz     Chronic Medical Problems  Chronic Atrial Fibrillation - EKG with Afib, rate 61.  On ASA 81mg daily; not on anticoagulation; pt with hx of GI bleed.  Continue Toprol XL and hold ASA for surgery.   Chronic Peripheral Neuropathy - hx of BLE neuropathy with reported poor balance at baseline.  Uses a cane.  Continue Amitriptyline  HTN - continue Amlodipine and Toprol XL.  Hold Lisinopril and Hctz for surgery; continue tomorrow if creatinine stable and blood pressures warrant.  HLD - continue Pravastatin  GERD - continue Protonix  BPH - continue Flomax  STEPHEN - continue Fluoxetine  Hx AAA - Mild ascending aorta dilatation (4.1 cm) noted on echo 2019.  MONSE - continue cpap  Obesity - hx Mirella en Y gastric bypass 2006        Clinically Significant Risk Factors        # Hypokalemia:  "Lowest K = 2.7 mmol/L in last 2 days, will replace as needed   # Hyperchloremia: Highest Cl = 114 mmol/L in last 2 days, will monitor as appropriate        # Hypomagnesemia: Lowest Mg = 1.3 mg/dL in last 2 days, will replace as needed       # Hypertension: Noted on problem list            # Morbid Obesity: Estimated body mass index is 42.49 kg/m  as calculated from the following:    Height as of this encounter: 1.854 m (6' 1\").    Weight as of this encounter: 146.1 kg (322 lb 1.5 oz)., PRESENT ON ADMISSION              Diet: Orders Placed This Encounter      Regular Diet Adult     DVT Prophylaxis: Start ASA + lovenox tomorrow  Linder: None  Code Status: Full Code    Medically Ready for Discharge: Anticipated in 2-4 Days       The patient's care was discussed with the Bedside Nurse and Patient.  I personally spent 45 minutes on chart review, documentation, coordination, and bedside management of patient.    Feng Salomon MD, S  Hospitalist Service  [facility]  ______________________________________________________________________    Interval History   Nursing notes reviewed. Patient feeling well post-op. Stating pain is only bad when he moves, but otherwise manageable. Reports feeling well otherwise. Eager to work with PT and ok if TCU recommended.    A full 10+ point review of systems was performed and found to be negative with the exception of those items noted here.    Physical Exam   Temp: 98.4  F (36.9  C) Temp src: Temporal BP: 99/61 Pulse: 91   Resp: 16 SpO2: 96 % O2 Device: None (Room air) Oxygen Delivery: 10 LPM Height: 185.4 cm (6' 1\") Weight: (!) 146.1 kg (322 lb 1.5 oz)  Estimated body mass index is 42.49 kg/m  as calculated from the following:    Height as of this encounter: 1.854 m (6' 1\").    Weight as of this encounter: 146.1 kg (322 lb 1.5 oz).    General: Very pleasant male resting comfortably in hospital bed eating lunch.  Awake, alert, interactive.  HEENT: Normocephalic, " atraumatic.  PERRL, EOMI.  Conjunctiva clear, sclerae anicteric.  Mucous membranes moist  Respiratory: Normal work of breathing.    Skin: No rashes or abrasions on exposed skin.  Neurologic: Alert and oriented x4.  Cranial nerves II through XII grossly intact.  Psychologic: Appropriate mood and affect.    Data   All laboratory results and other diagnostic data from the past 24 hours is available in Epic and has been personally reviewed.    Recent Labs   Lab 07/24/25  0646 07/23/25  0618 07/23/25  0349 07/22/25 2115 07/22/25 2032 07/22/25  1500   WBC  --   --   --   --   --  8.2   HGB 10.3*  --   --   --   --  13.4   MCV 90  --   --   --   --  87   PLT  --   --   --   --   --  189   INR  --   --   --  1.03  --   --    NA  --   --  138  --   --  144   POTASSIUM 4.0  --  4.0  --  3.7 2.7*   CHLORIDE  --   --  100  --   --  114*   CO2  --   --  27  --   --  20*   BUN  --   --  18.2  --   --  13.9   CR  --   --  0.95  --   --  0.76   ANIONGAP  --   --  11  --   --  10   KELLY  --   --  8.7*  --   --  6.0*   GLC  --  96 133*  --   --  101*       Imaging results reviewed over the past 24 hrs:   No results found for this or any previous visit (from the past 24 hours).    I personally reviewed: no images or EKG's today.

## 2025-07-24 NOTE — CONSULTS
Care Management Initial Consult    General Information  Assessment completed with: Patient,    Type of CM/SW Visit: Initial Assessment    Primary Care Provider verified and updated as needed:     Readmission within the last 30 days: no previous admission in last 30 days      Reason for Consult: discharge planning  Advance Care Planning: Advance Care Planning Reviewed: no concerns identified        Communication Assessment  Patient's communication style: spoken language (English or Bilingual)    Hearing Difficulty or Deaf: yes   Wear Glasses or Blind: yes    Cognitive  Cognitive/Neuro/Behavioral: WDL                      Living Environment:   People in home: spouse     Current living Arrangements: house      Able to return to prior arrangements: other (see comments)  Living Arrangement Comments: TCU recommended    Family/Social Support:  Care provided by: spouse/significant other, self  Provides care for: no one, unable/limited ability to care for self  Marital Status:   Support system: Wife          Description of Support System:         Current Resources:   Patient receiving home care services: No      Community Resources: None  Equipment currently used at home: cane, straight, walker, rolling  Supplies currently used at home:      Employment/Financial:  Employment Status: retired        Financial Concerns: none      Does the patient's insurance plan have a 3 day qualifying hospital stay waiver?  Yes     Which insurance plan 3 day waiver is available? Alternative insurance waiver    Will the waiver be used for post-acute placement? Yes    Lifestyle & Psychosocial Needs:  Social Drivers of Health     Food Insecurity: Low Risk  (7/22/2025)    Food Insecurity     Within the past 12 months, did you worry that your food would run out before you got money to buy more?: No     Within the past 12 months, did the food you bought just not last and you didn t have money to get more?: No   Depression: At risk  (5/29/2025)    PHQ-2     PHQ-2 Score: 3   Housing Stability: Low Risk  (7/22/2025)    Housing Stability     Do you have housing? : Yes     Are you worried about losing your housing?: No   Tobacco Use: Low Risk  (7/23/2025)    Patient History     Smoking Tobacco Use: Never     Smokeless Tobacco Use: Never     Passive Exposure: Not on file   Financial Resource Strain: Low Risk  (7/22/2025)    Financial Resource Strain     Within the past 12 months, have you or your family members you live with been unable to get utilities (heat, electricity) when it was really needed?: No   Alcohol Use: Not on file   Transportation Needs: Low Risk  (7/22/2025)    Transportation Needs     Within the past 12 months, has lack of transportation kept you from medical appointments, getting your medicines, non-medical meetings or appointments, work, or from getting things that you need?: No   Physical Activity: Not on file   Interpersonal Safety: Low Risk  (7/23/2025)    Interpersonal Safety     Do you feel physically and emotionally safe where you currently live?: Yes     Within the past 12 months, have you been hit, slapped, kicked or otherwise physically hurt by someone?: No     Within the past 12 months, have you been humiliated or emotionally abused in other ways by your partner or ex-partner?: No   Stress: Not on file   Social Connections: Socially Integrated (5/2/2025)    [facility] [address] [facility]    Social Connections     Do you often feel lonely or isolated from those around you?: 0   Health Literacy: Not on file       Functional Status:  Prior to admission patient needed assistance:   Dependent ADLs:: Ambulation-cane, Independent  Dependent IADLs:: Independent     Values/Beliefs:  Spiritual, Cultural Beliefs, Adventist Practices, Values that affect care:    Description of Beliefs that Will Affect Care: Advent          Discussed  Partnership in Safe Discharge Planning  document with patient/family:  No    Additional Information:  Care management consult for discharge planning/disposition. Patient admitted following a fall resulting in left femur fracture. He underwent surgical repair on 7/23. PT evaluated patient today and recommendation is TCU at discharge. Patient currently requiring assist of 2 and a sarasteady for transfers.     Met with patient at bedside. Patient lives with spouse. Independent with cane or walker at baseline. Reviewed PT recommendation and patient is agreeable. He requested care coordinator to also call and discuss with spouse. Spouse was looking into his insurance coverage. He is agreeable to have referrals sent to the closer facilities to [place].   Initial TCU referrals sent to [facility], [facility] and [facility].     Left voicemail for patient's spouse to update.     Care management to follow for discharge planning.     Next Steps: follow up on TCU referrals    Alena Angela RN  Care Coordinator  [facility]

## 2025-07-24 NOTE — PROGRESS NOTES
07/24/25 0825   Appointment Info   Signing Clinician's Name / Credentials (PT) Teena Hamilton, PT, DPT   Living Environment   People in Home spouse;child(max), adult   Current Living Arrangements mobile home   Home Accessibility stairs to enter home   Transportation Anticipated family or friend will provide   Living Environment Comments Patient lives with his wife and step-daughter who is Autistic.   Self-Care   Usual Activity Tolerance moderate   Current Activity Tolerance poor   Regular Exercise No   Equipment Currently Used at Home cane, straight;walker, rolling   Fall history within last six months yes   Number of times patient has fallen within last six months 10   Activity/Exercise/Self-Care Comment Patient reports history of poor balance, but only uses his cane when he leaves the house. Has a walker but does not use.   General Information   Onset of Illness/Injury or Date of Surgery 07/22/25   Referring Physician Babak Ball MD   Patient/Family Therapy Goals Statement (PT) to get stronger before going home   Pertinent History of Current Problem (include personal factors and/or comorbidities that impact the POC) Patient is 73 YO M admitted on 7/22/25 after a mechanical fall with L proximal femur fracture. He is POD #1 s/p L femur ORIF. PMH includes  Ascending Aortic Aneurysm, Glaucoma, Nephrolithiasis, Obesity, Atrial Fibrillation, MONSE, HTN, GERD, HLD, Insomnia, Neuropathy, Anxiety, BPH.   Existing Precautions/Restrictions fall   Weight-Bearing Status - LLE weight-bearing as tolerated   General Observations Patient sitting up in bed agreeable to PT.   Cognition   Affect/Mental Status (Cognition) WFL;anxious   Orientation Status (Cognition) oriented x 4   Follows Commands (Cognition) WFL   Cognitive Status Comments Very tangential, needs frequent re-direction back to task. Often mentioning how fearful he is about falling, anxious about mobilizing.   Pain Assessment   Patient Currently in Pain Yes,  see Vital Sign flowsheet   Integumentary/Edema   Integumentary/Edema Comments Surgical bandages on L hip/knee intact; no acute edema concerns on B LE   Posture    Posture Protracted shoulders;Forward head position   Range of Motion (ROM)   Range of Motion ROM deficits secondary to surgical procedure;ROM deficits secondary to pain   ROM Comment Pain with PROM/AAROM on L hip/knee   Strength (Manual Muscle Testing)   Strength (Manual Muscle Testing) Deficits observed during functional mobility   Strength Comments Unable to perform L SLR; significant functional weakness noted during transfer attempts   Bed Mobility   Comment, (Bed Mobility) Supine to sit Mod A with HOB elevated, heavy use of bed rails and increased time   Transfers   Comment, (Transfers) Sit to stand with Ngozi Steady and Mod Ax2   Gait/Stairs (Locomotion)   Comment, (Gait/Stairs) Unable to ambulate at this time   Balance   Balance Comments SBA for sitting balance on EOB; Heavy use of arms on bar of Ngozi Stedy for standing balance   Sensory Examination   Sensory Perception Comments Severe chronic neuropathy from waist down and in B hands   Coordination   Coordination no deficits were identified   Clinical Impression   Criteria for Skilled Therapeutic Intervention Yes, treatment indicated   PT Diagnosis (PT) Impaired mobility   Influenced by the following impairments Pain, weakness, decreased activity tolerance, impaired sensation, impaired balance   Functional limitations due to impairments Increased difficulty with bed mobility, transfers and ambulation   Clinical Presentation (PT Evaluation Complexity) stable   Clinical Presentation Rationale Medical status, clinical judgement   Clinical Decision Making (Complexity) low complexity   Planned Therapy Interventions (PT) balance training;bed mobility training;cryotherapy;gait training;home exercise program;patient/family education;ROM (range of motion);stair training;strengthening;transfer  training;progressive activity/exercise;home program guidelines   Risk & Benefits of therapy have been explained evaluation/treatment results reviewed;care plan/treatment goals reviewed;risks/benefits reviewed;current/potential barriers reviewed;participants voiced agreement with care plan;participants included;patient   PT Total Evaluation Time   PT Kelly Low Complexity Minutes (84022) 9   Physical Therapy Goals   PT Frequency 5x/week   PT Predicted Duration/Target Date for Goal Attainment 08/07/25   PT Goals Bed Mobility;Transfers;Gait   PT: Bed Mobility Supervision/stand-by assist;Supine to/from sit   PT: Transfers Minimal assist;Bed to/from chair;Sit to/from stand;Assistive device  (FWW)   PT: Gait Minimal assist;Rolling walker;5 feet   PT Discharge Planning   PT Plan Repeated transfers in Ngozi Stedy, weight shifting, gait as able   PT Discharge Recommendation (DC Rec) Transitional Care Facility   PT Rationale for DC Rec Patient is significantly below baseline level of independence with mobility, requiring Ax2 with a Ngozi Mchugh for transfers at this time. He will benefit from skilled PT intervention at TCU to progress strength, activity tolerance and independence with mobility.   PT Brief overview of current status Ax2 with Ngozi Mchugh   PT Total Distance Amb During Session (feet) 0   Physical Therapy Time and Intention   Total Session Time (sum of timed and untimed services) 9

## 2025-07-25 ENCOUNTER — APPOINTMENT (OUTPATIENT)
Dept: ULTRASOUND IMAGING | Facility: CLINIC | Age: 75
End: 2025-07-25
Attending: STUDENT IN AN ORGANIZED HEALTH CARE EDUCATION/TRAINING PROGRAM
Payer: COMMERCIAL

## 2025-07-25 VITALS
HEIGHT: 73 IN | SYSTOLIC BLOOD PRESSURE: 123 MMHG | BODY MASS INDEX: 41.75 KG/M2 | WEIGHT: 315 LBS | DIASTOLIC BLOOD PRESSURE: 58 MMHG | OXYGEN SATURATION: 96 % | TEMPERATURE: 98.7 F | HEART RATE: 82 BPM | RESPIRATION RATE: 18 BRPM

## 2025-07-25 LAB
HGB BLD-MCNC: 10.1 G/DL (ref 13.3–17.7)
MAGNESIUM SERPL-MCNC: 2.1 MG/DL (ref 1.7–2.3)
MCV RBC AUTO: 88 FL (ref 78–100)
MCV RBC AUTO: 88 FL (ref 78–100)
PHOSPHATE SERPL-MCNC: 3 MG/DL (ref 2.5–4.5)
PLATELET # BLD AUTO: 141 10E3/UL (ref 150–450)
POTASSIUM SERPL-SCNC: 3.9 MMOL/L (ref 3.4–5.3)

## 2025-07-25 PROCEDURE — 999N000157 HC STATISTIC RCP TIME EA 10 MIN

## 2025-07-25 PROCEDURE — 94660 CPAP INITIATION&MGMT: CPT

## 2025-07-25 PROCEDURE — 85018 HEMOGLOBIN: CPT | Performed by: PHYSICIAN ASSISTANT

## 2025-07-25 PROCEDURE — 84132 ASSAY OF SERUM POTASSIUM: CPT | Performed by: STUDENT IN AN ORGANIZED HEALTH CARE EDUCATION/TRAINING PROGRAM

## 2025-07-25 PROCEDURE — 120N000001 HC R&B MED SURG/OB

## 2025-07-25 PROCEDURE — 250N000011 HC RX IP 250 OP 636: Performed by: PHYSICIAN ASSISTANT

## 2025-07-25 PROCEDURE — 250N000013 HC RX MED GY IP 250 OP 250 PS 637: Performed by: STUDENT IN AN ORGANIZED HEALTH CARE EDUCATION/TRAINING PROGRAM

## 2025-07-25 PROCEDURE — 99232 SBSQ HOSP IP/OBS MODERATE 35: CPT | Performed by: STUDENT IN AN ORGANIZED HEALTH CARE EDUCATION/TRAINING PROGRAM

## 2025-07-25 PROCEDURE — 93971 EXTREMITY STUDY: CPT | Mod: LT

## 2025-07-25 PROCEDURE — 85049 AUTOMATED PLATELET COUNT: CPT | Performed by: PHYSICIAN ASSISTANT

## 2025-07-25 PROCEDURE — 83735 ASSAY OF MAGNESIUM: CPT | Performed by: STUDENT IN AN ORGANIZED HEALTH CARE EDUCATION/TRAINING PROGRAM

## 2025-07-25 PROCEDURE — 84100 ASSAY OF PHOSPHORUS: CPT | Performed by: STUDENT IN AN ORGANIZED HEALTH CARE EDUCATION/TRAINING PROGRAM

## 2025-07-25 PROCEDURE — 250N000013 HC RX MED GY IP 250 OP 250 PS 637: Performed by: INTERNAL MEDICINE

## 2025-07-25 PROCEDURE — 36415 COLL VENOUS BLD VENIPUNCTURE: CPT | Performed by: PHYSICIAN ASSISTANT

## 2025-07-25 RX ORDER — SENNOSIDES 8.6 MG
325 CAPSULE ORAL DAILY
Qty: 42 TABLET | Refills: 0 | DISCHARGE
Start: 2025-07-25 | End: 2025-07-30

## 2025-07-25 RX ORDER — METHOCARBAMOL 500 MG/1
500 TABLET, FILM COATED ORAL 4 TIMES DAILY PRN
Qty: 30 TABLET | Refills: 0 | DISCHARGE
Start: 2025-07-25

## 2025-07-25 RX ORDER — OXYCODONE HYDROCHLORIDE 5 MG/1
5 TABLET ORAL EVERY 6 HOURS PRN
Qty: 20 TABLET | Refills: 0 | Status: SHIPPED | OUTPATIENT
Start: 2025-07-25

## 2025-07-25 RX ORDER — AMOXICILLIN 250 MG
1 CAPSULE ORAL 2 TIMES DAILY PRN
Qty: 30 TABLET | Refills: 0 | DISCHARGE
Start: 2025-07-25

## 2025-07-25 RX ADMIN — ACETAMINOPHEN 1000 MG: 500 TABLET, FILM COATED ORAL at 21:38

## 2025-07-25 RX ADMIN — SENNOSIDES AND DOCUSATE SODIUM 1 TABLET: 50; 8.6 TABLET ORAL at 21:38

## 2025-07-25 RX ADMIN — FLUOXETINE HYDROCHLORIDE 40 MG: 20 CAPSULE ORAL at 09:41

## 2025-07-25 RX ADMIN — AMITRIPTYLINE HYDROCHLORIDE 75 MG: 25 TABLET, FILM COATED ORAL at 21:37

## 2025-07-25 RX ADMIN — ENOXAPARIN SODIUM 40 MG: 40 INJECTION SUBCUTANEOUS at 14:38

## 2025-07-25 RX ADMIN — SENNOSIDES AND DOCUSATE SODIUM 1 TABLET: 50; 8.6 TABLET ORAL at 09:41

## 2025-07-25 RX ADMIN — PRAVASTATIN SODIUM 20 MG: 20 TABLET ORAL at 21:38

## 2025-07-25 RX ADMIN — ACETAMINOPHEN 1000 MG: 500 TABLET, FILM COATED ORAL at 06:30

## 2025-07-25 RX ADMIN — PANTOPRAZOLE SODIUM 40 MG: 40 TABLET, DELAYED RELEASE ORAL at 09:41

## 2025-07-25 RX ADMIN — METHOCARBAMOL 500 MG: 500 TABLET ORAL at 19:14

## 2025-07-25 RX ADMIN — TAMSULOSIN HYDROCHLORIDE 0.4 MG: 0.4 CAPSULE ORAL at 21:38

## 2025-07-25 RX ADMIN — ACETAMINOPHEN 1000 MG: 500 TABLET, FILM COATED ORAL at 14:38

## 2025-07-25 ASSESSMENT — ACTIVITIES OF DAILY LIVING (ADL)
ADLS_ACUITY_SCORE: 54
ADLS_ACUITY_SCORE: 58
ADLS_ACUITY_SCORE: 58
ADLS_ACUITY_SCORE: 54
ADLS_ACUITY_SCORE: 54
ADLS_ACUITY_SCORE: 58
ADLS_ACUITY_SCORE: 58
ADLS_ACUITY_SCORE: 54
ADLS_ACUITY_SCORE: 58
ADLS_ACUITY_SCORE: 54
ADLS_ACUITY_SCORE: 58
ADLS_ACUITY_SCORE: 54
ADLS_ACUITY_SCORE: 58
ADLS_ACUITY_SCORE: 54
ADLS_ACUITY_SCORE: 54
ADLS_ACUITY_SCORE: 58
ADLS_ACUITY_SCORE: 54
ADLS_ACUITY_SCORE: 58
ADLS_ACUITY_SCORE: 54
ADLS_ACUITY_SCORE: 58
ADLS_ACUITY_SCORE: 54
ADLS_ACUITY_SCORE: 58
ADLS_ACUITY_SCORE: 54

## 2025-07-25 NOTE — PROGRESS NOTES
[facility]    Medicine Progress Note - Hospitalist Service  Date of Admission: 7/22/2025     Assessment & Plan         Leodan Yanez is a 74 year old male with past medical history significant for AAA, atrial fibrillation, hypertension, hyperlipidemia, BPH, MONSE, GERD, insomnia, neuropathy, glaucoma, and nephrolithiasis who presented to [facility] on 7/22/2025 after a fall and was found to have displaced intertrochanteric proximal left femur fracture s/p ORIF.    Comminuted, Displaced, Left, Intertrochanteric/Subtrochanteric, Proximal Femur Fracture s/p ORIF  Presented after a mechanical fall. Imaging with comminuted displaced intertrochanteric/subtrochanteric fracture of the proximal left femur. Moderate degenerative arthrosis of both hips. Degenerative changes of both SI joints and the lower lumbar spine. Orthopedic Surgery consulted and took patient to OR this morning for ORIF with intramedullary nail. Now doing well post-operatively. Continue pain control overnight. PT/OT consulted. Awaiting TCU placement.    Hypokalemia  Hypomagnesemia  Hypocalcemia  - replace per protocol  - check ionized calcium and replace prn  - hold Hctz     Chronic Medical Problems  Chronic Atrial Fibrillation - EKG with Afib, rate 61.  On ASA 81mg daily; not on anticoagulation; pt with hx of GI bleed.  Continue Toprol XL and hold ASA for surgery.   Chronic Peripheral Neuropathy - hx of BLE neuropathy with reported poor balance at baseline.  Uses a cane.  Continue Amitriptyline  HTN - continue Amlodipine and Toprol XL.  Hold Lisinopril and Hctz for surgery; continue tomorrow if creatinine stable and blood pressures warrant.  HLD - continue Pravastatin  GERD - continue Protonix  BPH - continue Flomax  STEPHEN - continue Fluoxetine  Hx AAA - Mild ascending aorta dilatation (4.1 cm) noted on echo 2019.  MONSE - continue cpap  Obesity - hx Mirella en Y gastric bypass 2006        Clinically Significant Risk Factors  "                  # Hypertension: Noted on problem list            # Morbid Obesity: Estimated body mass index is 42.49 kg/m  as calculated from the following:    Height as of this encounter: 1.854 m (6' 1\").    Weight as of this encounter: 146.1 kg (322 lb 1.5 oz)., PRESENT ON ADMISSION     # Financial/Environmental Concerns: none           Diet: Orders Placed This Encounter      Regular Diet Adult     DVT Prophylaxis: Start ASA + lovenox tomorrow  Linder: None  Code Status: Full Code    Medically Ready for Discharge: Ready Now; awaiting placement       The patient's care was discussed with the Bedside Nurse and Patient.  I personally spent 45 minutes on chart review, documentation, coordination, and bedside management of patient.    Feng Salomon MD, MHS  Hospitalist Service  [facility]  ______________________________________________________________________    Interval History   Nursing notes reviewed. Patient feeling well post-op. Having minimal pain. Did endorse \"scratchy throat\" yesterday evening, but stated this has resolved.    A full 10+ point review of systems was performed and found to be negative with the exception of those items noted here.    Physical Exam   Temp: 98  F (36.7  C) Temp src: Temporal BP: 107/61 Pulse: 93   Resp: 16 SpO2: 98 % O2 Device: None (Room air) Oxygen Delivery: 10 LPM Height: 185.4 cm (6' 1\") Weight: (!) 146.1 kg (322 lb 1.5 oz)  Estimated body mass index is 42.49 kg/m  as calculated from the following:    Height as of this encounter: 1.854 m (6' 1\").    Weight as of this encounter: 146.1 kg (322 lb 1.5 oz).    General: Very pleasant male resting comfortably in hospital bed eating lunch.  Awake, alert, interactive.  HEENT: Normocephalic, atraumatic.  PERRL, EOMI.  Conjunctiva clear, sclerae anicteric.  Mucous membranes moist  Respiratory: Normal work of breathing.    Skin: No rashes or abrasions on exposed skin.  Neurologic: Alert and oriented x4.  Cranial nerves " II through XII grossly intact.  Psychologic: Appropriate mood and affect.    Data   All laboratory results and other diagnostic data from the past 24 hours is available in Epic and has been personally reviewed.    Recent Labs   Lab 07/25/25  0614 07/24/25  0646 07/23/25  0618 07/23/25  0349 07/22/25 2115 07/22/25 2032 07/22/25  1500   WBC  --   --   --   --   --   --  8.2   HGB 10.1* 10.3*  --   --   --   --  13.4   MCV 88  88 90  --   --   --   --  87   *  --   --   --   --   --  189   INR  --   --   --   --  1.03  --   --    NA  --   --   --  138  --   --  144   POTASSIUM 3.9 4.0  --  4.0  --    < > 2.7*   CHLORIDE  --   --   --  100  --   --  114*   CO2  --   --   --  27  --   --  20*   BUN  --   --   --  18.2  --   --  13.9   CR  --   --   --  0.95  --   --  0.76   ANIONGAP  --   --   --  11  --   --  10   KELLY  --   --   --  8.7*  --   --  6.0*   GLC  --   --  96 133*  --   --  101*    < > = values in this interval not displayed.       Imaging results reviewed over the past 24 hrs:   No results found for this or any previous visit (from the past 24 hours).    I personally reviewed: no images or EKG's today.

## 2025-07-25 NOTE — PLAN OF CARE
"Goal Outcome Evaluation:      Plan of Care Reviewed With: patient    Pt A&O. Pain managed with robaxin and tylenol. Tolerating diet, denies N/V. [place] [facility] [place]. LLE dressings CDI. CMS intact ex numbness - baseline per pt.       Problem: Adult Inpatient Plan of Care  Goal: Plan of Care Review  Description: The Plan of Care Review/Shift note should be completed every shift.  The Outcome Evaluation is a brief statement about your assessment that the patient is improving, declining, or no change.  This information will be displayed automatically on your shift  note.  Outcome: Progressing  Flowsheets (Taken 7/24/2025 2223)  Plan of Care Reviewed With: patient  Goal: Patient-Specific Goal (Individualized)  Description: You can add care plan individualizations to a care plan. Examples of Individualization might be:  \"Parent requests to be called daily at 9am for status\", \"I have a hard time hearing out of my right ear\", or \"Do not touch me to wake me up as it startles  me\".  Outcome: Progressing  Goal: Absence of Hospital-Acquired Illness or Injury  Outcome: Progressing  Goal: Optimal Comfort and Wellbeing  Outcome: Progressing  Intervention: Monitor Pain and Promote Comfort  Recent Flowsheet Documentation  Taken 7/24/2025 1421 by Carmen Marina, RN  Pain Management Interventions:   medication (see MAR)   pain management plan reviewed with patient/caregiver  Goal: Readiness for Transition of Care  Outcome: Progressing     Problem: Delirium  Goal: Optimal Coping  Outcome: Progressing  Goal: Improved Behavioral Control  Outcome: Progressing  Goal: Improved Attention and Thought Clarity  Outcome: Progressing  Goal: Improved Sleep  Outcome: Progressing     Problem: Hip Fracture Surgical Repair  Goal: Optimal Coping with Change in Health Status  Outcome: Progressing  Goal: Absence of Bleeding  Outcome: Progressing  Goal: Effective Bowel Elimination  Outcome: Progressing  Goal: Cognitive " Function Maintained  Outcome: Progressing  Goal: Fluid and Electrolyte Balance  Outcome: Progressing  Goal: Absence of Infection Signs and Symptoms  Outcome: Progressing  Goal: Optimal Functional Ability  Outcome: Progressing  Goal: Anesthesia/Sedation Recovery  Outcome: Progressing  Goal: Optimal Pain Control and Function  Outcome: Progressing  Intervention: Prevent or Manage Pain  Recent Flowsheet Documentation  Taken 7/24/2025 1421 by Carmen Marina RN  Pain Management Interventions:   medication (see MAR)   pain management plan reviewed with patient/caregiver  Goal: Nausea and Vomiting Relief  Outcome: Progressing  Goal: Effective Urinary Elimination  Outcome: Progressing  Goal: Effective Oxygenation and Ventilation  Outcome: Progressing

## 2025-07-25 NOTE — PROGRESS NOTES
Care Management Follow Up    Length of Stay (days): 3    Expected Discharge Date: 07/26/2025     Concerns to be Addressed: discharge planning     Patient plan of care discussed at interdisciplinary rounds: Yes    Anticipated Discharge Disposition: Transitional Care     Anticipated Discharge Services: None  Anticipated Discharge DME:      Patient/family educated on Medicare website which has current facility and service quality ratings: yes  Education Provided on the Discharge Plan:    Patient/Family in Agreement with the Plan: yes    Referrals Placed by CM/SW: Post Acute Facilities  Private pay costs discussed: Not applicable    Discussed  Partnership in Safe Discharge Planning  document with patient/family: No     Handoff Completed: No, handoff not indicated or clinically appropriate    Additional Information:  Care management following for discharge planning. Looking for TCU placement.    Do not have an accepting bed yet. [facility] is considering pending further clinical review. [facility] Garcia still pending.     Updated patient of above.     1556 received message from [facility] that they are considering but would like to see some ambulation with PT prior to accepting patient. Let rehab lead know. Patient is scheduled for PT tomorrow. Did have [facility] look at him and he is out of network for them but potentially an ARU candidate.     Next Steps: follow up on TCU referrals, send more if needed- consider ARU referral to zahraa Angela RN  Care Coordinator  [facility]

## 2025-07-25 NOTE — PROGRESS NOTES
Orthopedic Surgery  Leodan Yanez  07/25/2025     Admit Date:  7/22/2025  Assessment:   POD: 2 Days Post-Op   Procedure(s):  OPEN REDUCTION INTERNAL FIXATION, FRACTURE, FEMUR, USING INTRAMEDULLARY HERB: LEFT   Left knee pain      Patient resting comfortably in bed.    Pain controlled today.  He endorses having muscular cramps and pain following surgery which was relieved with muscle relaxer yesterday.  He reports getting out of bed and sitting in chair, going to the bathroom.  He does endorse localized tenderness of the left posterior calf  Tolerating oral intake.    Denies nausea or vomiting  Denies chest pain or shortness of breath  Denies numbness and tingling  VSS, afebrile    Temp:  [97.1  F (36.2  C)-99.3  F (37.4  C)] 97.1  F (36.2  C)  Pulse:  [82-91] 91  Resp:  [16-18] 16  BP: ()/(56-69) 91/61  SpO2:  [96 %-97 %] 96 %    Alert and oriented  Dressing is clean, dry, and intact.   Moderate left thigh swelling present but compressible.   Moderate left knee effusion with mild global tenderness to palpation  Bilateral calves are soft, palpable cord of left calf with tenderness  Left lower extremity is NVI.  Sensation equal along bilateral lower extremities  Patient able to resist dorsi and plantar flexion bilaterally  +Dp pulse    Labs:  Recent Labs   Lab Test 07/25/25  0614 07/24/25  0646 07/22/25  1500 11/19/21  1746 11/19/21  1746 07/23/20  0206   WBC  --   --  8.2  --  8.1 11.5*   HGB 10.1* 10.3* 13.4   < > 14.7 15.5   *  --  189  --  210 197    < > = values in this interval not displayed.     Recent Labs   Lab Test 07/22/25  2115   INR 1.03       Plan:   Will obtain duplex ultrasound today to evaluate for possible DVT  Lovenox inpatient for DVT prophylaxis, ASA at discharge (does have hx of GI bleed but states can tolerate higher dose of ASA for short periods of time).     Mobilize with PT/OT    WBAT Left LE with walker.     Continue current pain regimen   Dressings: Keep intact.  Change if  >60% saturated or peeling off.    Follow-up: 2 weeks post-op with Dr Clark team    Disposition:    Anticipate d/c to TCU when medically cleared and progressing in PT.    Kristine Daniels PA-C     detailed exam

## 2025-07-25 NOTE — PLAN OF CARE
"Goal Outcome Evaluation:      Plan of Care Reviewed With: patient    Overall Patient Progress: improvingOverall Patient Progress: improving    Outcome Evaluation: VSS on RA. AOx4. Assist of 2 with SS. IV infusing. No PRNs this shift.      Problem: Adult Inpatient Plan of Care  Goal: Plan of Care Review  Description: The Plan of Care Review/Shift note should be completed every shift.  The Outcome Evaluation is a brief statement about your assessment that the patient is improving, declining, or no change.  This information will be displayed automatically on your shift  note.  Outcome: Progressing  Flowsheets (Taken 7/25/2025 0141)  Outcome Evaluation: VSS on RA. AOx4. Assist of 2 with SS. IV infusing. No PRNs this shift.  Plan of Care Reviewed With: patient  Overall Patient Progress: improving  Goal: Patient-Specific Goal (Individualized)  Description: You can add care plan individualizations to a care plan. Examples of Individualization might be:  \"Parent requests to be called daily at 9am for status\", \"I have a hard time hearing out of my right ear\", or \"Do not touch me to wake me up as it startles  me\".  Outcome: Progressing  Goal: Absence of Hospital-Acquired Illness or Injury  Outcome: Progressing  Intervention: Identify and Manage Fall Risk  Recent Flowsheet Documentation  Taken 7/25/2025 0005 by Dm Berry, RN  Safety Promotion/Fall Prevention:   activity supervised   clutter free environment maintained   increased rounding and observation   increase visualization of patient   lighting adjusted   mobility aid in reach   nonskid shoes/slippers when out of bed   patient and family education   safety round/check completed  Intervention: Prevent Skin Injury  Recent Flowsheet Documentation  Taken 7/25/2025 0005 by Dm Berry, RN  Body Position: supine, head elevated  Skin Protection: adhesive use limited  Intervention: Prevent and Manage VTE (Venous Thromboembolism) Risk  Recent Flowsheet " Documentation  Taken 7/25/2025 0005 by Dm Berry, RN  VTE Prevention/Management: SCDs on (sequential compression devices)  Intervention: Prevent Infection  Recent Flowsheet Documentation  Taken 7/25/2025 0005 by Dm Berry RN  Infection Prevention:   rest/sleep promoted   single patient room provided  Goal: Optimal Comfort and Wellbeing  Outcome: Progressing  Intervention: Monitor Pain and Promote Comfort  Recent Flowsheet Documentation  Taken 7/25/2025 0005 by Dm Berry RN  Pain Management Interventions:   care clustered   emotional support   quiet environment facilitated   rest  Intervention: Provide Person-Centered Care  Recent Flowsheet Documentation  Taken 7/25/2025 0005 by Dm Berry, RN  Trust Relationship/Rapport:   questions answered   questions encouraged   thoughts/feelings acknowledged   care explained  Goal: Readiness for Transition of Care  Outcome: Progressing

## 2025-07-26 LAB
MAGNESIUM SERPL-MCNC: 2 MG/DL (ref 1.7–2.3)
MCV RBC AUTO: 88 FL (ref 78–100)
PHOSPHATE SERPL-MCNC: 3.3 MG/DL (ref 2.5–4.5)
PLATELET # BLD AUTO: 159 10E3/UL (ref 150–450)
POTASSIUM SERPL-SCNC: 4.1 MMOL/L (ref 3.4–5.3)

## 2025-07-26 PROCEDURE — 84132 ASSAY OF SERUM POTASSIUM: CPT | Performed by: STUDENT IN AN ORGANIZED HEALTH CARE EDUCATION/TRAINING PROGRAM

## 2025-07-26 PROCEDURE — 120N000001 HC R&B MED SURG/OB

## 2025-07-26 PROCEDURE — 250N000011 HC RX IP 250 OP 636: Performed by: PHYSICIAN ASSISTANT

## 2025-07-26 PROCEDURE — 94660 CPAP INITIATION&MGMT: CPT

## 2025-07-26 PROCEDURE — 250N000013 HC RX MED GY IP 250 OP 250 PS 637: Performed by: STUDENT IN AN ORGANIZED HEALTH CARE EDUCATION/TRAINING PROGRAM

## 2025-07-26 PROCEDURE — 250N000013 HC RX MED GY IP 250 OP 250 PS 637: Performed by: INTERNAL MEDICINE

## 2025-07-26 PROCEDURE — 99231 SBSQ HOSP IP/OBS SF/LOW 25: CPT | Performed by: STUDENT IN AN ORGANIZED HEALTH CARE EDUCATION/TRAINING PROGRAM

## 2025-07-26 PROCEDURE — 84100 ASSAY OF PHOSPHORUS: CPT | Performed by: STUDENT IN AN ORGANIZED HEALTH CARE EDUCATION/TRAINING PROGRAM

## 2025-07-26 PROCEDURE — 999N000157 HC STATISTIC RCP TIME EA 10 MIN

## 2025-07-26 PROCEDURE — 36415 COLL VENOUS BLD VENIPUNCTURE: CPT | Performed by: STUDENT IN AN ORGANIZED HEALTH CARE EDUCATION/TRAINING PROGRAM

## 2025-07-26 PROCEDURE — 85049 AUTOMATED PLATELET COUNT: CPT | Performed by: PHYSICIAN ASSISTANT

## 2025-07-26 PROCEDURE — 83735 ASSAY OF MAGNESIUM: CPT | Performed by: STUDENT IN AN ORGANIZED HEALTH CARE EDUCATION/TRAINING PROGRAM

## 2025-07-26 RX ORDER — MAGNESIUM OXIDE 400 MG/1
400 TABLET ORAL EVERY 4 HOURS
Status: COMPLETED | OUTPATIENT
Start: 2025-07-26 | End: 2025-07-27

## 2025-07-26 RX ADMIN — METHOCARBAMOL 500 MG: 500 TABLET ORAL at 13:02

## 2025-07-26 RX ADMIN — METHOCARBAMOL 500 MG: 500 TABLET ORAL at 20:36

## 2025-07-26 RX ADMIN — Medication 400 MG: at 20:36

## 2025-07-26 RX ADMIN — SENNOSIDES AND DOCUSATE SODIUM 1 TABLET: 50; 8.6 TABLET ORAL at 20:36

## 2025-07-26 RX ADMIN — ACETAMINOPHEN 1000 MG: 500 TABLET, FILM COATED ORAL at 06:27

## 2025-07-26 RX ADMIN — PANTOPRAZOLE SODIUM 40 MG: 40 TABLET, DELAYED RELEASE ORAL at 09:58

## 2025-07-26 RX ADMIN — ACETAMINOPHEN 1000 MG: 500 TABLET, FILM COATED ORAL at 13:02

## 2025-07-26 RX ADMIN — SENNOSIDES AND DOCUSATE SODIUM 1 TABLET: 50; 8.6 TABLET ORAL at 09:58

## 2025-07-26 RX ADMIN — AMITRIPTYLINE HYDROCHLORIDE 75 MG: 25 TABLET, FILM COATED ORAL at 20:36

## 2025-07-26 RX ADMIN — ENOXAPARIN SODIUM 40 MG: 40 INJECTION SUBCUTANEOUS at 13:02

## 2025-07-26 RX ADMIN — PRAVASTATIN SODIUM 20 MG: 20 TABLET ORAL at 20:36

## 2025-07-26 RX ADMIN — FLUOXETINE HYDROCHLORIDE 40 MG: 20 CAPSULE ORAL at 09:58

## 2025-07-26 RX ADMIN — TAMSULOSIN HYDROCHLORIDE 0.4 MG: 0.4 CAPSULE ORAL at 20:36

## 2025-07-26 RX ADMIN — ACETAMINOPHEN 1000 MG: 500 TABLET, FILM COATED ORAL at 21:59

## 2025-07-26 ASSESSMENT — ACTIVITIES OF DAILY LIVING (ADL)
ADLS_ACUITY_SCORE: 54
ADLS_ACUITY_SCORE: 50
ADLS_ACUITY_SCORE: 54
ADLS_ACUITY_SCORE: 50
ADLS_ACUITY_SCORE: 54
ADLS_ACUITY_SCORE: 50
ADLS_ACUITY_SCORE: 54

## 2025-07-26 NOTE — PLAN OF CARE
"Goal Outcome Evaluation:      Plan of Care Reviewed With: patient    Overall Patient Progress: no changeOverall Patient Progress: no change    Outcome Evaluation: Discharge to TCU/ARU - SW following      A&Ox4  2A smiley steady  Recliner for supper  Pain managed with scheduled tylenol and robaxin  K, Mg, Phos protocol   US BLE pending       Problem: Adult Inpatient Plan of Care  Goal: Plan of Care Review  Description: The Plan of Care Review/Shift note should be completed every shift.  The Outcome Evaluation is a brief statement about your assessment that the patient is improving, declining, or no change.  This information will be displayed automatically on your shift  note.  Outcome: Not Progressing  Flowsheets (Taken 7/25/2025 1949)  Outcome Evaluation: Discharge to TCU/ARU - SW following  Plan of Care Reviewed With: patient  Overall Patient Progress: no change  Goal: Patient-Specific Goal (Individualized)  Description: You can add care plan individualizations to a care plan. Examples of Individualization might be:  \"Parent requests to be called daily at 9am for status\", \"I have a hard time hearing out of my right ear\", or \"Do not touch me to wake me up as it startles  me\".  Outcome: Not Progressing  Goal: Absence of Hospital-Acquired Illness or Injury  Outcome: Not Progressing  Intervention: Identify and Manage Fall Risk  Recent Flowsheet Documentation  Taken 7/25/2025 0900 by Reyes O'Connor, Bridget M, RN  Safety Promotion/Fall Prevention:   assistive device/personal items within reach   clutter free environment maintained   increased rounding and observation   increase visualization of patient   lighting adjusted   mobility aid in reach   nonskid shoes/slippers when out of bed   patient and family education   room organization consistent   safety round/check completed   supervised activity  Intervention: Prevent Skin Injury  Recent Flowsheet Documentation  Taken 7/25/2025 0900 by Reyes O'Connor, Bridget M, " RN  Body Position:   supine, head elevated   supine, legs elevated  Intervention: Prevent and Manage VTE (Venous Thromboembolism) Risk  Recent Flowsheet Documentation  Taken 7/25/2025 0900 by Reyes O'Connor, Bridget M, RN  VTE Prevention/Management: SCDs on (sequential compression devices)  Goal: Optimal Comfort and Wellbeing  Outcome: Not Progressing  Goal: Readiness for Transition of Care  Outcome: Not Progressing

## 2025-07-26 NOTE — PROGRESS NOTES
[facility]    Medicine Progress Note - Hospitalist Service  Date of Admission: 7/22/2025     Assessment & Plan         Leodan Yanez is a 74 year old male with past medical history significant for AAA, atrial fibrillation, hypertension, hyperlipidemia, BPH, MONSE, GERD, insomnia, neuropathy, glaucoma, and nephrolithiasis who presented to [facility] on 7/22/2025 after a fall and was found to have displaced intertrochanteric proximal left femur fracture s/p ORIF.    Comminuted, Displaced, Left, Intertrochanteric/Subtrochanteric, Proximal Femur Fracture s/p ORIF  Presented after a mechanical fall. Imaging with comminuted displaced intertrochanteric/subtrochanteric fracture of the proximal left femur. Moderate degenerative arthrosis of both hips. Degenerative changes of both SI joints and the lower lumbar spine. Orthopedic Surgery consulted and took patient to OR this morning for ORIF with intramedullary nail. Now doing well post-operatively. Continue pain control overnight. PT/OT consulted. Awaiting TCU placement.    Hypokalemia  Hypomagnesemia  Hypocalcemia  - replace per protocol  - hold Hctz     Chronic Medical Problems  Chronic Atrial Fibrillation - EKG with Afib, rate 61.  On ASA 81mg daily; not on anticoagulation; pt with hx of GI bleed.  Continue Toprol XL and hold ASA for surgery.   Chronic Peripheral Neuropathy - hx of BLE neuropathy with reported poor balance at baseline.  Uses a cane.  Continue Amitriptyline  HTN - continue Amlodipine and Toprol XL.  Hold Lisinopril and Hctz for surgery; blood pressures have remained in normotensive range so have not yet restarted.  HLD - continue Pravastatin  GERD - continue Protonix  BPH - continue Flomax  STEPHEN - continue Fluoxetine  Hx AAA - Mild ascending aorta dilatation (4.1 cm) noted on echo 2019.  MONSE - continue cpap  Obesity - hx Mirella en Y gastric bypass 2006        Clinically Significant Risk Factors                   #  "Hypertension: Noted on problem list            # Morbid Obesity: Estimated body mass index is 42.49 kg/m  as calculated from the following:    Height as of this encounter: 1.854 m (6' 1\").    Weight as of this encounter: 146.1 kg (322 lb 1.5 oz)., PRESENT ON ADMISSION     # Financial/Environmental Concerns: none           Diet: Orders Placed This Encounter      Regular Diet Adult     DVT Prophylaxis: lovenox while admitted; ASA on discharge  Linder: None  Code Status: Full Code    Medically Ready for Discharge: Ready Now; awaiting placement       The patient's care was discussed with the Bedside Nurse and Patient.  I personally spent 45 minutes on chart review, documentation, coordination, and bedside management of patient.    Feng Salomon MD, S  Hospitalist Service  [facility]  ______________________________________________________________________    Interval History   Nursing notes reviewed. Patient stated his arms are feeling sore either from fall or possibly related to increased use while can't use left leg as effectively.     A full 10+ point review of systems was performed and found to be negative with the exception of those items noted here.    Physical Exam   Temp: 98.1  F (36.7  C) Temp src: Temporal BP: 113/64 Pulse: 93   Resp: 18 SpO2: 95 % O2 Device: None (Room air) Oxygen Delivery: 10 LPM Height: 185.4 cm (6' 1\") Weight: (!) 146.1 kg (322 lb 1.5 oz)  Estimated body mass index is 42.49 kg/m  as calculated from the following:    Height as of this encounter: 1.854 m (6' 1\").    Weight as of this encounter: 146.1 kg (322 lb 1.5 oz).    General: Very pleasant male resting comfortably in bedside chair.  Awake, alert, interactive.  HEENT: Normocephalic, atraumatic.  PERRL, EOMI.  Conjunctiva clear, sclerae anicteric.  Mucous membranes moist  Respiratory: Normal work of breathing.    Skin: No rashes or abrasions on exposed skin.  Neurologic: Alert and oriented x4.  Cranial nerves II through " XII grossly intact.  Psychologic: Appropriate mood and affect.    Data   All laboratory results and other diagnostic data from the past 24 hours is available in Epic and has been personally reviewed.    Recent Labs   Lab 07/26/25  0558 07/25/25  0614 07/24/25  0646 07/23/25  0618 07/23/25  0349 07/22/25 2115 07/22/25 2032 07/22/25  1500   WBC  --   --   --   --   --   --   --  8.2   HGB  --  10.1* 10.3*  --   --   --   --  13.4   MCV 88 88  88 90  --   --   --   --  87    141*  --   --   --   --   --  189   INR  --   --   --   --   --  1.03  --   --    NA  --   --   --   --  138  --   --  144   POTASSIUM 4.1 3.9 4.0  --  4.0  --    < > 2.7*   CHLORIDE  --   --   --   --  100  --   --  114*   CO2  --   --   --   --  27  --   --  20*   BUN  --   --   --   --  18.2  --   --  13.9   CR  --   --   --   --  0.95  --   --  0.76   ANIONGAP  --   --   --   --  11  --   --  10   KELLY  --   --   --   --  8.7*  --   --  6.0*   GLC  --   --   --  96 133*  --   --  101*    < > = values in this interval not displayed.       Imaging results reviewed over the past 24 hrs:   Recent Results (from the past 24 hours)   US Lower Extremity Venous Duplex Left    Narrative    EXAM: US LOWER EXTREMITY VENOUS DUPLEX LEFT  LOCATION: [facility]  DATE: 7/25/2025    INDICATION: check for DVT status post surgical procedure LLE  COMPARISON: None.  TECHNIQUE: Venous Duplex ultrasound of the left lower extremity with and without compression, augmentation and duplex. Color flow and spectral Doppler with waveform analysis performed.    FINDINGS: Exam includes the common femoral, femoral, popliteal, and contralateral common femoral veins as well as segmentally visualized deep calf veins and greater saphenous vein.     LEFT: No deep vein thrombosis. No superficial thrombophlebitis. No popliteal cyst. Small left knee effusion. Edema about the left lower extremity      Impression    IMPRESSION:  1.  No deep venous thrombosis  in the left lower extremity.    2.  Small left knee effusion.       I personally reviewed: no images or EKG's today.

## 2025-07-26 NOTE — PLAN OF CARE
"Goal Outcome Evaluation:      Plan of Care Reviewed With: patient    Overall Patient Progress: no changeOverall Patient Progress: no change    Outcome Evaluation: CPAP in use overnight, given apap for pain, no acute events overnight    Pt A/Ox4, VSS, on RA, CPAP in use overnight, no IV access, care team aware, Ax2 smiley steady/lift, given PRN PO apap for pain, tolerating a regular diet, voiding via urinal, plan for TCU at discharge, referrals sent, awaiting placement    Problem: Adult Inpatient Plan of Care  Goal: Plan of Care Review  Description: The Plan of Care Review/Shift note should be completed every shift.  The Outcome Evaluation is a brief statement about your assessment that the patient is improving, declining, or no change.  This information will be displayed automatically on your shift  note.  7/26/2025 0709 by Hazel Miller, RN  Outcome: Progressing  Flowsheets (Taken 7/26/2025 0709)  Outcome Evaluation: CPAP in use overnight, given apap for pain, no acute events overnight  Plan of Care Reviewed With: patient  Overall Patient Progress: no change  7/26/2025 0459 by Hazel Miller, RN  Outcome: Progressing  Goal: Patient-Specific Goal (Individualized)  Description: You can add care plan individualizations to a care plan. Examples of Individualization might be:  \"Parent requests to be called daily at 9am for status\", \"I have a hard time hearing out of my right ear\", or \"Do not touch me to wake me up as it startles  me\".  7/26/2025 0709 by Hazel Miller, RN  Outcome: Progressing  7/26/2025 0459 by Hazel Miller, RN  Outcome: Progressing  Goal: Absence of Hospital-Acquired Illness or Injury  7/26/2025 0709 by Hazel Miller, RN  Outcome: Progressing  7/26/2025 0459 by Hazel Miller, RN  Outcome: Progressing  Intervention: Identify and Manage Fall Risk  Recent Flowsheet Documentation  Taken 7/25/2025 2138 by Hazel Miller, RN  Safety Promotion/Fall Prevention:   activity supervised   assistive " device/personal items within reach   clutter free environment maintained   nonskid shoes/slippers when out of bed   room near nurse's station   room organization consistent   safety round/check completed   supervised activity  Intervention: Prevent Skin Injury  Recent Flowsheet Documentation  Taken 7/25/2025 2138 by Hazel Miller RN  Skin Protection:   adhesive use limited   incontinence pads utilized  Intervention: Prevent and Manage VTE (Venous Thromboembolism) Risk  Recent Flowsheet Documentation  Taken 7/25/2025 2138 by Hazel Miller RN  VTE Prevention/Management: SCDs on (sequential compression devices)  Intervention: Prevent Infection  Recent Flowsheet Documentation  Taken 7/26/2025 0021 by Hazel Miller RN  Infection Prevention:   environmental surveillance performed   rest/sleep promoted   single patient room provided  Taken 7/25/2025 2138 by Hazel Miller RN  Infection Prevention:   environmental surveillance performed   rest/sleep promoted   single patient room provided  Goal: Optimal Comfort and Wellbeing  7/26/2025 0709 by Hazel Miller RN  Outcome: Progressing  7/26/2025 0459 by Hazel Miller RN  Outcome: Progressing  Intervention: Monitor Pain and Promote Comfort  Recent Flowsheet Documentation  Taken 7/25/2025 2138 by Hazel Miller RN  Pain Management Interventions:   medication (see MAR)   care clustered   quiet environment facilitated   rest  Intervention: Provide Person-Centered Care  Recent Flowsheet Documentation  Taken 7/25/2025 2138 by Hazel Miller RN  Trust Relationship/Rapport:   questions answered   questions encouraged   thoughts/feelings acknowledged   care explained  Goal: Readiness for Transition of Care  7/26/2025 0709 by Hazel Miller RN  Outcome: Progressing  7/26/2025 0459 by Hazel Miller RN  Outcome: Progressing     Problem: Delirium  Goal: Optimal Coping  7/26/2025 0709 by Hazel Miller RN  Outcome: Progressing  7/26/2025 0459 by Hazel Miller RN  Outcome:  Progressing  Intervention: Optimize Psychosocial Adjustment to Delirium  Recent Flowsheet Documentation  Taken 7/25/2025 2138 by Hazel Miller RN  Family/Support System Care: self-care encouraged  Goal: Improved Behavioral Control  7/26/2025 0709 by Hazel Miller RN  Outcome: Progressing  7/26/2025 0459 by Hazel Miller RN  Outcome: Progressing  Intervention: Minimize Safety Risk  Recent Flowsheet Documentation  Taken 7/25/2025 2138 by Hazel Miller RN  Enhanced Safety Measures:   assistive devices when indicated   pain management   room near unit station  Trust Relationship/Rapport:   questions answered   questions encouraged   thoughts/feelings acknowledged   care explained  Goal: Improved Attention and Thought Clarity  7/26/2025 0709 by Hazel Miller RN  Outcome: Progressing  7/26/2025 0459 by Hazel Miller RN  Outcome: Progressing  Goal: Improved Sleep  7/26/2025 0709 by Hazel Miller RN  Outcome: Progressing  7/26/2025 0459 by Hazel Miller RN  Outcome: Progressing     Problem: Hip Fracture Surgical Repair  Goal: Optimal Coping with Change in Health Status  7/26/2025 0709 by Hazel Miller RN  Outcome: Progressing  7/26/2025 0459 by Hazel Miller RN  Outcome: Progressing  Intervention: Support Psychosocial Response to Surgery and Mobility Changes  Recent Flowsheet Documentation  Taken 7/25/2025 2138 by Hazel Miller RN  Family/Support System Care: self-care encouraged  Goal: Absence of Bleeding  7/26/2025 0709 by Hazel Miller RN  Outcome: Progressing  7/26/2025 0459 by Hazel Miller RN  Outcome: Progressing  Intervention: Monitor and Manage Bleeding  Recent Flowsheet Documentation  Taken 7/25/2025 2138 by Hazel Miller RN  Bleeding Management: dressing monitored  Goal: Effective Bowel Elimination  7/26/2025 0709 by Hazel Miller RN  Outcome: Progressing  7/26/2025 0459 by Hazel Miller RN  Outcome: Progressing  Intervention: Enhance Bowel Motility and Elimination  Recent  Flowsheet Documentation  Taken 7/25/2025 2138 by Hazel Miller RN  Bowel Motility Enhancement: fluid intake encouraged  Bowel Elimination Management: relaxation techniques promoted  Bowel Elimination Promotion:   adequate fluid intake promoted   ambulation promoted  Goal: Cognitive Function Maintained  7/26/2025 0709 by Hazel Miller RN  Outcome: Progressing  7/26/2025 0459 by Hazel Miller RN  Outcome: Progressing  Goal: Fluid and Electrolyte Balance  7/26/2025 0709 by Hazel Miller RN  Outcome: Progressing  7/26/2025 0459 by Hazel Miller RN  Outcome: Progressing  Goal: Absence of Infection Signs and Symptoms  7/26/2025 0709 by Hazel Miller RN  Outcome: Progressing  7/26/2025 0459 by Hazel Miller RN  Outcome: Progressing  Goal: Optimal Functional Ability  7/26/2025 0709 by Hazel Miller RN  Outcome: Progressing  7/26/2025 0459 by Hazel Miller RN  Outcome: Progressing  Intervention: Protect Joint Integrity  Recent Flowsheet Documentation  Taken 7/25/2025 2138 by Hazel Miller RN  Equipment: ice  Goal: Anesthesia/Sedation Recovery  7/26/2025 0709 by Hazel Miller RN  Outcome: Progressing  7/26/2025 0459 by Hazel Miller RN  Outcome: Progressing  Intervention: Optimize Anesthesia Recovery  Recent Flowsheet Documentation  Taken 7/25/2025 2138 by Hazel Miller RN  Safety Promotion/Fall Prevention:   activity supervised   assistive device/personal items within reach   clutter free environment maintained   nonskid shoes/slippers when out of bed   room near nurse's station   room organization consistent   safety round/check completed   supervised activity  Goal: Optimal Pain Control and Function  7/26/2025 0709 by Hazel Miller RN  Outcome: Progressing  7/26/2025 0459 by Hazel Miller RN  Outcome: Progressing  Intervention: Prevent or Manage Pain  Recent Flowsheet Documentation  Taken 7/25/2025 2138 by Hazel Miller RN  Pain Management Interventions:   medication (see MAR)   care  clustered   quiet environment facilitated   rest  Goal: Nausea and Vomiting Relief  7/26/2025 0709 by Hazel Miller, RN  Outcome: Progressing  7/26/2025 0459 by Hazel Miller RN  Outcome: Progressing  Intervention: Prevent or Manage Nausea and Vomiting  Recent Flowsheet Documentation  Taken 7/25/2025 2138 by Hazel Miller RN  Nausea/Vomiting Interventions: (denies) other (see comments)  Goal: Effective Urinary Elimination  7/26/2025 0709 by Hazel Miller RN  Outcome: Progressing  7/26/2025 0459 by Hazel Miller RN  Outcome: Progressing  Intervention: Monitor and Manage Urinary Retention  Recent Flowsheet Documentation  Taken 7/25/2025 2138 by Hazel Miller RN  Urinary Elimination Promotion:   absorbent pad/diaper use encouraged   positioned for ease of voiding   voiding relaxation promoted  Goal: Effective Oxygenation and Ventilation  7/26/2025 0709 by Hazel Miller RN  Outcome: Progressing  7/26/2025 0459 by Hazel Miller RN  Outcome: Progressing

## 2025-07-26 NOTE — PROGRESS NOTES
Orthopedic Surgery  Leodan Yanez  07/26/2025     Admit Date:  7/22/2025  Assessment:   POD: 3 Days Post-Op   Procedure(s):  OPEN REDUCTION INTERNAL FIXATION, FRACTURE, FEMUR, USING INTRAMEDULLARY HERB     Patient resting comfortably in bed.    Pain controlled. Leodan tells me he is having issues with muscle spasms and cramping in the left thigh, hamstrings, and calf. He states he is not ambulating as he feels very weak and is afraid he will fall.   Tolerating oral intake.    Denies nausea or vomiting  Denies chest pain or shortness of breath  Denies numbness and tingling to bilateral lower extremities    Temp:  [97.2  F (36.2  C)-99.3  F (37.4  C)] 98.1  F (36.7  C)  Pulse:  [92-99] 93  Resp:  [16-18] 18  BP: ()/(56-76) 120/62  FiO2 (%):  [21 %] 21 %  SpO2:  [95 %-98 %] 95 %    Alert and oriented  Dressing is clean, dry, and intact.   Moderate left thigh swelling with left knee effusion; moderate tenderness to palpation about the medial and lateral joint lines of the left knee with mild tenderness to palpation about the anterior and posterior left knee.  Bilateral calves are soft; palpable cord in left calf with moderate tenderness to palpation  Left lower extremity is NVI.  Sensation diminished but equal along bilateral lower extremities  Patient able to resist dorsi and plantar flexion bilaterally  +Dp pulse    Labs:  Recent Labs   Lab Test 07/26/25  0558 07/25/25  0614 07/24/25  0646 07/22/25  1500 11/19/21  1746 11/19/21  1746 07/23/20  0206   WBC  --   --   --  8.2  --  8.1 11.5*   HGB  --  10.1* 10.3* 13.4   < > 14.7 15.5    141*  --  189   < > 210 197    < > = values in this interval not displayed.     Recent Labs   Lab Test 07/22/25  2115   INR 1.03         Plan:   Given patient's pain and palpable cord in left calf, duplex ultrasound was ordered and completed yesterday for possible DVT. Thankfully, ultrasound was negative for deep venous thrombosis in the left lower extremity. A small left  knee effusion was noted. Explained to the patient that this is very reassuring.  Continue Lovenox for DVT prophylaxis while in hospital. History of GI bleed, but plan to discharge on 81 mg ASA BID x6 weeks at discharge. Patient should take omeprazole 40 mg daily while taking aspirin for GI bleed risk reduction.     Mobilize with PT/OT    WBAT LLE with walker.     Continue current pain regimen.   Dressings: Keep intact.  Change if >60% saturated or peeling off.    Follow-up: 2 weeks post-op with Dr. Billy Clark's team    Disposition:    Anticipate d/c to TCU when medically cleared and progressing in PT. He is awaiting TCU placement currently. Per note on 7/25/2025 @ 1551, [facility] is considering accepting patient but would prefer patient has documented successful ambulation prior to acceptance. They are considering ARU referral to [facility].    Иван Sargent PA-C

## 2025-07-26 NOTE — PROVIDER NOTIFICATION
07/25/25 1687   Tech Time   $Tech Time (10 minute increments) 3   Mode: CPAP/ BiPAP/ AVAPS/ AVAPS AE   CPAP/BiPAP/ AVAPS/ AVAPS AE Mode CPAP   Equipment   Device Resmed   CPAP/BiPAP/Settings   BIPAP/CPAP On Standby On   Oxygen (%) 21   CPAP/BiPAP Patient Parameters   CPAP (cm H2O) 14 cmh2o   RR Total (breaths/min) 16 breaths/min   CPAP/BiPAP/AVAPS/AVAPS AE Alarms   Humidifier Checked N/A   RT Device Skin Assessment   Oxygen Delivery Device CPAP/BiPAP Mask   Interface Face Mask - Large   Ventilator Arm In Place No   Site Appearance neck circumference Clean and dry   Site Appearance bridge of nose Clean and dry   Site Appearance occiput Clean and dry   Strap Tightness Finger Allowance between head and device strap   Device Skin Interventions Taken No adjustments needed   Respiratory WDL   Respiratory WDL X   Rhythm/Pattern, Respiratory assisted mechanically   Expansion/Accessory Muscles/Retractions expansion symmetric;no retractions;no use of accessory muscles     Mukul Kwong, RT on 7/25/2025 at 11:06 PM

## 2025-07-27 LAB
ANION GAP SERPL CALCULATED.3IONS-SCNC: 9 MMOL/L (ref 7–15)
BUN SERPL-MCNC: 14.1 MG/DL (ref 8–23)
CALCIUM SERPL-MCNC: 7.9 MG/DL (ref 8.8–10.4)
CHLORIDE SERPL-SCNC: 103 MMOL/L (ref 98–107)
CREAT SERPL-MCNC: 0.78 MG/DL (ref 0.67–1.17)
EGFRCR SERPLBLD CKD-EPI 2021: >90 ML/MIN/1.73M2
GLUCOSE SERPL-MCNC: 119 MG/DL (ref 70–99)
HCO3 SERPL-SCNC: 26 MMOL/L (ref 22–29)
HGB BLD-MCNC: 9.6 G/DL (ref 13.3–17.7)
MAGNESIUM SERPL-MCNC: 2.1 MG/DL (ref 1.7–2.3)
MCV RBC AUTO: 88 FL (ref 78–100)
MCV RBC AUTO: 88 FL (ref 78–100)
PHOSPHATE SERPL-MCNC: 3.8 MG/DL (ref 2.5–4.5)
PLATELET # BLD AUTO: 181 10E3/UL (ref 150–450)
POTASSIUM SERPL-SCNC: 4.2 MMOL/L (ref 3.4–5.3)
POTASSIUM SERPL-SCNC: 4.3 MMOL/L (ref 3.4–5.3)
SODIUM SERPL-SCNC: 138 MMOL/L (ref 135–145)

## 2025-07-27 PROCEDURE — 999N000157 HC STATISTIC RCP TIME EA 10 MIN

## 2025-07-27 PROCEDURE — 94660 CPAP INITIATION&MGMT: CPT

## 2025-07-27 PROCEDURE — 80048 BASIC METABOLIC PNL TOTAL CA: CPT | Performed by: INTERNAL MEDICINE

## 2025-07-27 PROCEDURE — 83735 ASSAY OF MAGNESIUM: CPT | Performed by: STUDENT IN AN ORGANIZED HEALTH CARE EDUCATION/TRAINING PROGRAM

## 2025-07-27 PROCEDURE — 99232 SBSQ HOSP IP/OBS MODERATE 35: CPT | Performed by: INTERNAL MEDICINE

## 2025-07-27 PROCEDURE — 250N000011 HC RX IP 250 OP 636: Performed by: PHYSICIAN ASSISTANT

## 2025-07-27 PROCEDURE — 85018 HEMOGLOBIN: CPT | Performed by: INTERNAL MEDICINE

## 2025-07-27 PROCEDURE — 250N000013 HC RX MED GY IP 250 OP 250 PS 637: Performed by: STUDENT IN AN ORGANIZED HEALTH CARE EDUCATION/TRAINING PROGRAM

## 2025-07-27 PROCEDURE — 250N000013 HC RX MED GY IP 250 OP 250 PS 637: Performed by: INTERNAL MEDICINE

## 2025-07-27 PROCEDURE — 85049 AUTOMATED PLATELET COUNT: CPT | Performed by: PHYSICIAN ASSISTANT

## 2025-07-27 PROCEDURE — 84132 ASSAY OF SERUM POTASSIUM: CPT | Performed by: STUDENT IN AN ORGANIZED HEALTH CARE EDUCATION/TRAINING PROGRAM

## 2025-07-27 PROCEDURE — 120N000001 HC R&B MED SURG/OB

## 2025-07-27 PROCEDURE — 36415 COLL VENOUS BLD VENIPUNCTURE: CPT | Performed by: STUDENT IN AN ORGANIZED HEALTH CARE EDUCATION/TRAINING PROGRAM

## 2025-07-27 PROCEDURE — 84100 ASSAY OF PHOSPHORUS: CPT | Performed by: STUDENT IN AN ORGANIZED HEALTH CARE EDUCATION/TRAINING PROGRAM

## 2025-07-27 RX ORDER — METOPROLOL SUCCINATE 25 MG/1
25 TABLET, EXTENDED RELEASE ORAL 2 TIMES DAILY
Status: DISCONTINUED | OUTPATIENT
Start: 2025-07-27 | End: 2025-07-31 | Stop reason: HOSPADM

## 2025-07-27 RX ORDER — AMLODIPINE BESYLATE 5 MG/1
5 TABLET ORAL DAILY
Status: DISCONTINUED | OUTPATIENT
Start: 2025-07-28 | End: 2025-07-28

## 2025-07-27 RX ADMIN — PANTOPRAZOLE SODIUM 40 MG: 40 TABLET, DELAYED RELEASE ORAL at 10:23

## 2025-07-27 RX ADMIN — PRAVASTATIN SODIUM 20 MG: 20 TABLET ORAL at 21:11

## 2025-07-27 RX ADMIN — TAMSULOSIN HYDROCHLORIDE 0.4 MG: 0.4 CAPSULE ORAL at 21:11

## 2025-07-27 RX ADMIN — SENNOSIDES AND DOCUSATE SODIUM 1 TABLET: 50; 8.6 TABLET ORAL at 10:23

## 2025-07-27 RX ADMIN — METHOCARBAMOL 500 MG: 500 TABLET ORAL at 13:44

## 2025-07-27 RX ADMIN — AMITRIPTYLINE HYDROCHLORIDE 75 MG: 25 TABLET, FILM COATED ORAL at 21:11

## 2025-07-27 RX ADMIN — Medication 400 MG: at 00:25

## 2025-07-27 RX ADMIN — ACETAMINOPHEN 1000 MG: 500 TABLET, FILM COATED ORAL at 06:13

## 2025-07-27 RX ADMIN — ACETAMINOPHEN 1000 MG: 500 TABLET, FILM COATED ORAL at 21:12

## 2025-07-27 RX ADMIN — FLUOXETINE HYDROCHLORIDE 40 MG: 20 CAPSULE ORAL at 10:23

## 2025-07-27 RX ADMIN — ENOXAPARIN SODIUM 40 MG: 40 INJECTION SUBCUTANEOUS at 13:44

## 2025-07-27 RX ADMIN — METHOCARBAMOL 500 MG: 500 TABLET ORAL at 06:13

## 2025-07-27 RX ADMIN — METHOCARBAMOL 500 MG: 500 TABLET ORAL at 21:11

## 2025-07-27 RX ADMIN — SENNOSIDES AND DOCUSATE SODIUM 1 TABLET: 50; 8.6 TABLET ORAL at 21:11

## 2025-07-27 RX ADMIN — METOPROLOL SUCCINATE 12.5 MG: 25 TABLET, EXTENDED RELEASE ORAL at 13:44

## 2025-07-27 RX ADMIN — ACETAMINOPHEN 1000 MG: 500 TABLET, FILM COATED ORAL at 13:44

## 2025-07-27 RX ADMIN — METOPROLOL SUCCINATE 25 MG: 25 TABLET, EXTENDED RELEASE ORAL at 21:11

## 2025-07-27 ASSESSMENT — ACTIVITIES OF DAILY LIVING (ADL)
ADLS_ACUITY_SCORE: 48
ADLS_ACUITY_SCORE: 50
ADLS_ACUITY_SCORE: 48
ADLS_ACUITY_SCORE: 50
ADLS_ACUITY_SCORE: 48
ADLS_ACUITY_SCORE: 49
ADLS_ACUITY_SCORE: 50
ADLS_ACUITY_SCORE: 50
ADLS_ACUITY_SCORE: 49
ADLS_ACUITY_SCORE: 50
ADLS_ACUITY_SCORE: 48
ADLS_ACUITY_SCORE: 49
ADLS_ACUITY_SCORE: 50

## 2025-07-27 NOTE — PROGRESS NOTES
[facility]    Medicine Progress Note - Hospitalist Service    Date of Admission:  7/22/2025    Assessment & Plan   Leodan Yanez is a 74 year old male with past medical history significant for AAA, atrial fibrillation, hypertension, hyperlipidemia, BPH, MONSE, GERD, insomnia, neuropathy, glaucoma, and nephrolithiasis who presented to [facility] on 7/22/2025 after a fall and was found to have displaced intertrochanteric proximal left femur fracture s/p ORIF.     Comminuted, Displaced, Left, Intertrochanteric/Subtrochanteric, Proximal Femur Fracture s/p ORIF  Acute blood loss anemia related to surgery:  Presented after a mechanical fall. Imaging with comminuted displaced intertrochanteric/subtrochanteric fracture of the proximal left femur. Moderate degenerative arthrosis of both hips. Degenerative changes of both SI joints and the lower lumbar spine. Orthopedic Surgery consulted and took patient to OR this morning for ORIF with intramedullary nail.     -No overt ongoing bleeding, no indication for current transfusion.  -Now doing well post-operatively. Post-op site cares, activity restrictions, pain medications, DVT proph per orthopedics.   PT/OT consulted. Awaiting TCU vs ARU placement.  SW following, patient did well today with activity.     Hypokalemia  Hypomagnesemia  Hypocalcemia:  - replace per protocol, improved now since admission  - hold Hctz     Chronic Medical Problems  Chronic Parox Atrial Fibrillation  HTN:  -On ASA PTA 81mg daily; not on anticoagulation otherwise.  Pt with hx of GI bleed.    -BP low normal post-op.  BP meds held.  BP trending up today.  Have resumed partial dose metoprolol today, only 12.5 mg BID (Baseline on 200 mg).   Hold norvasc, favor metoprolol before adding back norvasc.    ADDENDUM:  BP further improved as day has progressed, increasing this evening to 25 mg BID.  Likely can titrate up further tomorrow.  Keep norvasc on hold.      Chronic  "Peripheral Neuropathy - hx of BLE neuropathy with reported poor balance at baseline.  Uses a cane.  Continue Amitriptyline    HLD - continue Pravastatin    GERD - continue Protonix    BPH - continue Flomax    STEPHEN - continue Fluoxetine    Hx AAA - Mild ascending aorta dilatation (4.1 cm) noted on echo 2019.  Outpt monitoring.    MONSE - continue cpap    Obesity - hx Mirella en Y gastric bypass 2006    PPE Used:  Mask          Diet: Regular Diet Adult    DVT Prophylaxis: ASA per orthopedics  Linder Catheter: Not present  Lines: None     Cardiac Monitoring: None  Code Status: Full Code      Clinically Significant Risk Factors                   # Hypertension: Noted on problem list            # Morbid Obesity: Estimated body mass index is 42.49 kg/m  as calculated from the following:    Height as of this encounter: 1.854 m (6' 1\").    Weight as of this encounter: 146.1 kg (322 lb 1.5 oz).      # Financial/Environmental Concerns: none         Disposition Plan     Medically Ready for Discharge: Anticipated Tomorrow or day after (TCU vs ARU)     Samuel Eckert,   Hospitalist Service  [facility]  Securely message with [employer] (more info)  Text page via [employer] Paging/Directory   ______________________________________________________________________    Interval History   Assumed hospitalist care, history reviewed.  Feeling well this AM.  No major new complaints.  Pain controlled, looking forward to trying more activity.    Physical Exam   Vital Signs: Temp: 97.1  F (36.2  C) Temp src: Temporal BP: 120/79 Pulse: 93   Resp: 16 SpO2: 95 % O2 Device: None (Room air)    Weight: 322 lbs 1.47 oz    GEN:  Alert, oriented, appears comfortable, no overt distress.  HEENT:  Normocephalic/atraumatic, no scleral icterus, no nasal discharge, mouth moist.  CV:  Irreg Irreg with rate 80's, distant.  LUNGS:  Clear to auscultation bilaterally without rales/rhonchi/wheezing/retractions.  Symmetric chest rise on inhalation " noted.  ABD:  Active bowel sounds, soft, non-tender/non-distended.  No guarding/rigidity.  EXT:  No edema.  No cyanosis.  No new joint synovitis noted.  SKIN:  Dry to touch, no new exanthems noted in the visualized areas.    Medical Decision Making       45 MINUTES SPENT BY ME on the date of service doing chart review, history, exam, documentation & further activities per the note.      Data   Medications   Current Facility-Administered Medications   Medication Dose Route Frequency Provider Last Rate Last Admin     Current Facility-Administered Medications   Medication Dose Route Frequency Provider Last Rate Last Admin    acetaminophen (TYLENOL) tablet 1,000 mg  1,000 mg Oral Q8H Babak Ball MD   1,000 mg at 07/27/25 0613    amitriptyline (ELAVIL) tablet 75 mg  75 mg Oral At Bedtime Babak Ball MD   75 mg at 07/26/25 2036    [Held by provider] amLODIPine (NORVASC) tablet 5 mg  5 mg Oral Daily Samuel Eckert DO        enoxaparin ANTICOAGULANT (LOVENOX) injection 40 mg  40 mg Subcutaneous Q24H Yakelin Helm PA-C   40 mg at 07/26/25 1302    FLUoxetine (PROzac) capsule 40 mg  40 mg Oral QAM Babak Ball MD   40 mg at 07/26/25 0958    metoprolol succinate ER (TOPROL-XL) 24 hr half-tab 12.5 mg  12.5 mg Oral Daily Samuel Eckert DO        pantoprazole (PROTONIX) EC tablet 40 mg  40 mg Oral Daily Babak Ball MD   40 mg at 07/26/25 0958    pravastatin (PRAVACHOL) tablet 20 mg  20 mg Oral At Bedtime Babak Ball MD   20 mg at 07/26/25 2036    senna-docusate (SENOKOT-S/PERICOLACE) 8.6-50 MG per tablet 1 tablet  1 tablet Oral BID Babak Ball MD   1 tablet at 07/26/25 2036    Or    senna-docusate (SENOKOT-S/PERICOLACE) 8.6-50 MG per tablet 2 tablet  2 tablet Oral BID Babak Ball MD        sodium chloride (PF) 0.9% PF flush 3 mL  3 mL Intracatheter Q8H [facility] Babak Ball MD   3 mL at 07/24/25 2112    tamsulosin (FLOMAX) capsule 0.4 mg  0.4 mg Oral  QPM Babak Ball MD   0.4 mg at 07/26/25 2036     Labs and Imaging results below reviewed today.  Recent Labs   Lab 07/27/25  0741 07/26/25  0558 07/25/25  0614 07/24/25  0646 07/22/25  1500   WBC  --   --   --   --  8.2   HGB 9.6*  --  10.1* 10.3* 13.4   HCT  --   --   --   --  40.3   MCV 88  88 88 88  88 90 87    159 141*  --  189     Recent Labs   Lab 07/27/25  0741 07/26/25  0558 07/25/25  0614 07/24/25  0646 07/23/25  0618 07/23/25 0349 07/22/25 2032 07/22/25  1500     --   --   --   --  138  --  144   POTASSIUM 4.2  4.3 4.1 3.9   < >  --  4.0   < > 2.7*   CHLORIDE 103  --   --   --   --  100  --  114*   CO2 26  --   --   --   --  27  --  20*   ANIONGAP 9  --   --   --   --  11  --  10   *  --   --   --  96 133*  --  101*   BUN 14.1  --   --   --   --  18.2  --  13.9   CR 0.78  --   --   --   --  0.95  --  0.76   GFRESTIMATED >90  --   --   --   --  85  --  >90   KELLY 7.9*  --   --   --   --  8.7*  --  6.0*   MAG 2.1 2.0 2.1   < >  --  2.2   < > 1.3*   PHOS 3.8 3.3 3.0   < >  --  3.7  --  3.0    < > = values in this interval not displayed.     No results found for this or any previous visit (from the past 24 hours).

## 2025-07-27 NOTE — PLAN OF CARE
"Goal Outcome Evaluation:      Plan of Care Reviewed With: patient    Overall Patient Progress: improvingOverall Patient Progress: improving    Outcome Evaluation: Discharge TCU/ARU - pending placement      2A smiley-steady  No IV access  Denies pain - endorses \"heavy\" feeling that is relieved with Robaxin  K, Mg, Phos Protocols  Morning BP meds held       Problem: Adult Inpatient Plan of Care  Goal: Plan of Care Review  Description: The Plan of Care Review/Shift note should be completed every shift.  The Outcome Evaluation is a brief statement about your assessment that the patient is improving, declining, or no change.  This information will be displayed automatically on your shift  note.  Outcome: Progressing  Flowsheets (Taken 7/26/2025 1943)  Outcome Evaluation: Discharge TCU/ARU - pending placement  Plan of Care Reviewed With: patient  Overall Patient Progress: improving  Goal: Patient-Specific Goal (Individualized)  Description: You can add care plan individualizations to a care plan. Examples of Individualization might be:  \"Parent requests to be called daily at 9am for status\", \"I have a hard time hearing out of my right ear\", or \"Do not touch me to wake me up as it startles  me\".  Outcome: Progressing  Goal: Absence of Hospital-Acquired Illness or Injury  Outcome: Progressing  Intervention: Identify and Manage Fall Risk  Recent Flowsheet Documentation  Taken 7/26/2025 1000 by Reyes O'Connor, Bridget M, RN  Safety Promotion/Fall Prevention:   assistive device/personal items within reach   clutter free environment maintained   increased rounding and observation   increase visualization of patient   lighting adjusted   mobility aid in reach   nonskid shoes/slippers when out of bed   patient and family education   room organization consistent   safety round/check completed   supervised activity  Intervention: Prevent Skin Injury  Recent Flowsheet Documentation  Taken 7/26/2025 1000 by Reyes O'Connor, Bridget M, " RN  Body Position:   supine, head elevated   supine, legs elevated  Intervention: Prevent and Manage VTE (Venous Thromboembolism) Risk  Recent Flowsheet Documentation  Taken 7/26/2025 1000 by Reyes O'Connor, Bridget M, RN  VTE Prevention/Management: SCDs on (sequential compression devices)  Goal: Optimal Comfort and Wellbeing  Outcome: Progressing  Goal: Readiness for Transition of Care  Outcome: Progressing

## 2025-07-27 NOTE — PROVIDER NOTIFICATION
07/27/25 0032   Tech Time   $Tech Time (10 minute increments) 3   Mode: CPAP/ BiPAP/ AVAPS/ AVAPS AE   CPAP/BiPAP/ AVAPS/ AVAPS AE Mode CPAP   Equipment   Device Resmed   CPAP/BiPAP/Settings   $CPAP/BiPAP Subsequent completed   BIPAP/CPAP On Standby On   Oxygen (%) 21   CPAP/BiPAP Patient Parameters   CPAP (cm H2O) 14 cmh2o   RR Total (breaths/min) 16 breaths/min   CPAP/BiPAP/AVAPS/AVAPS AE Alarms   Humidifier Checked N/A   RT Device Skin Assessment   Oxygen Delivery Device CPAP/BiPAP Mask   Interface Face Mask - Large   Ventilator Arm In Place No   Site Appearance neck circumference Clean and dry   Site Appearance bridge of nose Clean and dry   Site Appearance occiput Clean and dry   Strap Tightness Finger Allowance between head and device strap   Device Skin Interventions Taken No adjustments needed   Respiratory WDL   Respiratory WDL X   Rhythm/Pattern, Respiratory assisted mechanically   Expansion/Accessory Muscles/Retractions expansion symmetric;no retractions;no use of accessory muscles     Mukul Kwong, RT on 7/27/2025 at 12:33 AM

## 2025-07-27 NOTE — PLAN OF CARE
"Goal Outcome Evaluation:      Plan of Care Reviewed With: patient    Overall Patient Progress: no changeOverall Patient Progress: no change    Outcome Evaluation: Given apap and robaxin for pain, plan for continued PT, pending TCU placement    Patient vital signs are at baseline: Yes  Patient able to ambulate as they were prior to admission or with assist devices provided by therapies during their stay:  No,  Reason:  Ax2 smiley steady, PT goal to bear weight with ambulation  Patient MUST void prior to discharge:  Yes  Patient able to tolerate oral intake:  Yes  Pain has adequate pain control using Oral analgesics:  Yes  Does patient have an identified :  Yes  Has goal D/C date and time been discussed with patient:  No,  Reason:  pending TCU placement, referrals sent     Problem: Adult Inpatient Plan of Care  Goal: Plan of Care Review  Description: The Plan of Care Review/Shift note should be completed every shift.  The Outcome Evaluation is a brief statement about your assessment that the patient is improving, declining, or no change.  This information will be displayed automatically on your shift  note.  Outcome: Progressing  Flowsheets (Taken 7/27/2025 0641)  Outcome Evaluation: Given apap and robaxin for pain, plan for continued PT, pending TCU placement  Plan of Care Reviewed With: patient  Overall Patient Progress: no change  Goal: Patient-Specific Goal (Individualized)  Description: You can add care plan individualizations to a care plan. Examples of Individualization might be:  \"Parent requests to be called daily at 9am for status\", \"I have a hard time hearing out of my right ear\", or \"Do not touch me to wake me up as it startles  me\".  Outcome: Progressing  Goal: Absence of Hospital-Acquired Illness or Injury  Outcome: Progressing  Intervention: Identify and Manage Fall Risk  Recent Flowsheet Documentation  Taken 7/26/2025 2039 by Hazel Miller, RN  Safety Promotion/Fall Prevention:   activity " supervised   assistive device/personal items within reach   clutter free environment maintained   nonskid shoes/slippers when out of bed   room near nurse's station   room organization consistent   safety round/check completed  Intervention: Prevent Skin Injury  Recent Flowsheet Documentation  Taken 7/26/2025 2159 by Hazel Miller RN  Body Position: supine, head elevated  Intervention: Prevent Infection  Recent Flowsheet Documentation  Taken 7/27/2025 0021 by Hazel Miller, RN  Infection Prevention:   environmental surveillance performed   rest/sleep promoted   single patient room provided  Taken 7/26/2025 1927 by Hazel Miller RN  Infection Prevention:   environmental surveillance performed   single patient room provided  Goal: Optimal Comfort and Wellbeing  Outcome: Progressing  Intervention: Monitor Pain and Promote Comfort  Recent Flowsheet Documentation  Taken 7/26/2025 2159 by Hazel Miller RN  Pain Management Interventions:   medication (see MAR)   care clustered   quiet environment facilitated   rest  Goal: Readiness for Transition of Care  Outcome: Progressing     Problem: Delirium  Goal: Optimal Coping  Outcome: Progressing  Goal: Improved Behavioral Control  Outcome: Progressing  Intervention: Minimize Safety Risk  Recent Flowsheet Documentation  Taken 7/26/2025 2039 by Hazel Miller RN  Enhanced Safety Measures:   assistive devices when indicated   pain management   room near unit station  Goal: Improved Attention and Thought Clarity  Outcome: Progressing  Goal: Improved Sleep  Outcome: Progressing     Problem: Hip Fracture Surgical Repair  Goal: Optimal Coping with Change in Health Status  Outcome: Progressing  Goal: Absence of Bleeding  Outcome: Progressing  Goal: Effective Bowel Elimination  Outcome: Progressing  Intervention: Enhance Bowel Motility and Elimination  Recent Flowsheet Documentation  Taken 7/26/2025 2039 by Hazel Miller RN  Bowel Elimination Promotion:   adequate fluid intake  promoted   ambulation promoted  Goal: Cognitive Function Maintained  Outcome: Progressing  Goal: Fluid and Electrolyte Balance  Outcome: Progressing  Goal: Absence of Infection Signs and Symptoms  Outcome: Progressing  Goal: Optimal Functional Ability  Outcome: Progressing  Goal: Anesthesia/Sedation Recovery  Outcome: Progressing  Intervention: Optimize Anesthesia Recovery  Recent Flowsheet Documentation  Taken 7/26/2025 2039 by Hazel Miller, RN  Safety Promotion/Fall Prevention:   activity supervised   assistive device/personal items within reach   clutter free environment maintained   nonskid shoes/slippers when out of bed   room near nurse's station   room organization consistent   safety round/check completed  Goal: Optimal Pain Control and Function  Outcome: Progressing  Intervention: Prevent or Manage Pain  Recent Flowsheet Documentation  Taken 7/26/2025 2159 by Hazel Miller, RN  Pain Management Interventions:   medication (see MAR)   care clustered   quiet environment facilitated   rest  Goal: Nausea and Vomiting Relief  Outcome: Progressing  Intervention: Prevent or Manage Nausea and Vomiting  Recent Flowsheet Documentation  Taken 7/26/2025 2039 by Hazel Miller, RN  Nausea/Vomiting Interventions: (denies) other (see comments)  Goal: Effective Urinary Elimination  Outcome: Progressing  Goal: Effective Oxygenation and Ventilation  Outcome: Progressing  Intervention: Optimize Oxygenation and Ventilation  Recent Flowsheet Documentation  Taken 7/26/2025 2159 by Hazel Miller, RN  Head of Bed (HOB) Positioning: HOB at 20-30 degrees

## 2025-07-28 ENCOUNTER — APPOINTMENT (OUTPATIENT)
Dept: PHYSICAL THERAPY | Facility: CLINIC | Age: 75
DRG: 481 | End: 2025-07-28
Payer: COMMERCIAL

## 2025-07-28 LAB
HOLD SPECIMEN: NORMAL
MAGNESIUM SERPL-MCNC: 2 MG/DL (ref 1.7–2.3)
PHOSPHATE SERPL-MCNC: 3.5 MG/DL (ref 2.5–4.5)
POTASSIUM SERPL-SCNC: 4.5 MMOL/L (ref 3.4–5.3)

## 2025-07-28 PROCEDURE — 120N000001 HC R&B MED SURG/OB

## 2025-07-28 PROCEDURE — 83735 ASSAY OF MAGNESIUM: CPT | Performed by: INTERNAL MEDICINE

## 2025-07-28 PROCEDURE — 999N000157 HC STATISTIC RCP TIME EA 10 MIN

## 2025-07-28 PROCEDURE — 97110 THERAPEUTIC EXERCISES: CPT | Mod: GP | Performed by: PHYSICAL THERAPIST

## 2025-07-28 PROCEDURE — 250N000013 HC RX MED GY IP 250 OP 250 PS 637: Performed by: INTERNAL MEDICINE

## 2025-07-28 PROCEDURE — 94660 CPAP INITIATION&MGMT: CPT

## 2025-07-28 PROCEDURE — 36415 COLL VENOUS BLD VENIPUNCTURE: CPT | Performed by: INTERNAL MEDICINE

## 2025-07-28 PROCEDURE — 84132 ASSAY OF SERUM POTASSIUM: CPT | Performed by: INTERNAL MEDICINE

## 2025-07-28 PROCEDURE — 99233 SBSQ HOSP IP/OBS HIGH 50: CPT | Performed by: INTERNAL MEDICINE

## 2025-07-28 PROCEDURE — 250N000011 HC RX IP 250 OP 636: Performed by: PHYSICIAN ASSISTANT

## 2025-07-28 PROCEDURE — 84100 ASSAY OF PHOSPHORUS: CPT | Performed by: INTERNAL MEDICINE

## 2025-07-28 PROCEDURE — 97530 THERAPEUTIC ACTIVITIES: CPT | Mod: GP | Performed by: PHYSICAL THERAPIST

## 2025-07-28 PROCEDURE — 250N000013 HC RX MED GY IP 250 OP 250 PS 637: Performed by: STUDENT IN AN ORGANIZED HEALTH CARE EDUCATION/TRAINING PROGRAM

## 2025-07-28 RX ORDER — MAGNESIUM OXIDE 400 MG/1
400 TABLET ORAL EVERY 4 HOURS
Status: COMPLETED | OUTPATIENT
Start: 2025-07-28 | End: 2025-07-28

## 2025-07-28 RX ADMIN — METHOCARBAMOL 500 MG: 500 TABLET ORAL at 06:50

## 2025-07-28 RX ADMIN — PRAVASTATIN SODIUM 20 MG: 20 TABLET ORAL at 20:36

## 2025-07-28 RX ADMIN — ACETAMINOPHEN 1000 MG: 500 TABLET, FILM COATED ORAL at 06:50

## 2025-07-28 RX ADMIN — AMITRIPTYLINE HYDROCHLORIDE 75 MG: 25 TABLET, FILM COATED ORAL at 20:36

## 2025-07-28 RX ADMIN — PANTOPRAZOLE SODIUM 40 MG: 40 TABLET, DELAYED RELEASE ORAL at 09:11

## 2025-07-28 RX ADMIN — Medication 400 MG: at 12:54

## 2025-07-28 RX ADMIN — TAMSULOSIN HYDROCHLORIDE 0.4 MG: 0.4 CAPSULE ORAL at 20:36

## 2025-07-28 RX ADMIN — ENOXAPARIN SODIUM 40 MG: 40 INJECTION SUBCUTANEOUS at 12:54

## 2025-07-28 RX ADMIN — FLUOXETINE HYDROCHLORIDE 40 MG: 20 CAPSULE ORAL at 09:11

## 2025-07-28 RX ADMIN — METOPROLOL SUCCINATE 25 MG: 25 TABLET, EXTENDED RELEASE ORAL at 09:11

## 2025-07-28 RX ADMIN — METOPROLOL SUCCINATE 25 MG: 25 TABLET, EXTENDED RELEASE ORAL at 20:36

## 2025-07-28 RX ADMIN — ACETAMINOPHEN 1000 MG: 500 TABLET, FILM COATED ORAL at 22:23

## 2025-07-28 RX ADMIN — ACETAMINOPHEN 1000 MG: 500 TABLET, FILM COATED ORAL at 13:01

## 2025-07-28 RX ADMIN — OXYCODONE HYDROCHLORIDE 5 MG: 5 TABLET ORAL at 13:01

## 2025-07-28 RX ADMIN — Medication 400 MG: at 10:44

## 2025-07-28 ASSESSMENT — ACTIVITIES OF DAILY LIVING (ADL)
ADLS_ACUITY_SCORE: 53
ADLS_ACUITY_SCORE: 57
ADLS_ACUITY_SCORE: 48
ADLS_ACUITY_SCORE: 48
ADLS_ACUITY_SCORE: 53
ADLS_ACUITY_SCORE: 48
ADLS_ACUITY_SCORE: 48
ADLS_ACUITY_SCORE: 53
ADLS_ACUITY_SCORE: 48
ADLS_ACUITY_SCORE: 53
ADLS_ACUITY_SCORE: 48
ADLS_ACUITY_SCORE: 53
ADLS_ACUITY_SCORE: 48
ADLS_ACUITY_SCORE: 53
ADLS_ACUITY_SCORE: 57
ADLS_ACUITY_SCORE: 53
ADLS_ACUITY_SCORE: 48
ADLS_ACUITY_SCORE: 48
ADLS_ACUITY_SCORE: 53

## 2025-07-28 NOTE — PROVIDER NOTIFICATION
07/28/25 0211   Tech Time   $Tech Time (10 minute increments) 3   Mode: CPAP/ BiPAP/ AVAPS/ AVAPS AE   CPAP/BiPAP/ AVAPS/ AVAPS AE Mode CPAP   Equipment   Device Resmed   CPAP/BiPAP/Settings   $CPAP/BiPAP Subsequent completed   BIPAP/CPAP On Standby On   Oxygen (%) 21   CPAP/BiPAP Patient Parameters   CPAP (cm H2O) 14 cmh2o   RR Total (breaths/min) 16 breaths/min   CPAP/BiPAP/AVAPS/AVAPS AE Alarms   Humidifier Checked N/A   RT Device Skin Assessment   Oxygen Delivery Device CPAP/BiPAP Mask   Interface Face Mask - Large   Ventilator Arm In Place No   Site Appearance neck circumference Clean and dry   Site Appearance bridge of nose Clean and dry   Site Appearance occiput Clean and dry   Strap Tightness Finger Allowance between head and device strap   Device Skin Interventions Taken No adjustments needed   Respiratory WDL   Respiratory WDL X   Rhythm/Pattern, Respiratory assisted mechanically   Expansion/Accessory Muscles/Retractions expansion symmetric;no retractions;no use of accessory muscles     Mukul Kwong, RT on 7/28/2025 at 2:13 AM

## 2025-07-28 NOTE — PLAN OF CARE
"Patient vital signs are at baseline: Yes  Patient able to ambulate as they were prior to admission or with assist devices provided by therapies during their stay:  No,  Reason:  requires use of Ngozi steady for transfers from chair to bed and vice versa this shift.  Patient MUST void prior to discharge:  Yes  Patient able to tolerate oral intake:  Yes  Pain has adequate pain control using Oral analgesics:  Yes  Does patient have an identified :  Yes  Has goal D/C date and time been discussed with patient:  No,  Reason:  Pending placement and progress in ambulation.       Goal Outcome Evaluation: Pt with mild pain with light touch to L hip incisions. Pain managed with scheduled Tylenol, RICE. Mild swelling and erythema to surrounding incisions. Scant drainage to distal incision. Discharge plan pending TCU placement.      Plan of Care Reviewed With: patient    Overall Patient Progress: improvingOverall Patient Progress: improving    Outcome Evaluation: Pt awaiting placement.        Problem: Adult Inpatient Plan of Care  Goal: Plan of Care Review  Description: The Plan of Care Review/Shift note should be completed every shift.  The Outcome Evaluation is a brief statement about your assessment that the patient is improving, declining, or no change.  This information will be displayed automatically on your shift  note.  Outcome: Progressing  Flowsheets (Taken 7/28/2025 0985)  Outcome Evaluation: Pt awaiting placement.  Plan of Care Reviewed With: patient  Overall Patient Progress: improving  Goal: Patient-Specific Goal (Individualized)  Description: You can add care plan individualizations to a care plan. Examples of Individualization might be:  \"Parent requests to be called daily at 9am for status\", \"I have a hard time hearing out of my right ear\", or \"Do not touch me to wake me up as it startles  me\".  Outcome: Progressing  Goal: Absence of Hospital-Acquired Illness or Injury  Outcome: Progressing  Intervention: " Identify and Manage Fall Risk  Recent Flowsheet Documentation  Taken 7/28/2025 1619 by Anita Lira RN  Safety Promotion/Fall Prevention:   assistive device/personal items within reach   activity supervised   clutter free environment maintained   nonskid shoes/slippers when out of bed   room organization consistent   safety round/check completed  Intervention: Prevent and Manage VTE (Venous Thromboembolism) Risk  Recent Flowsheet Documentation  Taken 7/28/2025 1619 by Anita Lira RN  VTE Prevention/Management: (pt sitting in chair at this time.) other (see comments)  Intervention: Prevent Infection  Recent Flowsheet Documentation  Taken 7/28/2025 1619 by Anita Lira RN  Infection Prevention:   rest/sleep promoted   single patient room provided  Goal: Optimal Comfort and Wellbeing  Outcome: Progressing  Intervention: Monitor Pain and Promote Comfort  Recent Flowsheet Documentation  Taken 7/28/2025 1617 by Anita Lira RN  Pain Management Interventions: cold applied  Intervention: Provide Person-Centered Care  Recent Flowsheet Documentation  Taken 7/28/2025 1619 by Anita Lira RN  Trust Relationship/Rapport:   questions answered   questions encouraged   thoughts/feelings acknowledged   care explained  Goal: Readiness for Transition of Care  Outcome: Progressing     Problem: Delirium  Goal: Optimal Coping  Outcome: Progressing  Intervention: Optimize Psychosocial Adjustment to Delirium  Recent Flowsheet Documentation  Taken 7/28/2025 1619 by Anita Lira RN  Family/Support System Care: self-care encouraged  Goal: Improved Behavioral Control  Outcome: Progressing  Intervention: Minimize Safety Risk  Recent Flowsheet Documentation  Taken 7/28/2025 1619 by Anita Lira RN  Enhanced Safety Measures:   patient/family teach back on injury risk   review medications for side effects with activity   Pre/Post Op education on fall prevention  Trust Relationship/Rapport:   questions answered   questions  encouraged   thoughts/feelings acknowledged   care explained  Goal: Improved Attention and Thought Clarity  Outcome: Progressing  Intervention: Maximize Cognitive Function  Recent Flowsheet Documentation  Taken 7/28/2025 1619 by Anita Lira RN  Reorientation Measures: clock in view  Goal: Improved Sleep  Outcome: Progressing  Intervention: Promote Sleep  Recent Flowsheet Documentation  Taken 7/28/2025 1619 by Anita Lira RN  Sleep/Rest Enhancement: relaxation techniques promoted     Problem: Hip Fracture Surgical Repair  Goal: Optimal Coping with Change in Health Status  Outcome: Progressing  Intervention: Support Psychosocial Response to Surgery and Mobility Changes  Recent Flowsheet Documentation  Taken 7/28/2025 1619 by Anita Lira RN  Family/Support System Care: self-care encouraged  Goal: Absence of Bleeding  Outcome: Progressing  Intervention: Monitor and Manage Bleeding  Recent Flowsheet Documentation  Taken 7/28/2025 1619 by Anita Lira RN  Bleeding Management: dressing monitored  Goal: Effective Bowel Elimination  Outcome: Progressing  Intervention: Enhance Bowel Motility and Elimination  Recent Flowsheet Documentation  Taken 7/28/2025 1619 by Anita Lira RN  Bowel Elimination Management: relaxation techniques promoted  Bowel Elimination Promotion:   adequate fluid intake promoted   ambulation promoted  Goal: Cognitive Function Maintained  Outcome: Progressing  Intervention: Maintain Cognitive Function  Recent Flowsheet Documentation  Taken 7/28/2025 1619 by Anita Lira RN  Reorientation Measures: clock in view  Sleep/Rest Enhancement: relaxation techniques promoted  Goal: Fluid and Electrolyte Balance  Outcome: Progressing  Goal: Absence of Infection Signs and Symptoms  Outcome: Progressing  Intervention: Prevent or Manage Infection  Recent Flowsheet Documentation  Taken 7/28/2025 1619 by Anita Lira RN  Infection Management: aseptic technique maintained  Goal: Optimal  Functional Ability  Outcome: Progressing  Intervention: Protect Joint Integrity  Recent Flowsheet Documentation  Taken 7/28/2025 1619 by Anita Lira, RN  Equipment: ice  Intervention: Promote Optimal Functional Status  Recent Flowsheet Documentation  Taken 7/28/2025 1619 by Anita Lira, RN  Self-Care Promotion:   independence encouraged   BADL personal objects within reach  Activity Management:   activity adjusted per tolerance   activity encouraged   up in chair  Goal: Anesthesia/Sedation Recovery  Outcome: Progressing  Intervention: Optimize Anesthesia Recovery  Recent Flowsheet Documentation  Taken 7/28/2025 1619 by Anita Lira RN  Safety Promotion/Fall Prevention:   assistive device/personal items within reach   activity supervised   clutter free environment maintained   nonskid shoes/slippers when out of bed   room organization consistent   safety round/check completed  Reorientation Measures: clock in view  Goal: Optimal Pain Control and Function  Outcome: Progressing  Intervention: Prevent or Manage Pain  Recent Flowsheet Documentation  Taken 7/28/2025 1617 by Anita Lira RN  Pain Management Interventions: cold applied  Goal: Nausea and Vomiting Relief  Outcome: Progressing  Intervention: Prevent or Manage Nausea and Vomiting  Recent Flowsheet Documentation  Taken 7/28/2025 1619 by Anita Lira RN  Nausea/Vomiting Interventions: (denies) other (see comments)  Goal: Effective Urinary Elimination  Outcome: Progressing  Goal: Effective Oxygenation and Ventilation  Outcome: Progressing

## 2025-07-28 NOTE — PROGRESS NOTES
[facility]    Medicine Progress Note - Hospitalist Service    Date of Admission:  7/22/2025    Assessment & Plan     Leodan Yanez is a 74 year old male with past medical history significant for AAA, atrial fibrillation, hypertension, hyperlipidemia, BPH, MONSE, GERD, insomnia, neuropathy, glaucoma, and nephrolithiasis who presented to [facility] on 7/22/2025 after a fall and was found to have displaced intertrochanteric proximal left femur fracture s/p ORIF.     Comminuted, Displaced, Left, Intertrochanteric/Subtrochanteric, Proximal Femur Fracture s/p POD 5 ORIF   Acute blood loss anemia related to surgery      Presented after a mechanical fall. Imaging with comminuted displaced intertrochanteric/subtrochanteric fracture of the proximal left femur. Orthopedic Surgery consulted and took patient to OR  for ORIF with intramedullary nail on 7/23/25.     -No overt ongoing bleeding, no indication for current transfusion.   Will recheck hgb am 7/29/25    -Now doing well post-operatively. Post-op site cares, activity restrictions, pain medications, DVT proph per orthopedics.   PT/OT consulted; PT recommending TCU at this time.       Hypokalemia, resolved  Hypomagnesemia, resolved  Hypocalcemia, resolved    - electrolytes replaced per protocol and have normalized    - holding PTA Hctz     Chronic Medical Problems  Chronic Parox Atrial Fibrillation  HTN:  -On ASA PTA 81mg daily; not on anticoagulation otherwise.  Pt with hx of GI bleed.    -BP low normal post-op and so BP meds initially held.    BP trending up 7/27/25 - metoprolol resumed at 25mg po bid with holding parameters (Baseline on 200 mg XL/day).    Continue to hold PTA norvasc (10mg), lisinopril 20mg and hydrochlorothiazide (12.5mg) for now    7/28/25 - SBP this am 120's                 No med changes at this time but will continue to monitor vitals closely        Chronic Peripheral Neuropathy -   hx of BLE neuropathy with  "reported poor balance at baseline.  Uses a cane.   Continue PTA Amitriptyline     HLD - continue Pravastatin     GERD - continue Protonix     BPH - continue Flomax     STEPHEN - continue Fluoxetine     Hx AAA - Mild ascending aorta dilatation (4.1 cm) noted on echo 2019.  Outpt monitoring.     MONSE - continue cpap     Obesity - hx Mirella en Y gastric bypass    PPE Used:  Mask, gloves          Diet: Regular Diet Adult    DVT Prophylaxis: Enoxaparin (Lovenox) SQ  Linder Catheter: Not present  Lines: None     Cardiac Monitoring: None  Code Status: Full Code      Clinically Significant Risk Factors                   # Hypertension: Noted on problem list            # Morbid Obesity: Estimated body mass index is 42.49 kg/m  as calculated from the following:    Height as of this encounter: 1.854 m (6' 1\").    Weight as of this encounter: 146.1 kg (322 lb 1.5 oz).      # Financial/Environmental Concerns: none         Disposition Plan     Medically Ready for Discharge: Anticipated Today    Awaiting TCU bed         Jade Bowman DO  Hospitalist Service  [facility]  Securely message with [employer] (more info)  Text page via [email or web address] Paging/Directory   ______________________________________________________________________    Interval History     Frustrated that he has no TCU bed yet.  Still feeling weak and unsteady at times but would like to leave the hospital.  No currentl HA, CP, SOB, F/C or N/V.  Appetite good.  Sleeping ok.  Having a lot of (chronic) joint aches.  Post-op pain controlled    Physical Exam   Vital Signs: Temp: 98  F (36.7  C) Temp src: Temporal BP: 129/82 Pulse: 78   Resp: 16 SpO2: 98 % O2 Device: BiPAP/CPAP    Weight: 322 lbs 1.47 oz    GEN:  Alert, oriented x 3, appears comfortable, no overt distress.  Easily conversant  HEENT:  Normocephalic/atraumatic, no scleral icterus, no nasal discharge  CV:  somewhat distant but regular rate and rhythm, no loud murmur.  S1 + S2 noted, no S3 or " S4.  LUNGS:  Clear to auscultation ant/lat bilaterally without rales/rhonchi/wheezing/retractions.  Symmetric chest rise on inhalation noted.  ABD:  Active bowel sounds, soft, non-tender to light palpation throughout, mod distended.  No rebound/guarding/rigidity.  EXT:  trace pretibial edema bilaterally.  No cyanosis.   Dressing to the left lat hip appears to be clean and dry  Expected post-op swelling noted - no bruising seen this am    Medical Decision Making       50 MINUTES SPENT BY ME on the date of service doing chart review, history, exam, documentation & further activities per the note.      Data   Medications   Current Facility-Administered Medications   Medication Dose Route Frequency Provider Last Rate Last Admin     Current Facility-Administered Medications   Medication Dose Route Frequency Provider Last Rate Last Admin    acetaminophen (TYLENOL) tablet 1,000 mg  1,000 mg Oral Q8H Babak Ball MD   1,000 mg at 07/28/25 0650    amitriptyline (ELAVIL) tablet 75 mg  75 mg Oral At Bedtime Babak Ball MD   75 mg at 07/27/25 2111    [Held by provider] amLODIPine (NORVASC) tablet 5 mg  5 mg Oral Daily Samuel Eckert DO        enoxaparin ANTICOAGULANT (LOVENOX) injection 40 mg  40 mg Subcutaneous Q24H Yakelin Helm PA-C   40 mg at 07/27/25 1344    FLUoxetine (PROzac) capsule 40 mg  40 mg Oral QAM Babak Ball MD   40 mg at 07/27/25 1023    metoprolol succinate ER (TOPROL XL) 24 hr tablet 25 mg  25 mg Oral BID Samuel Eckert DO   25 mg at 07/27/25 2111    pantoprazole (PROTONIX) EC tablet 40 mg  40 mg Oral Daily Babak Ball MD   40 mg at 07/27/25 1023    pravastatin (PRAVACHOL) tablet 20 mg  20 mg Oral At Bedtime Babak Ball MD   20 mg at 07/27/25 2111    senna-docusate (SENOKOT-S/PERICOLACE) 8.6-50 MG per tablet 1 tablet  1 tablet Oral BID Babak Ball MD   1 tablet at 07/27/25 2111    Or    senna-docusate (SENOKOT-S/PERICOLACE) 8.6-50 MG per  tablet 2 tablet  2 tablet Oral BID Babak Ball MD        sodium chloride (PF) 0.9% PF flush 3 mL  3 mL Intracatheter Q8H SKYLER Babak Ball MD   3 mL at 07/24/25 2112    tamsulosin (FLOMAX) capsule 0.4 mg  0.4 mg Oral QPM Babak Ball MD   0.4 mg at 07/27/25 2111     Labs and Imaging results below reviewed today.  Recent Labs   Lab 07/27/25  0741 07/26/25  0558 07/25/25  0614 07/24/25  0646 07/22/25  1500   WBC  --   --   --   --  8.2   HGB 9.6*  --  10.1* 10.3* 13.4   HCT  --   --   --   --  40.3   MCV 88  88 88 88  88 90 87    159 141*  --  189     Recent Labs   Lab 07/28/25  0700 07/27/25  0741 07/26/25  0558 07/24/25 0646 07/23/25 0618 07/23/25 0349 07/22/25 2032 07/22/25  1500   NA  --  138  --   --   --  138  --  144   POTASSIUM 4.5 4.2  4.3 4.1   < >  --  4.0   < > 2.7*   CHLORIDE  --  103  --   --   --  100  --  114*   CO2  --  26  --   --   --  27  --  20*   ANIONGAP  --  9  --   --   --  11  --  10   GLC  --  119*  --   --  96 133*  --  101*   BUN  --  14.1  --   --   --  18.2  --  13.9   CR  --  0.78  --   --   --  0.95  --  0.76   GFRESTIMATED  --  >90  --   --   --  85  --  >90   KELLY  --  7.9*  --   --   --  8.7*  --  6.0*    < > = values in this interval not displayed.     7-Day Micro Results       No results found for the last 168 hours.          Recent Labs   Lab 07/28/25  0700 07/27/25  0741 07/26/25 0558 07/24/25 0646 07/23/25  0618 07/23/25 0349 07/22/25 2032 07/22/25  1500   NA  --  138  --   --   --  138  --  144   POTASSIUM 4.5 4.2  4.3 4.1   < >  --  4.0   < > 2.7*   CHLORIDE  --  103  --   --   --  100  --  114*   CO2  --  26  --   --   --  27  --  20*   ANIONGAP  --  9  --   --   --  11  --  10   GLC  --  119*  --   --  96 133*  --  101*   BUN  --  14.1  --   --   --  18.2  --  13.9   CR  --  0.78  --   --   --  0.95  --  0.76   GFRESTIMATED  --  >90  --   --   --  85  --  >90   KELLY  --  7.9*  --   --   --  8.7*  --  6.0*   MAG 2.0 2.1 2.0   < >   --  2.2   < > 1.3*   PHOS 3.5 3.8 3.3   < >  --  3.7  --  3.0    < > = values in this interval not displayed.       No results found for this or any previous visit (from the past 24 hours).

## 2025-07-28 NOTE — PROGRESS NOTES
Orthopedic Surgery  Leodan Yanez  07/28/2025     Admit Date:  7/22/2025  Assessment:   POD: 5 Days Post-Op   Procedure(s):  OPEN REDUCTION INTERNAL FIXATION, FRACTURE, FEMUR, USING INTRAMEDULLARY HERB, LEFT     Patient resting comfortably in bed    Patient reports ongoing pain, medications help  Tolerating oral intake  Denies nausea or vomiting  Denies chest pain or shortness of breath  Denies fever or chills    Temp:  [97.8  F (36.6  C)-98.3  F (36.8  C)] 98  F (36.7  C)  Pulse:  [78-91] 78  Resp:  [16] 16  BP: (129-142)/(69-91) 129/82  FiO2 (%):  [21 %] 21 %  SpO2:  [97 %-99 %] 98 %    Alert and oriented  Dressing is clean, dry, and intact--mild shadowing present on one dressing   Minimal erythema of the surrounding skin   Bilateral calves are soft, non-tender  Left lower extremity is NVI  Sensation intact bilateral lower extremities  Patient able to resist dorsi and plantar flexion bilaterally  +Dp pulse bilaterally    Labs:  Recent Labs   Lab Test 07/27/25  0741 07/26/25  0558 07/25/25  0614 07/24/25  0646 07/22/25  1500 11/19/21  1746 11/19/21  1746 07/23/20  0206   WBC  --   --   --   --  8.2  --  8.1 11.5*   HGB 9.6*  --  10.1* 10.3* 13.4   < > 14.7 15.5    159 141*  --  189   < > 210 197    < > = values in this interval not displayed.     Recent Labs   Lab Test 07/22/25  2115   INR 1.03       Plan:  Continue Lovenox for DVT prophylaxis while in hospital. History of GI   bleed, but plan to discharge on 81 mg ASA BID x6 weeks at discharge.   Patient should take omeprazole 40 mg daily while taking aspirin for GI   bleed risk reduction.    US negative for DVT              Mobilize with PT/OT               WBAT LLE with walker.                Continue current pain regimen.              Dressings: Keep intact.  Change if >60% saturated or peeling off.               Follow-up: 2 weeks post-op with Dr. Billy Clark's team   Social work coordinating discharge planning    Disposition:    Anticipate d/c to  TCU when medically cleared and placement has   been coordinated    Lizbet Juarez PA-C  [facility]

## 2025-07-28 NOTE — PLAN OF CARE
"/71 (BP Location: Left arm)   Pulse 86   Temp 97.6  F (36.4  C) (Temporal)   Resp 16   Ht 1.854 m (6' 1\")   Wt (!) 146.1 kg (322 lb 1.5 oz)   SpO2 97%   BMI 42.49 kg/m      Neuro: a/o x4  Cardiac: WNL  Lungs: cpap overnight  GI: WNL  : urinal  Pain: 1/10  IV: None  Meds: tylenol, robaxin, aspirin  Labs/tests: K 4.5, Mg 2.0, Phos 3.5  Diet: reg  Activity: assist x2/smiley steady  Misc: K, Mg, Phos protocol. SW following  Plan: waiting on placement. Currently resting in bed, able to make need known.           Problem: Adult Inpatient Plan of Care  Goal: Plan of Care Review  Description: The Plan of Care Review/Shift note should be completed every shift.  The Outcome Evaluation is a brief statement about your assessment that the patient is improving, declining, or no change.  This information will be displayed automatically on your shift  note.  Outcome: Progressing  Flowsheets (Taken 7/28/2025 9694)  Outcome Evaluation: awaiting placement  Plan of Care Reviewed With: patient  Overall Patient Progress: improving  Goal: Patient-Specific Goal (Individualized)  Description: You can add care plan individualizations to a care plan. Examples of Individualization might be:  \"Parent requests to be called daily at 9am for status\", \"I have a hard time hearing out of my right ear\", or \"Do not touch me to wake me up as it startles  me\".  Outcome: Progressing  Goal: Absence of Hospital-Acquired Illness or Injury  Outcome: Progressing  Intervention: Identify and Manage Fall Risk  Recent Flowsheet Documentation  Taken 7/28/2025 0911 by Tiffany Fields RN  Safety Promotion/Fall Prevention:   activity supervised   assistive device/personal items within reach   clutter free environment maintained   nonskid shoes/slippers when out of bed   safety round/check completed  Intervention: Prevent Skin Injury  Recent Flowsheet Documentation  Taken 7/28/2025 0911 by Tiffany Fields, RN  Body Position: position changed " independently  Intervention: Prevent and Manage VTE (Venous Thromboembolism) Risk  Recent Flowsheet Documentation  Taken 7/28/2025 0911 by Tiffany Fields RN  VTE Prevention/Management: foot pump device on  Intervention: Prevent Infection  Recent Flowsheet Documentation  Taken 7/28/2025 0911 by Tiffany Fields RN  Infection Prevention:   single patient room provided   rest/sleep promoted  Goal: Optimal Comfort and Wellbeing  Outcome: Progressing  Goal: Readiness for Transition of Care  Outcome: Progressing     Problem: Delirium  Goal: Optimal Coping  Outcome: Progressing  Goal: Improved Behavioral Control  Outcome: Progressing  Intervention: Minimize Safety Risk  Recent Flowsheet Documentation  Taken 7/28/2025 0911 by Tiffany Fields RN  Enhanced Safety Measures:   pain management   patient/family teach back on injury risk   review medications for side effects with activity  Goal: Improved Attention and Thought Clarity  Outcome: Progressing  Goal: Improved Sleep  Outcome: Progressing     Problem: Hip Fracture Surgical Repair  Goal: Optimal Coping with Change in Health Status  Outcome: Progressing  Goal: Absence of Bleeding  Outcome: Progressing  Goal: Effective Bowel Elimination  Outcome: Progressing  Goal: Cognitive Function Maintained  Outcome: Progressing  Goal: Fluid and Electrolyte Balance  Outcome: Progressing  Goal: Absence of Infection Signs and Symptoms  Outcome: Progressing  Goal: Optimal Functional Ability  Outcome: Progressing  Intervention: Promote Optimal Functional Status  Recent Flowsheet Documentation  Taken 7/28/2025 0911 by Tiffany Fields RN  Activity Management: activity adjusted per tolerance  Goal: Anesthesia/Sedation Recovery  Outcome: Progressing  Intervention: Optimize Anesthesia Recovery  Recent Flowsheet Documentation  Taken 7/28/2025 0911 by Tiffany Fields RN  Safety Promotion/Fall Prevention:   activity supervised   assistive device/personal items within reach   clutter free  environment maintained   nonskid shoes/slippers when out of bed   safety round/check completed  Goal: Optimal Pain Control and Function  Outcome: Progressing  Goal: Nausea and Vomiting Relief  Outcome: Progressing  Goal: Effective Urinary Elimination  Outcome: Progressing  Goal: Effective Oxygenation and Ventilation  Outcome: Progressing  Intervention: Optimize Oxygenation and Ventilation  Recent Flowsheet Documentation  Taken 7/28/2025 0911 by Tiffany Fields, RN  Head of Bed (HOB) Positioning: HOB at 20-30 degrees           Goal Outcome Evaluation:      Plan of Care Reviewed With: patient    Overall Patient Progress: improvingOverall Patient Progress: improving    Outcome Evaluation: awaiting placement

## 2025-07-28 NOTE — PLAN OF CARE
"Goal Outcome Evaluation:      Plan of Care Reviewed With: patient    Overall Patient Progress: no changeOverall Patient Progress: no change    Outcome Evaluation: D/c plan pending placement    Patient vital signs are at baseline: Yes  Patient able to ambulate as they were prior to admission or with assist devices provided by therapies during their stay:  Yes  Patient MUST void prior to discharge:  Yes  Patient able to tolerate oral intake:  Yes  Pain has adequate pain control using Oral analgesics:  Yes  Does patient have an identified :  Yes  Has goal D/C date and time been discussed with patient:  No,  Reason:  pending placement, referrals sent     Problem: Adult Inpatient Plan of Care  Goal: Plan of Care Review  Description: The Plan of Care Review/Shift note should be completed every shift.  The Outcome Evaluation is a brief statement about your assessment that the patient is improving, declining, or no change.  This information will be displayed automatically on your shift  note.  Outcome: Progressing  Flowsheets (Taken 7/28/2025 1123)  Outcome Evaluation: D/c plan pending placement  Plan of Care Reviewed With: patient  Overall Patient Progress: no change  Goal: Patient-Specific Goal (Individualized)  Description: You can add care plan individualizations to a care plan. Examples of Individualization might be:  \"Parent requests to be called daily at 9am for status\", \"I have a hard time hearing out of my right ear\", or \"Do not touch me to wake me up as it startles  me\".  Outcome: Progressing  Goal: Absence of Hospital-Acquired Illness or Injury  Outcome: Progressing  Intervention: Identify and Manage Fall Risk  Recent Flowsheet Documentation  Taken 7/27/2025 2117 by Hazel Miller, RN  Safety Promotion/Fall Prevention:   activity supervised   assistive device/personal items within reach   clutter free environment maintained   nonskid shoes/slippers when out of bed   room near nurse's station   room " HISTORY OF PRESENT ILLNESS  Jeanette Turner is a 68 y.o. female. Ms. Papo Gilbert presents for follow-up of type 2 diabetes, hypertension and weight management issues with a history of major depression followed by psychiatry. She reports persistent loose stools and has been for gastroenterology evaluation and colonoscopy with no abnormal findings. She was taken off metformin because of this but the symptoms only transiently improved. She notes that she has been taking fish oil and plans to stop taking that to see if that will help. Mr#: [id number]      Patient Active Problem List   Diagnosis Code    Type 2 diabetes mellitus with diabetic nephropathy, without long-term current use of insulin [facility] E11.21    Essential hypertension I10    Major depressive disorder with single episode F32.9    Gastroesophageal reflux disease without esophagitis K21.9    Neuroleptic-induced tardive dyskinesia G24.01, T43.505A    Osteoarthritis of lumbar spine M47.816         Current Outpatient Medications:     losartan (COZAAR) 50 mg tablet, take 1 tablet by mouth once daily, Disp: 90 Tab, Rfl: 3    valACYclovir (VALTREX) 500 mg tablet, Take 1 Tab by mouth two (2) times a day., Disp: 180 Tab, Rfl: 3    felodipine (PLENDIL SR) 5 mg 24 hr tablet, Take 1 Tab by mouth daily. , Disp: 90 Tab, Rfl: 3    Blood-Glucose Meter (FreeStyle Lite Meter) monitoring kit, Use for daily glucose checks E 11.21, Disp: 1 Kit, Rfl: 0    lancets (FreeStyle Lancets) 28 gauge misc, Use for daily glucose checks E 11.21, Disp: 100 Lancet, Rfl: 4    glucose blood VI test strips (FreeStyle Lite Strips) strip, Use for daily glucose checks E 11.21, Disp: 100 Strip, Rfl: 4    nadoloL (CORGARD) 40 mg tablet, TAKE 1 TABLET DAILY, Disp: 90 Tab, Rfl: 4    glimepiride (AMARYL) 2 mg tablet, Take 1 Tab by mouth every morning., Disp: 30 Tab, Rfl: 11    aspirin delayed-release 81 mg tablet, Take  by mouth daily. , Disp: , Rfl:     OLANZapine (ZYPREXA) 5 mg tablet, Take  by mouth two (2) times a day., Disp: , Rfl:     deutetrabenazine (AUSTEDO) 12 mg tab, Take 16 mg by mouth two (2) times a day., Disp: , Rfl:      Allergies   Allergen Reactions    Gabapentin Other (comments)     Confusion      Resperal-Dm [Brompheniramine-Pseudoeph-Dm] Other (comments)     Difficulty standing    Zocor [Simvastatin] Other (comments)     Muscle pain       Review of Systems   Constitutional: Positive for weight loss (intentional). Negative for fever. HENT: Negative for congestion. Eyes: Negative for blurred vision. Respiratory: Negative for cough, shortness of breath and wheezing. Cardiovascular: Negative for chest pain, palpitations and leg swelling. Gastrointestinal: Positive for diarrhea (frequent, chronic diarrhea). Negative for abdominal pain, blood in stool, constipation, heartburn, melena, nausea and vomiting. Genitourinary: Negative for dysuria, frequency and urgency. Neurological: Negative for tingling and sensory change. Endo/Heme/Allergies: Negative for polydipsia. She reports glucometer checks show glucose levels varying from    Psychiatric/Behavioral: Positive for depression ( reported stable, followed by psychiatry). Negative for suicidal ideas. Visit Vitals  /70 (BP 1 Location: Left arm, BP Patient Position: Sitting)   Pulse 66   Temp 98.3 °F (36.8 °C) (Oral)   Resp 15   Ht 5' 1\" (1.549 m)   Wt 161 lb 3.2 oz (73.1 kg)   SpO2 98%   BMI 30.46 kg/m²       Physical Exam  Vitals signs and nursing note reviewed. Constitutional:       General: She is not in acute distress. Appearance: Normal appearance. She is well-developed. She is obese. She is not ill-appearing. Comments: 7 pound intentional weight loss in the past 4 months   HENT:      Head: Normocephalic. Eyes:      Conjunctiva/sclera: Conjunctivae normal.   Neck:      Musculoskeletal: Neck supple. Cardiovascular:      Rate and Rhythm: Normal rate and regular rhythm. organization consistent   safety round/check completed  Goal: Optimal Comfort and Wellbeing  Outcome: Progressing  Intervention: Monitor Pain and Promote Comfort  Recent Flowsheet Documentation  Taken 7/27/2025 2112 by Hazel Miller, RN  Pain Management Interventions:   medication (see MAR)   care clustered   quiet environment facilitated   rest  Goal: Readiness for Transition of Care  Outcome: Progressing     Problem: Delirium  Goal: Optimal Coping  Outcome: Progressing  Goal: Improved Behavioral Control  Outcome: Progressing  Intervention: Minimize Safety Risk  Recent Flowsheet Documentation  Taken 7/27/2025 2117 by Hazel Miller, RN  Enhanced Safety Measures:   pain management   patient/family teach back on injury risk   review medications for side effects with activity  Goal: Improved Attention and Thought Clarity  Outcome: Progressing  Goal: Improved Sleep  Outcome: Progressing     Problem: Hip Fracture Surgical Repair  Goal: Optimal Coping with Change in Health Status  Outcome: Progressing  Goal: Absence of Bleeding  Outcome: Progressing  Goal: Effective Bowel Elimination  Outcome: Progressing  Goal: Cognitive Function Maintained  Outcome: Progressing  Goal: Fluid and Electrolyte Balance  Outcome: Progressing  Goal: Absence of Infection Signs and Symptoms  Outcome: Progressing  Goal: Optimal Functional Ability  Outcome: Progressing  Goal: Anesthesia/Sedation Recovery  Outcome: Progressing  Intervention: Optimize Anesthesia Recovery  Recent Flowsheet Documentation  Taken 7/27/2025 2117 by Hazel Miller, RN  Safety Promotion/Fall Prevention:   activity supervised   assistive device/personal items within reach   clutter free environment maintained   nonskid shoes/slippers when out of bed   room near nurse's station   room organization consistent   safety round/check completed  Goal: Optimal Pain Control and Function  Outcome: Progressing  Intervention: Prevent or Manage Pain  Recent Flowsheet  Documentation  Taken 7/27/2025 2112 by Hazel Miller, RN  Pain Management Interventions:   medication (see MAR)   care clustered   quiet environment facilitated   rest  Goal: Nausea and Vomiting Relief  Outcome: Progressing  Intervention: Prevent or Manage Nausea and Vomiting  Recent Flowsheet Documentation  Taken 7/27/2025 2117 by Hazel Miller, RN  Nausea/Vomiting Interventions: (denies) other (see comments)  Goal: Effective Urinary Elimination  Outcome: Progressing  Goal: Effective Oxygenation and Ventilation  Outcome: Progressing      Heart sounds: Normal heart sounds. Pulmonary:      Effort: Pulmonary effort is normal.      Breath sounds: Normal breath sounds. Skin:     General: Skin is warm and dry. Neurological:      Mental Status: She is alert and oriented to person, place, and time. Psychiatric:         Mood and Affect: Mood normal.         Behavior: Behavior normal.            Diabetic foot exam performed by Chela Pelletier MD     Measurement  Response Nurse Comment Physician Comment   Monofilament  R - normal sensation with micro filament  L - normal sensation with micro filament     Pulse DP R - 2+ (normal)  L - 2+ (normal)     Pulse TP R - 2+ (normal)  L - 2+ (normal)     Structural deformity R - None  L - None     Skin Integrity / Deformity R - Mild -discolored toenails possibly consistent with onychomycosis  L - Mild -discolored toenails possibly consistent with onychomycosis                 Results for Bria Haas (MRN [id number]) as of 5/27/2020 15:14   Ref. Range 9/6/2019 11:40 1/20/2020 11:03 1/20/2020 11:10 1/21/2020 13:02 5/27/2020 14:03   GFR est AA Latest Ref Range: >60 ml/min/1.73m2    >60    Triglyceride Latest Ref Range: <150 MG/DL    95    Cholesterol, total Latest Ref Range: <200 MG/DL    200 (H)    HDL Cholesterol Latest Ref Range: 40 - 60 MG/DL    112 (H)    CHOL/HDL Ratio Latest Ref Range: 0 - 5.0      1.8    VLDL, calculated Latest Units: MG/DL    19    LDL, calculated Latest Ref Range: 0 - 100 MG/DL    69    Hemoglobin A1c (POC) Latest Units: % 6.1 6.0   5.7   Microalbumin urine (POC) Latest Units: MG/L   30     Microalbumin/creat ratio (POC) Latest Ref Range: <30 MG/G   <30       ASSESSMENT and PLAN    ICD-10-CM ICD-9-CM    1. Type 2 diabetes mellitus with diabetic nephropathy, without long-term current use of insulin ([facility]) E11.21 250.40 AMB POC HEMOGLOBIN A1C     583.81 HM DIABETES FOOT EXAM      HEMOGLOBIN A1C WITH EAG      MICROALBUMIN, UR, RAND W/ MICROALB/CREAT RATIO   2.  Essential hypertension I10 401.9 CBC WITH AUTOMATED DIFF      LIPID PANEL      METABOLIC PANEL, COMPREHENSIVE      URINALYSIS W/ RFLX MICROSCOPIC   3. Major depressive disorder with single episode, remission status unspecified F32.9 296.20 CBC WITH AUTOMATED DIFF      TSH 3RD GENERATION   4. Asymptomatic postmenopausal estrogen deficiency Z78.0 V49.81 DEXA BONE DENSITY STUDY AXIAL   5. Obesity (BMI 30.0-34. 9) E66.9 278.00    Diabetic control dramatically improved with lifestyle modifications, likely can be diet controlled.   Blood pressure in excellent control  Continue current medications but stop glimepiride  Continue excellent work with diet and exercise  Monitor glucose levels a few times a week and report levels consistently at or higher than 150  Consider Shingrix immunization to help prevent shingles available at the pharmacy  Expect a call about bone density scan which will hopefully be scheduled on the same day as the next mammogram  Schedule follow-up and Medicare wellness in 6 months with fasting lab a few days prior to that appointment  Follow-up sooner with any problems

## 2025-07-28 NOTE — PROGRESS NOTES
Care Management Follow Up    Length of Stay (days): 6    Expected Discharge Date: 07/29/2025     Concerns to be Addressed: discharge planning     Patient plan of care discussed at interdisciplinary rounds: Yes    Anticipated Discharge Disposition: Transitional Care              Anticipated Discharge Services: None  Anticipated Discharge DME:      Patient/family educated on Medicare website which has current facility and service quality ratings: yes  Education Provided on the Discharge Plan:    Patient/Family in Agreement with the Plan: yes    Referrals Placed by CM/SW: Post Acute Facilities  Private pay costs discussed: Not applicable    Discussed  Partnership in Safe Discharge Planning  document with patient/family: No     Handoff Completed: No, handoff not indicated or clinically appropriate    Additional Information:  TCU's pending won't accept until patient is moving more, Mod A1 and/ or weight bearing.       Next Steps: Monitor for the above and TCU placement.    Mercedes Tafoya, RN, BSN, CM  Inpatient Care Coordination  [facility]  [phone]

## 2025-07-28 NOTE — PLAN OF CARE
"Goal Outcome Evaluation:      Plan of Care Reviewed With: patient    Overall Patient Progress: improvingOverall Patient Progress: improving    Outcome Evaluation: Discharge TCU/ARU - SW following      Progressing with bed mobility - pt's request for \"hands off\" to get from supine, EOB, and standing in smiley-steady  This writer worked with pt in the afternoon with standing, weight bearing on LLE, and bed exercises whilst in the [facility]  Pain managed with Robaxin and scheduled tylenol  Makes needs known      Problem: Adult Inpatient Plan of Care  Goal: Plan of Care Review  Description: The Plan of Care Review/Shift note should be completed every shift.  The Outcome Evaluation is a brief statement about your assessment that the patient is improving, declining, or no change.  This information will be displayed automatically on your shift  note.  Outcome: Progressing  Flowsheets (Taken 7/27/2025 1944)  Outcome Evaluation: Discharge TCU/ARU - SW following  Plan of Care Reviewed With: patient  Overall Patient Progress: improving  Goal: Patient-Specific Goal (Individualized)  Description: You can add care plan individualizations to a care plan. Examples of Individualization might be:  \"Parent requests to be called daily at 9am for status\", \"I have a hard time hearing out of my right ear\", or \"Do not touch me to wake me up as it startles  me\".  Outcome: Progressing  Goal: Absence of Hospital-Acquired Illness or Injury  Outcome: Progressing  Intervention: Identify and Manage Fall Risk  Recent Flowsheet Documentation  Taken 7/27/2025 1030 by Reyes O'Connor, Bridget M, RN  Safety Promotion/Fall Prevention:   assistive device/personal items within reach   clutter free environment maintained   increased rounding and observation   increase visualization of patient   lighting adjusted   mobility aid in reach   nonskid shoes/slippers when out of bed   patient and family education   room organization consistent   safety " round/check completed   supervised activity  Intervention: Prevent Skin Injury  Recent Flowsheet Documentation  Taken 7/27/2025 1030 by Reyes O'Connor, Bridget M, RN  Body Position:   supine, head elevated   supine, legs elevated  Intervention: Prevent and Manage VTE (Venous Thromboembolism) Risk  Recent Flowsheet Documentation  Taken 7/27/2025 1030 by Reyes O'Connor, Bridget M, RN  VTE Prevention/Management: SCDs on (sequential compression devices)  Goal: Optimal Comfort and Wellbeing  Outcome: Progressing  Goal: Readiness for Transition of Care  Outcome: Progressing

## 2025-07-29 ENCOUNTER — APPOINTMENT (OUTPATIENT)
Dept: PHYSICAL THERAPY | Facility: CLINIC | Age: 75
DRG: 481 | End: 2025-07-29
Payer: COMMERCIAL

## 2025-07-29 PROCEDURE — 120N000001 HC R&B MED SURG/OB

## 2025-07-29 PROCEDURE — 250N000013 HC RX MED GY IP 250 OP 250 PS 637: Performed by: INTERNAL MEDICINE

## 2025-07-29 PROCEDURE — 97530 THERAPEUTIC ACTIVITIES: CPT | Mod: GP

## 2025-07-29 PROCEDURE — 94660 CPAP INITIATION&MGMT: CPT

## 2025-07-29 PROCEDURE — 999N000157 HC STATISTIC RCP TIME EA 10 MIN

## 2025-07-29 PROCEDURE — 250N000011 HC RX IP 250 OP 636: Performed by: PHYSICIAN ASSISTANT

## 2025-07-29 PROCEDURE — 250N000011 HC RX IP 250 OP 636: Performed by: STUDENT IN AN ORGANIZED HEALTH CARE EDUCATION/TRAINING PROGRAM

## 2025-07-29 PROCEDURE — 250N000013 HC RX MED GY IP 250 OP 250 PS 637: Performed by: STUDENT IN AN ORGANIZED HEALTH CARE EDUCATION/TRAINING PROGRAM

## 2025-07-29 PROCEDURE — 99232 SBSQ HOSP IP/OBS MODERATE 35: CPT | Performed by: STUDENT IN AN ORGANIZED HEALTH CARE EDUCATION/TRAINING PROGRAM

## 2025-07-29 RX ORDER — FUROSEMIDE 10 MG/ML
40 INJECTION INTRAMUSCULAR; INTRAVENOUS ONCE
Status: COMPLETED | OUTPATIENT
Start: 2025-07-29 | End: 2025-07-29

## 2025-07-29 RX ADMIN — FUROSEMIDE 40 MG: 10 INJECTION, SOLUTION INTRAMUSCULAR; INTRAVENOUS at 16:00

## 2025-07-29 RX ADMIN — OXYCODONE HYDROCHLORIDE 5 MG: 5 TABLET ORAL at 13:57

## 2025-07-29 RX ADMIN — METHOCARBAMOL 500 MG: 500 TABLET ORAL at 20:34

## 2025-07-29 RX ADMIN — AMITRIPTYLINE HYDROCHLORIDE 75 MG: 25 TABLET, FILM COATED ORAL at 20:35

## 2025-07-29 RX ADMIN — PRAVASTATIN SODIUM 20 MG: 20 TABLET ORAL at 20:34

## 2025-07-29 RX ADMIN — ACETAMINOPHEN 1000 MG: 500 TABLET, FILM COATED ORAL at 13:51

## 2025-07-29 RX ADMIN — OXYCODONE HYDROCHLORIDE 2.5 MG: 5 TABLET ORAL at 20:35

## 2025-07-29 RX ADMIN — ACETAMINOPHEN 1000 MG: 500 TABLET, FILM COATED ORAL at 05:44

## 2025-07-29 RX ADMIN — FLUOXETINE HYDROCHLORIDE 40 MG: 20 CAPSULE ORAL at 08:44

## 2025-07-29 RX ADMIN — ACETAMINOPHEN 650 MG: 325 TABLET ORAL at 16:19

## 2025-07-29 RX ADMIN — ENOXAPARIN SODIUM 40 MG: 40 INJECTION SUBCUTANEOUS at 12:03

## 2025-07-29 RX ADMIN — METOPROLOL SUCCINATE 25 MG: 25 TABLET, EXTENDED RELEASE ORAL at 20:34

## 2025-07-29 RX ADMIN — ACETAMINOPHEN 1000 MG: 500 TABLET, FILM COATED ORAL at 22:42

## 2025-07-29 RX ADMIN — PANTOPRAZOLE SODIUM 40 MG: 40 TABLET, DELAYED RELEASE ORAL at 08:44

## 2025-07-29 RX ADMIN — TAMSULOSIN HYDROCHLORIDE 0.4 MG: 0.4 CAPSULE ORAL at 20:35

## 2025-07-29 RX ADMIN — METOPROLOL SUCCINATE 25 MG: 25 TABLET, EXTENDED RELEASE ORAL at 08:44

## 2025-07-29 ASSESSMENT — ACTIVITIES OF DAILY LIVING (ADL)
ADLS_ACUITY_SCORE: 51
ADLS_ACUITY_SCORE: 57
ADLS_ACUITY_SCORE: 55
ADLS_ACUITY_SCORE: 55
ADLS_ACUITY_SCORE: 57
ADLS_ACUITY_SCORE: 55
ADLS_ACUITY_SCORE: 51
ADLS_ACUITY_SCORE: 57
ADLS_ACUITY_SCORE: 55
ADLS_ACUITY_SCORE: 57
ADLS_ACUITY_SCORE: 55
ADLS_ACUITY_SCORE: 57
ADLS_ACUITY_SCORE: 57
ADLS_ACUITY_SCORE: 55
ADLS_ACUITY_SCORE: 50
ADLS_ACUITY_SCORE: 57
ADLS_ACUITY_SCORE: 51
ADLS_ACUITY_SCORE: 51
ADLS_ACUITY_SCORE: 55
ADLS_ACUITY_SCORE: 57
ADLS_ACUITY_SCORE: 50

## 2025-07-29 NOTE — PROGRESS NOTES
Care Management Follow Up    Length of Stay (days): 7    Expected Discharge Date: 07/29/2025     Concerns to be Addressed: discharge planning     Patient plan of care discussed at interdisciplinary rounds: Yes    Anticipated Discharge Disposition: Transitional Care              Anticipated Discharge Services: None  Anticipated Discharge DME:      Patient/family educated on Medicare website which has current facility and service quality ratings: yes  Education Provided on the Discharge Plan:    Patient/Family in Agreement with the Plan: yes    Referrals Placed by CM/SW: Post Acute Facilities  Private pay costs discussed: Not applicable    Discussed  Partnership in Safe Discharge Planning  document with patient/family: No     Handoff Completed: No, handoff not indicated or clinically appropriate    Additional Information:  CM spoke to patient about TCU's not wanting to accept because of lack of progress. CM inquired if there were more TCU's to choose to send additional referrals. Patient instructed to call his wife.  CM left message with wife for more TCU choices.    Addendum 1135  CM received call back from wife and she recommends any TCU that will accept him. Additional referrals sent to [facility], [facility].  CM to monitor for acceptance.    Addendum 1410  [facility] accepted for [facility].  CM left message at the TCU accepting this bed. CM left message for wife to inform of acceptance and to discuss transportation.Reviewed out of pocket cost for [facility] transport, $99.20 base and $6.38 per mile to the destination on the message. Requested call back.       Mercedes Tafoya, RN, BSN, CM  Inpatient Care Coordination  [facility]  [phone]

## 2025-07-29 NOTE — CONSULTS
"NUTRITION BRIEF NOTE      REASON FOR NUTRITION BRIEF NOTE:  Chart check at length of stay (LOS) per policy.    CHART CHECK:  - Per review of flowsheets, patient is consuming % of meals and ordering meals consistently per meal system review.    - There is no current documentation of edema.  No wt loss based on review of available wt trends outlined below.  Wt Readings from Last 10 Encounters:   07/22/25 (!) 146.1 kg (322 lb 1.5 oz)   05/29/25 (!) 142.9 kg (315 lb)   03/08/24 142.9 kg (315 lb)   07/23/20 142.8 kg (314 lb 13.1 oz)   09/24/18 136.1 kg (300 lb)   12/18/17 (!) 141.1 kg (311 lb)   12/04/17 (!) 141.1 kg (311 lb)   11/10/17 (!) 141.1 kg (311 lb)   08/14/17 (!) 144.2 kg (318 lb)   08/03/17 (!) 143.8 kg (317 lb)     - No documentation of pressure injury.    FOLLOW UP:  Will re-check chart in 7-10 days per policy, unless formally consulted in the interim.  Please formally consult if needs arise.      Denise Chacon RDN, , LD  Clinical Dietitian  [facility] Message Group: \"Dietitian [Leah]\"  Office: [phone]    "

## 2025-07-29 NOTE — PROGRESS NOTES
[facility]    Medicine Progress Note - Hospitalist Service    Date of Admission:  7/22/2025    Assessment & Plan   Leodan Yanez is a 74 year old male with past medical history significant for AAA, atrial fibrillation, hypertension, hyperlipidemia, BPH, MONSE, GERD, insomnia, neuropathy, glaucoma, and nephrolithiasis who presented to [facility] on 7/22/2025 after a fall and was found to have displaced intertrochanteric proximal left femur fracture s/p ORIF on 7/23/2025.    PT recommending TCU.  Awaiting placement.        Comminuted, Displaced, Left, Intertrochanteric/Subtrochanteric, Proximal Femur Fracture s/p ORIF   Acute blood loss anemia related to surgery:  Presented after a mechanical fall.  Imaging with comminuted displaced intertrochanteric/subtrochanteric fracture of the proximal left femur.  Orthopedic Surgery consulted and took patient to OR for ORIF with intramedullary nail on 7/23/25.  -Doing well post-operatively  -Post-op site cares, activity restrictions, pain medications, DVT proph per orthopedics  -PT/OT consulted  -PT recommending TCU at this time, awaiting placement    L>R BLE edema:  Patient reports chronic right leg extremity.  Suspect new LLE edema in the setting of fracture and op repair.    -Lasix IV 40 x1, reassess need daily   -Daily weights     Hypokalemia, resolved  Hypomagnesemia, resolved  Hypocalcemia, resolved:  -Electrolytes replaced per protocol and have normalized  -Holding PTA hydrochlorothiazide       Chronic Medical Problems  Chronic Parox Atrial Fibrillation  HTN:  On ASA PTA 81mg daily; not on anticoagulation otherwise.  Pt with hx of GI bleed.  BP low normal post-op and so BP meds initially held.    -ASA on hold while we use lovenox for vte ppx  -BP trending up 7/27/25 - metoprolol resumed at 25mg po bid with holding parameters (Baseline on 200 mg XL/day).  -Continue to hold PTA norvasc (10mg), lisinopril 20mg and hydrochlorothiazide  "(12.5mg) for now     Chronic Peripheral Neuropathy:  Hx of BLE neuropathy with reported poor balance at baseline.  Uses a cane.   -Continue PTA Amitriptyline     HLD - continue Pravastatin     GERD - continue Protonix     BPH - continue Flomax     STEPHEN - continue Fluoxetine     Hx AAA - Mild ascending aorta dilatation (4.1 cm) noted on echo 2019.  Outpt monitoring.     MONSE - continue cpap     Obesity - hx Mirella en Y gastric bypass          Diet: Regular Diet Adult    DVT Prophylaxis: Enoxaparin (Lovenox) SQ  Linder Catheter: Not present  Lines: None     Cardiac Monitoring: None  Code Status: Full Code      Clinically Significant Risk Factors                   # Hypertension: Noted on problem list            # Morbid Obesity: Estimated body mass index is 42.49 kg/m  as calculated from the following:    Height as of this encounter: 1.854 m (6' 1\").    Weight as of this encounter: 146.1 kg (322 lb 1.5 oz).      # Financial/Environmental Concerns: none         Social Drivers of Health    Depression: At risk (5/29/2025)    PHQ-2     PHQ-2 Score: 3          Disposition Plan     Medically Ready for Discharge: Ready Now             Collins Brady DO  Hospitalist Service  [facility]  Securely message with [employer] (more info)  Text page via [employer] Paging/Directory   ______________________________________________________________________    Interval History   No acute events overnight.  The patient endorses ongoing difficulty with ambulation.  We discussed the need to get up and ambulate more prior to dispo to TCU.  The patient was understanding.      Physical Exam   Vital Signs: Temp: 97.4  F (36.3  C) Temp src: Temporal BP: 117/76 Pulse: 74   Resp: 18 SpO2: 99 % O2 Device: None (Room air)    Weight: 322 lbs 1.47 oz    General Appearance: Patient awake & alert.  No apparent distress.   Respiratory: Lungs are CTAB.  Work of breathing appears normal on room air.  Cardiovascular: Regular rate and rhythm.  No " murmurs rubs or gallops.  2+ pitting edema to the left (surgical) leg.  Chronic 1+ pitting edema to the RLE.  GI: Benign.  Soft.  Non-tender.  Bowel sounds active.   Skin: No rashes or lesions exposed skin.  Surgical site is edematous and swollen.  Dressing is CDI.   Neuro:  The patient is moving all extremities.  No obvious focal asymmetries.   Other: Patient is appropriate.    Medical Decision Making       45 MINUTES SPENT BY ME on the date of service doing chart review, history, exam, documentation & further activities per the note.      Data   ------------------------- PAST 24 HR DATA REVIEWED -----------------------------------------------        Imaging results reviewed over the past 24 hrs:   No results found for this or any previous visit (from the past 24 hours).

## 2025-07-29 NOTE — PROVIDER NOTIFICATION
07/29/25 0025   Tech Time   $Tech Time (10 minute increments) 3   Mode: CPAP/ BiPAP/ AVAPS/ AVAPS AE   CPAP/BiPAP/ AVAPS/ AVAPS AE Mode CPAP   Equipment   Device Resmed   CPAP/BiPAP/Settings   $CPAP/BiPAP Subsequent completed   BIPAP/CPAP On Standby On   Oxygen (%) 21   CPAP/BiPAP Patient Parameters   CPAP (cm H2O) 14 cmh2o   RR Total (breaths/min) 18 breaths/min   CPAP/BiPAP/AVAPS/AVAPS AE Alarms   Humidifier Checked N/A   RT Device Skin Assessment   Oxygen Delivery Device CPAP/BiPAP Mask   Interface Face Mask - Large   Ventilator Arm In Place No   Site Appearance neck circumference Clean and dry   Site Appearance bridge of nose Clean and dry   Site Appearance occiput Clean and dry   Strap Tightness Finger Allowance between head and device strap   Device Skin Interventions Taken No adjustments needed   Respiratory WDL   Respiratory WDL X   Rhythm/Pattern, Respiratory assisted mechanically   Expansion/Accessory Muscles/Retractions expansion symmetric;no retractions;no use of accessory muscles     Mukul Kwong, RT on 7/29/2025 at 12:26 AM

## 2025-07-29 NOTE — PLAN OF CARE
"Goal Outcome Evaluation:      Plan of Care Reviewed With: patient    Overall Patient Progress: improvingOverall Patient Progress: improving    Outcome Evaluation: A&Ox4. VSS. Assist x 2 Ngozi Steady. On room air. Uses CPAP at night. LS clear and diminished. LE edema present. CMS intact. Dressing intact. Pain management, Scheduled Tylenol and PRN Oxycodone 5 mg. Awaiting TCU placement.      Problem: Adult Inpatient Plan of Care  Goal: Plan of Care Review  Description: The Plan of Care Review/Shift note should be completed every shift.  The Outcome Evaluation is a brief statement about your assessment that the patient is improving, declining, or no change.  This information will be displayed automatically on your shift  note.  Outcome: Progressing  Flowsheets (Taken 7/29/2025 1416)  Outcome Evaluation: A&Ox4. VSS. Assist x 2 Ngozi Steady. On room air. Uses CPAP at night. LS clear and diminished. LE edema present. CMS intact. Dressing intact. Pain management, Scheduled Tylenol and PRN Oxycodone 5 mg. Awaiting TCU placement.  Plan of Care Reviewed With: patient  Overall Patient Progress: improving  Goal: Patient-Specific Goal (Individualized)  Description: You can add care plan individualizations to a care plan. Examples of Individualization might be:  \"Parent requests to be called daily at 9am for status\", \"I have a hard time hearing out of my right ear\", or \"Do not touch me to wake me up as it startles  me\".  Outcome: Progressing  Goal: Absence of Hospital-Acquired Illness or Injury  Outcome: Progressing  Intervention: Identify and Manage Fall Risk  Recent Flowsheet Documentation  Taken 7/29/2025 0829 by Hui Barajas, RN  Safety Promotion/Fall Prevention:   activity supervised   clutter free environment maintained   nonskid shoes/slippers when out of bed   safety round/check completed  Intervention: Prevent Skin Injury  Recent Flowsheet Documentation  Taken 7/29/2025 0829 by Hui Barajas, RN  Body Position: supine, " head elevated  Intervention: Prevent and Manage VTE (Venous Thromboembolism) Risk  Recent Flowsheet Documentation  Taken 7/29/2025 0829 by Hui Barajas RN  VTE Prevention/Management: foot pump device on  Intervention: Prevent Infection  Recent Flowsheet Documentation  Taken 7/29/2025 0829 by Hui Barajas, RN  Infection Prevention:   hand hygiene promoted   single patient room provided  Goal: Optimal Comfort and Wellbeing  Outcome: Progressing  Intervention: Provide Person-Centered Care  Recent Flowsheet Documentation  Taken 7/29/2025 0829 by Hui Barajas RN  Trust Relationship/Rapport: care explained  Goal: Readiness for Transition of Care  Outcome: Progressing

## 2025-07-29 NOTE — PLAN OF CARE
"Goal Outcome Evaluation:      Plan of Care Reviewed With: patient    Overall Patient Progress: improvingOverall Patient Progress: improving    Outcome Evaluation: Patient alert and oriented, denies pain over night scheduled tylenol given, on RA, LS dim, cpap over night, continent uses urinal, dressings to LLE proximal incision old drainage noted, distal incision also has some old drainage, baseline N/T, edema LLE calf, pulses present, patient awaiting TCU placement.        Problem: Adult Inpatient Plan of Care  Goal: Plan of Care Review  Description: The Plan of Care Review/Shift note should be completed every shift.  The Outcome Evaluation is a brief statement about your assessment that the patient is improving, declining, or no change.  This information will be displayed automatically on your shift  note.  Outcome: Progressing  Flowsheets (Taken 7/29/2025 0906)  Outcome Evaluation: Patient alert and oriented, denies pain over night scheduled tylenol given, on RA, LS dim, cpap over night, continent uses urinal, dressings to LLE proximal incision old drainage noted, distal incision also has some old drainage, baseline N/T, edema LLE calf, pulses present, patient awaiting TCU placement.  Plan of Care Reviewed With: patient  Overall Patient Progress: improving  Goal: Patient-Specific Goal (Individualized)  Description: You can add care plan individualizations to a care plan. Examples of Individualization might be:  \"Parent requests to be called daily at 9am for status\", \"I have a hard time hearing out of my right ear\", or \"Do not touch me to wake me up as it startles  me\".  Outcome: Progressing  Goal: Absence of Hospital-Acquired Illness or Injury  Outcome: Progressing  Intervention: Identify and Manage Fall Risk  Recent Flowsheet Documentation  Taken 7/28/2025 5758 by Altagracia Juarez, RN  Safety Promotion/Fall Prevention:   assistive device/personal items within reach   safety round/check completed  Intervention: " Prevent Skin Injury  Recent Flowsheet Documentation  Taken 7/28/2025 2340 by Altagracia Juarez RN  Body Position: supine, head elevated  Skin Protection: incontinence pads utilized  Intervention: Prevent and Manage VTE (Venous Thromboembolism) Risk  Recent Flowsheet Documentation  Taken 7/28/2025 2340 by Altagracia Juarez RN  VTE Prevention/Management: patient refused intervention  Intervention: Prevent Infection  Recent Flowsheet Documentation  Taken 7/28/2025 2340 by Altagracia Juarez RN  Infection Prevention:   single patient room provided   rest/sleep promoted  Goal: Optimal Comfort and Wellbeing  Outcome: Progressing  Intervention: Provide Person-Centered Care  Recent Flowsheet Documentation  Taken 7/28/2025 2340 by Altagracia Juarez RN  Trust Relationship/Rapport: care explained  Goal: Readiness for Transition of Care  Outcome: Progressing     Problem: Delirium  Goal: Optimal Coping  Outcome: Progressing  Goal: Improved Behavioral Control  Outcome: Progressing  Intervention: Minimize Safety Risk  Recent Flowsheet Documentation  Taken 7/28/2025 2340 by Altagracia Juarez RN  Enhanced Safety Measures:   assistive devices when indicated   room near unit station  Trust Relationship/Rapport: care explained  Goal: Improved Attention and Thought Clarity  Outcome: Progressing  Goal: Improved Sleep  Outcome: Progressing  Intervention: Promote Sleep  Recent Flowsheet Documentation  Taken 7/28/2025 2340 by Altagracia Juarez RN  Sleep/Rest Enhancement:   awakenings minimized   noise level reduced     Problem: Hip Fracture Surgical Repair  Goal: Optimal Coping with Change in Health Status  Outcome: Progressing  Goal: Absence of Bleeding  Outcome: Progressing  Intervention: Monitor and Manage Bleeding  Recent Flowsheet Documentation  Taken 7/28/2025 2340 by Altagracia Juarez RN  Bleeding Management: dressing monitored  Goal: Effective Bowel Elimination  Outcome: Progressing  Intervention: Enhance Bowel Motility and  Elimination  Recent Flowsheet Documentation  Taken 7/28/2025 2340 by Altagracia Juarez RN  Bowel Elimination Promotion: adequate fluid intake promoted  Goal: Cognitive Function Maintained  Outcome: Progressing  Intervention: Maintain Cognitive Function  Recent Flowsheet Documentation  Taken 7/28/2025 2340 by Altagracia Juarez RN  Sleep/Rest Enhancement:   awakenings minimized   noise level reduced  Goal: Fluid and Electrolyte Balance  Outcome: Progressing  Goal: Absence of Infection Signs and Symptoms  Outcome: Progressing  Goal: Optimal Functional Ability  Outcome: Progressing  Intervention: Protect Joint Integrity  Recent Flowsheet Documentation  Taken 7/28/2025 2340 by Altagracia Juarez RN  Compartment Syndrome Management: active flexion/extension encouraged  Intervention: Promote Optimal Functional Status  Recent Flowsheet Documentation  Taken 7/28/2025 2340 by Altagracia Juarez RN  Self-Care Promotion: independence encouraged  Activity Management: activity adjusted per tolerance  Goal: Anesthesia/Sedation Recovery  Outcome: Progressing  Intervention: Optimize Anesthesia Recovery  Recent Flowsheet Documentation  Taken 7/28/2025 2340 by Altagracia Juarez RN  Safety Promotion/Fall Prevention:   assistive device/personal items within reach   safety round/check completed  Goal: Optimal Pain Control and Function  Outcome: Progressing  Goal: Nausea and Vomiting Relief  Outcome: Progressing  Goal: Effective Urinary Elimination  Outcome: Progressing  Goal: Effective Oxygenation and Ventilation  Outcome: Progressing  Intervention: Optimize Oxygenation and Ventilation  Recent Flowsheet Documentation  Taken 7/28/2025 2340 by Altagracia Juarez RN  Head of Bed (HOB) Positioning: HOB at 30 degrees

## 2025-07-30 ENCOUNTER — APPOINTMENT (OUTPATIENT)
Dept: PHYSICAL THERAPY | Facility: CLINIC | Age: 75
DRG: 481 | End: 2025-07-30
Payer: COMMERCIAL

## 2025-07-30 LAB — GLUCOSE BLDC GLUCOMTR-MCNC: 229 MG/DL (ref 70–99)

## 2025-07-30 PROCEDURE — 94660 CPAP INITIATION&MGMT: CPT

## 2025-07-30 PROCEDURE — 250N000013 HC RX MED GY IP 250 OP 250 PS 637: Performed by: STUDENT IN AN ORGANIZED HEALTH CARE EDUCATION/TRAINING PROGRAM

## 2025-07-30 PROCEDURE — 97530 THERAPEUTIC ACTIVITIES: CPT | Mod: GP

## 2025-07-30 PROCEDURE — 250N000013 HC RX MED GY IP 250 OP 250 PS 637: Performed by: INTERNAL MEDICINE

## 2025-07-30 PROCEDURE — 250N000011 HC RX IP 250 OP 636: Performed by: PHYSICIAN ASSISTANT

## 2025-07-30 PROCEDURE — 999N000157 HC STATISTIC RCP TIME EA 10 MIN

## 2025-07-30 PROCEDURE — 120N000001 HC R&B MED SURG/OB

## 2025-07-30 RX ORDER — ASPIRIN 81 MG/1
81 TABLET, CHEWABLE ORAL DAILY
DISCHARGE
Start: 2025-09-10

## 2025-07-30 RX ORDER — FUROSEMIDE 10 MG/ML
40 INJECTION INTRAMUSCULAR; INTRAVENOUS ONCE
Status: DISCONTINUED | OUTPATIENT
Start: 2025-07-30 | End: 2025-07-30

## 2025-07-30 RX ORDER — FUROSEMIDE 40 MG/1
40 TABLET ORAL ONCE
Status: COMPLETED | OUTPATIENT
Start: 2025-07-30 | End: 2025-07-30

## 2025-07-30 RX ORDER — ASPIRIN 81 MG/1
81 TABLET, CHEWABLE ORAL 2 TIMES DAILY
DISCHARGE
Start: 2025-07-30 | End: 2025-09-10

## 2025-07-30 RX ADMIN — ACETAMINOPHEN 1000 MG: 500 TABLET, FILM COATED ORAL at 05:42

## 2025-07-30 RX ADMIN — METHOCARBAMOL 500 MG: 500 TABLET ORAL at 13:36

## 2025-07-30 RX ADMIN — AMITRIPTYLINE HYDROCHLORIDE 75 MG: 25 TABLET, FILM COATED ORAL at 20:27

## 2025-07-30 RX ADMIN — ENOXAPARIN SODIUM 40 MG: 40 INJECTION SUBCUTANEOUS at 13:37

## 2025-07-30 RX ADMIN — SENNOSIDES AND DOCUSATE SODIUM 1 TABLET: 50; 8.6 TABLET ORAL at 08:40

## 2025-07-30 RX ADMIN — ACETAMINOPHEN 1000 MG: 500 TABLET, FILM COATED ORAL at 21:08

## 2025-07-30 RX ADMIN — ACETAMINOPHEN 1000 MG: 500 TABLET, FILM COATED ORAL at 13:36

## 2025-07-30 RX ADMIN — METOPROLOL SUCCINATE 25 MG: 25 TABLET, EXTENDED RELEASE ORAL at 08:40

## 2025-07-30 RX ADMIN — FUROSEMIDE 40 MG: 40 TABLET ORAL at 15:36

## 2025-07-30 RX ADMIN — METOPROLOL SUCCINATE 25 MG: 25 TABLET, EXTENDED RELEASE ORAL at 20:27

## 2025-07-30 RX ADMIN — TAMSULOSIN HYDROCHLORIDE 0.4 MG: 0.4 CAPSULE ORAL at 20:27

## 2025-07-30 RX ADMIN — FLUOXETINE HYDROCHLORIDE 40 MG: 20 CAPSULE ORAL at 08:40

## 2025-07-30 RX ADMIN — PANTOPRAZOLE SODIUM 40 MG: 40 TABLET, DELAYED RELEASE ORAL at 08:40

## 2025-07-30 ASSESSMENT — ACTIVITIES OF DAILY LIVING (ADL)
ADLS_ACUITY_SCORE: 51
ADLS_ACUITY_SCORE: 50
ADLS_ACUITY_SCORE: 51
ADLS_ACUITY_SCORE: 50
ADLS_ACUITY_SCORE: 50
ADLS_ACUITY_SCORE: 51
ADLS_ACUITY_SCORE: 51
ADLS_ACUITY_SCORE: 50
ADLS_ACUITY_SCORE: 51
ADLS_ACUITY_SCORE: 51
ADLS_ACUITY_SCORE: 50

## 2025-07-30 NOTE — PLAN OF CARE
"Goal Outcome Evaluation: Pt still requires moderate assist x2 with Ngozi Steady for transfers. Not able to stand for long periods. Gait unsteady. Reported slight dizziness with standing. Endorses pain during transfer to  bed. Mild pain at rest.       Plan of Care Reviewed With: patient    Overall Patient Progress: improving        Patient vital signs are at baseline: Yes  Patient able to ambulate as they were prior to admission or with assist devices provided by therapies during their stay:  No,  Reason:  Pt still requires moderate assist x2 with Ngozi steady for transfers.  Patient MUST void prior to discharge:  Yes  Patient able to tolerate oral intake:  Yes  Pain has adequate pain control using Oral analgesics:  Yes, Tylenol, oxycodone, robaxin, ice packs.  Does patient have an identified :  Yes, wife  Has goal D/C date and time been discussed with patient:  No,  Reason:  Pending improvement in ambulation.       Problem: Adult Inpatient Plan of Care  Goal: Plan of Care Review  Description: The Plan of Care Review/Shift note should be completed every shift.  The Outcome Evaluation is a brief statement about your assessment that the patient is improving, declining, or no change.  This information will be displayed automatically on your shift  note.  Outcome: Progressing  Flowsheets (Taken 7/29/2025 2038)  Plan of Care Reviewed With: patient  Overall Patient Progress: improving  Goal: Patient-Specific Goal (Individualized)  Description: You can add care plan individualizations to a care plan. Examples of Individualization might be:  \"Parent requests to be called daily at 9am for status\", \"I have a hard time hearing out of my right ear\", or \"Do not touch me to wake me up as it startles  me\".  Outcome: Progressing  Goal: Absence of Hospital-Acquired Illness or Injury  Outcome: Progressing  Intervention: Identify and Manage Fall Risk  Recent Flowsheet Documentation  Taken 7/29/2025 1606 by Anita Lira RN  Safety " Promotion/Fall Prevention:   assistive device/personal items within reach   activity supervised   clutter free environment maintained   nonskid shoes/slippers when out of bed   room organization consistent   safety round/check completed   mobility aid in reach  Intervention: Prevent and Manage VTE (Venous Thromboembolism) Risk  Recent Flowsheet Documentation  Taken 7/29/2025 1606 by Anita Lira RN  VTE Prevention/Management: (pt sitting in chair at this time.) other (see comments)  Intervention: Prevent Infection  Recent Flowsheet Documentation  Taken 7/29/2025 1606 by Anita Lira RN  Infection Prevention:   rest/sleep promoted   single patient room provided  Goal: Optimal Comfort and Wellbeing  Outcome: Progressing  Intervention: Provide Person-Centered Care  Recent Flowsheet Documentation  Taken 7/29/2025 1606 by Anita Lira RN  Trust Relationship/Rapport:   questions answered   questions encouraged   thoughts/feelings acknowledged   care explained  Goal: Readiness for Transition of Care  Outcome: Progressing     Problem: Delirium  Goal: Optimal Coping  Outcome: Progressing  Intervention: Optimize Psychosocial Adjustment to Delirium  Recent Flowsheet Documentation  Taken 7/29/2025 1606 by Anita Lira RN  Family/Support System Care: self-care encouraged  Goal: Improved Behavioral Control  Outcome: Progressing  Intervention: Minimize Safety Risk  Recent Flowsheet Documentation  Taken 7/29/2025 1606 by Anita Lira, RN  Enhanced Safety Measures:   patient/family teach back on injury risk   review medications for side effects with activity   Pre/Post Op education on fall prevention   pain management  Trust Relationship/Rapport:   questions answered   questions encouraged   thoughts/feelings acknowledged   care explained  Goal: Improved Attention and Thought Clarity  Outcome: Progressing  Intervention: Maximize Cognitive Function  Recent Flowsheet Documentation  Taken 7/29/2025 1606 by Anita Lira  RN  Reorientation Measures: clock in view  Goal: Improved Sleep  Outcome: Progressing  Intervention: Promote Sleep  Recent Flowsheet Documentation  Taken 7/29/2025 1606 by Anita Lira RN  Sleep/Rest Enhancement: relaxation techniques promoted     Problem: Hip Fracture Surgical Repair  Goal: Optimal Coping with Change in Health Status  Outcome: Progressing  Intervention: Support Psychosocial Response to Surgery and Mobility Changes  Recent Flowsheet Documentation  Taken 7/29/2025 1606 by Anita Lira RN  Family/Support System Care: self-care encouraged  Goal: Absence of Bleeding  Outcome: Progressing  Intervention: Monitor and Manage Bleeding  Recent Flowsheet Documentation  Taken 7/29/2025 1606 by Anita Lira RN  Bleeding Management: dressing monitored  Goal: Effective Bowel Elimination  Outcome: Progressing  Intervention: Enhance Bowel Motility and Elimination  Recent Flowsheet Documentation  Taken 7/29/2025 1606 by Anita Lira RN  Bowel Elimination Management: relaxation techniques promoted  Bowel Elimination Promotion:   adequate fluid intake promoted   ambulation promoted  Goal: Cognitive Function Maintained  Outcome: Progressing  Intervention: Maintain Cognitive Function  Recent Flowsheet Documentation  Taken 7/29/2025 1606 by Anita Lira RN  Reorientation Measures: clock in view  Sleep/Rest Enhancement: relaxation techniques promoted  Goal: Fluid and Electrolyte Balance  Outcome: Progressing  Goal: Absence of Infection Signs and Symptoms  Outcome: Progressing  Intervention: Prevent or Manage Infection  Recent Flowsheet Documentation  Taken 7/29/2025 1606 by Anita Lira RN  Infection Management: aseptic technique maintained  Goal: Optimal Functional Ability  Outcome: Not Progressing  Intervention: Protect Joint Integrity  Recent Flowsheet Documentation  Taken 7/29/2025 1606 by Anita Lira RN  Equipment: ice  Compartment Syndrome Management:   active flexion/extension encouraged    extremity placed at heart level  Intervention: Promote Optimal Functional Status  Recent Flowsheet Documentation  Taken 7/29/2025 1606 by Anita Lira, RN  Self-Care Promotion:   independence encouraged   BADL personal objects within reach  Activity Management:   activity adjusted per tolerance   activity encouraged   up in chair  Goal: Anesthesia/Sedation Recovery  Outcome: Progressing  Intervention: Optimize Anesthesia Recovery  Recent Flowsheet Documentation  Taken 7/29/2025 1606 by Anita Lira, RN  Safety Promotion/Fall Prevention:   assistive device/personal items within reach   activity supervised   clutter free environment maintained   nonskid shoes/slippers when out of bed   room organization consistent   safety round/check completed   mobility aid in reach  Reorientation Measures: clock in view  Incentive Spirometer Predicted Level (mL): 1800  Goal: Optimal Pain Control and Function  Outcome: Progressing  Goal: Nausea and Vomiting Relief  Outcome: Progressing  Intervention: Prevent or Manage Nausea and Vomiting  Recent Flowsheet Documentation  Taken 7/29/2025 1606 by Anita Lira, RN  Nausea/Vomiting Interventions: (denies) other (see comments)  Goal: Effective Urinary Elimination  Outcome: Progressing  Goal: Effective Oxygenation and Ventilation  Outcome: Progressing

## 2025-07-30 NOTE — PLAN OF CARE
"Goal Outcome Evaluation:      Plan of Care Reviewed With: patient    Overall Patient Progress: improvingOverall Patient Progress: improving    Outcome Evaluation: A&Ox4, VSS, assistx2 Ngozi Steady, RA, CPAP used overnight, LS diminished, pain managed with scheduled tylenol, CMS basline numbness, Dressings to left hip intact, Awaiting TCU placement      Problem: Adult Inpatient Plan of Care  Goal: Plan of Care Review  Description: The Plan of Care Review/Shift note should be completed every shift.  The Outcome Evaluation is a brief statement about your assessment that the patient is improving, declining, or no change.  This information will be displayed automatically on your shift  note.  Outcome: Progressing  Flowsheets (Taken 7/30/2025 0519)  Outcome Evaluation: A&Ox4, VSS, assistx2 Ngozi Steady, RA, CPAP used overnight, LS diminished, pain managed with scheduled tylenol, CMS basline numbness, Dressings to left hip intact, Awaiting TCU placement  Plan of Care Reviewed With: patient  Overall Patient Progress: improving  Goal: Patient-Specific Goal (Individualized)  Description: You can add care plan individualizations to a care plan. Examples of Individualization might be:  \"Parent requests to be called daily at 9am for status\", \"I have a hard time hearing out of my right ear\", or \"Do not touch me to wake me up as it startles  me\".  Outcome: Progressing  Goal: Absence of Hospital-Acquired Illness or Injury  Outcome: Progressing  Intervention: Identify and Manage Fall Risk  Recent Flowsheet Documentation  Taken 7/30/2025 0100 by Altagracia Juarez RN  Safety Promotion/Fall Prevention:   assistive device/personal items within reach   safety round/check completed  Intervention: Prevent Skin Injury  Recent Flowsheet Documentation  Taken 7/30/2025 0100 by Altagracia Juarez RN  Body Position: supine, head elevated  Skin Protection: incontinence pads utilized  Intervention: Prevent and Manage VTE (Venous Thromboembolism) " Risk  Recent Flowsheet Documentation  Taken 7/30/2025 0100 by Altagracia Juarez RN  VTE Prevention/Management: SCDs on (sequential compression devices)  Intervention: Prevent Infection  Recent Flowsheet Documentation  Taken 7/30/2025 0100 by Altagracia Juarez RN  Infection Prevention:   single patient room provided   rest/sleep promoted  Goal: Optimal Comfort and Wellbeing  Outcome: Progressing  Intervention: Provide Person-Centered Care  Recent Flowsheet Documentation  Taken 7/30/2025 0100 by Altagracia Juarez RN  Trust Relationship/Rapport: care explained  Goal: Readiness for Transition of Care  Outcome: Progressing     Problem: Delirium  Goal: Optimal Coping  Outcome: Progressing  Goal: Improved Behavioral Control  Outcome: Progressing  Intervention: Minimize Safety Risk  Recent Flowsheet Documentation  Taken 7/30/2025 0100 by Altagracia Juarez RN  Enhanced Safety Measures:   assistive devices when indicated   room near unit station  Trust Relationship/Rapport: care explained  Goal: Improved Attention and Thought Clarity  Outcome: Progressing  Goal: Improved Sleep  Outcome: Progressing  Intervention: Promote Sleep  Recent Flowsheet Documentation  Taken 7/30/2025 0100 by Altagracia Juarez RN  Sleep/Rest Enhancement:   awakenings minimized   noise level reduced     Problem: Hip Fracture Surgical Repair  Goal: Optimal Coping with Change in Health Status  Outcome: Progressing  Goal: Absence of Bleeding  Outcome: Progressing  Intervention: Monitor and Manage Bleeding  Recent Flowsheet Documentation  Taken 7/30/2025 0100 by Altagracia Juarez RN  Bleeding Management: dressing monitored  Goal: Effective Bowel Elimination  Outcome: Progressing  Intervention: Enhance Bowel Motility and Elimination  Recent Flowsheet Documentation  Taken 7/30/2025 0100 by Altagracia Juarez RN  Bowel Motility Enhancement: fluid intake encouraged  Bowel Elimination Promotion: adequate fluid intake promoted  Goal: Cognitive Function  Maintained  Outcome: Progressing  Intervention: Maintain Cognitive Function  Recent Flowsheet Documentation  Taken 7/30/2025 0100 by Altagracia Juarez RN  Sleep/Rest Enhancement:   awakenings minimized   noise level reduced  Goal: Fluid and Electrolyte Balance  Outcome: Progressing  Goal: Absence of Infection Signs and Symptoms  Outcome: Progressing  Goal: Optimal Functional Ability  Outcome: Progressing  Intervention: Protect Joint Integrity  Recent Flowsheet Documentation  Taken 7/30/2025 0100 by Altagracia Juarez RN  Compartment Syndrome Management: active flexion/extension encouraged  Intervention: Promote Optimal Functional Status  Recent Flowsheet Documentation  Taken 7/30/2025 0100 by Altagracia Juarez RN  Self-Care Promotion: independence encouraged  Activity Management: activity adjusted per tolerance  Goal: Anesthesia/Sedation Recovery  Outcome: Progressing  Intervention: Optimize Anesthesia Recovery  Recent Flowsheet Documentation  Taken 7/30/2025 0100 by Altagracia Juarez RN  Safety Promotion/Fall Prevention:   assistive device/personal items within reach   safety round/check completed  Goal: Optimal Pain Control and Function  Outcome: Progressing  Goal: Nausea and Vomiting Relief  Outcome: Progressing  Goal: Effective Urinary Elimination  Outcome: Progressing  Goal: Effective Oxygenation and Ventilation  Outcome: Progressing  Intervention: Optimize Oxygenation and Ventilation  Recent Flowsheet Documentation  Taken 7/30/2025 0100 by Altagracia Juarez RN  Head of Bed (HOB) Positioning: HOB at 30 degrees

## 2025-07-30 NOTE — PROGRESS NOTES
[facility]    Medicine Progress Note - Hospitalist Service    Date of Admission:  7/22/2025    Assessment & Plan   Leodan Yanez is a 74 year old male with past medical history significant for AAA, atrial fibrillation, hypertension, hyperlipidemia, BPH, MONSE, GERD, insomnia, neuropathy, glaucoma, and nephrolithiasis who presented to [facility] on 7/22/2025 after a fall and was found to have displaced intertrochanteric proximal left femur fracture s/p ORIF on 7/23/2025.    PT recommending TCU.  Awaiting placement.        Comminuted, Displaced, Left, Intertrochanteric/Subtrochanteric, Proximal Femur Fracture s/p ORIF   Acute blood loss anemia related to surgery:  Presented after a mechanical fall.  Imaging with comminuted displaced intertrochanteric/subtrochanteric fracture of the proximal left femur.  Orthopedic Surgery consulted and took patient to OR for ORIF with intramedullary nail on 7/23/25.  -Doing well post-operatively  -Post-op site cares, activity restrictions, pain medications, DVT proph per orthopedics  -PT/OT consulted  -PT recommending TCU at this time, awaiting placement    L>R BLE edema:  Patient reports chronic right leg extremity.  Suspect new LLE edema in the setting of fracture and op repair.    -Lasix IV 40 again, reassess need daily   -Daily weights     Hypokalemia, resolved  Hypomagnesemia, resolved  Hypocalcemia, resolved:  -Electrolytes replaced per protocol and have normalized  -Holding PTA hydrochlorothiazide       Chronic Medical Problems  Chronic Parox Atrial Fibrillation  HTN:  On ASA PTA 81mg daily; not on anticoagulation otherwise.  Pt with hx of GI bleed.  BP low normal post-op and so BP meds initially held.    -ASA on hold while we use lovenox for vte ppx  -BP trending up 7/27/25 - metoprolol resumed at 25mg po bid with holding parameters (Baseline on 200 mg XL/day).  -Continue to hold PTA norvasc (10mg), lisinopril 20mg and hydrochlorothiazide  "(12.5mg) for now     Chronic Peripheral Neuropathy:  Hx of BLE neuropathy with reported poor balance at baseline.  Uses a cane.   -Continue PTA Amitriptyline     HLD - Holding PTA pravastatin in the setting of weakness     GERD - continue Protonix     BPH - continue Flomax     STEPHEN - continue Fluoxetine     Hx AAA - Mild ascending aorta dilatation (4.1 cm) noted on echo 2019.  Outpt monitoring.     MONSE - continue cpap     Obesity - hx Mirella en Y gastric bypass          Diet: Regular Diet Adult  Diet    DVT Prophylaxis: Enoxaparin (Lovenox) SQ  Linder Catheter: Not present  Lines: None     Cardiac Monitoring: None  Code Status: Full Code      Clinically Significant Risk Factors                   # Hypertension: Noted on problem list            # Morbid Obesity: Estimated body mass index is 41.86 kg/m  as calculated from the following:    Height as of this encounter: 1.854 m (6' 1\").    Weight as of this encounter: 143.9 kg (317 lb 3.9 oz).        # Financial/Environmental Concerns: none         Social Drivers of Health    Depression: At risk (5/29/2025)    PHQ-2     PHQ-2 Score: 3          Disposition Plan     Medically Ready for Discharge: Ready Now             Collins Brady DO  Hospitalist Service  [facility]  Securely message with [employer] (more info)  Text page via [employer] Paging/Directory   ______________________________________________________________________    Interval History   No acute events overnight.  Patient doing well.  Still with weakness when getting up on left leg.     Physical Exam   Vital Signs: Temp: 97.4  F (36.3  C) Temp src: Temporal BP: 127/74 Pulse: 80   Resp: 20 SpO2: 96 % O2 Device: None (Room air)    Weight: 317 lbs 3.87 oz    General Appearance: Patient awake & alert.  No apparent distress.   Respiratory: Lungs are CTAB.  Work of breathing appears normal on room air.  Cardiovascular: Regular rate and rhythm.  No murmurs rubs or gallops.  2+ pitting edema to the left " (surgical) leg.  Chronic 1+ pitting edema to the RLE.  GI: Benign.  Soft.  Non-tender.  Bowel sounds active.   Skin: No rashes or lesions exposed skin.  Surgical site is edematous and swollen.  Dressing is CDI.   Neuro:  The patient is moving all extremities.  No obvious focal asymmetries.   Other: Patient is appropriate.    Medical Decision Making       45 MINUTES SPENT BY ME on the date of service doing chart review, history, exam, documentation & further activities per the note.      Data   ------------------------- PAST 24 HR DATA REVIEWED -----------------------------------------------        Imaging results reviewed over the past 24 hrs:   No results found for this or any previous visit (from the past 24 hours).

## 2025-07-30 NOTE — PROGRESS NOTES
Care Management Follow Up    Length of Stay (days): 8    Expected Discharge Date: 07/29/2025     Concerns to be Addressed: discharge planning     Patient plan of care discussed at interdisciplinary rounds: Yes    Anticipated Discharge Disposition: Transitional Care              Anticipated Discharge Services: None  Anticipated Discharge DME:      Patient/family educated on Medicare website which has current facility and service quality ratings: yes  Education Provided on the Discharge Plan:    Patient/Family in Agreement with the Plan: yes    Referrals Placed by CM/SW: Post Acute Facilities  Private pay costs discussed: transportation costs    Discussed  Partnership in Safe Discharge Planning  document with patient/family: No     Handoff Completed: No, handoff not indicated or clinically appropriate    Additional Information:  CM received message from [facility] that we are just waiting on Insurance auth now.  CM set up tentative ride for [phone], pending that auth.  CM received message from wife, Ana and she agrees to transportation costs and was updated on the above. PAS completed.    Next Steps: Cm to wait for auth .    Addendum 1210  Patient's insurance auth did not come back  yet. Cm called transportation to reschedule transportation to tomorrow with a transport time of [phone]. Patient, NST, Charge RN,RN. TCU and wife updated.    Mercedes Tafoya, RN, BSN, CM  Inpatient Care Coordination  [facility]  [phone]

## 2025-07-30 NOTE — PROVIDER NOTIFICATION
07/30/25 0342   Tech Time   $Tech Time (10 minute increments) 3   Mode: CPAP/ BiPAP/ AVAPS/ AVAPS AE   CPAP/BiPAP/ AVAPS/ AVAPS AE Mode CPAP   Equipment   Device Resmed   CPAP/BiPAP/Settings   BIPAP/CPAP On Standby On   Oxygen (%) 21   CPAP/BiPAP Patient Parameters   CPAP (cm H2O) 14 cmh2o   RT Device Skin Assessment   Oxygen Delivery Device CPAP/BiPAP Mask   Interface Face Mask - Large   Ventilator Arm In Place No   Site Appearance neck circumference Clean and dry   Site Appearance bridge of nose Clean and dry   Site Appearance occiput Clean and dry   Strap Tightness Finger Allowance between head and device strap   Device Skin Interventions Taken No adjustments needed     Robert Andersen RT, RT  7/30/2025 3:42 AM

## 2025-07-31 VITALS
WEIGHT: 315 LBS | HEIGHT: 73 IN | TEMPERATURE: 97 F | DIASTOLIC BLOOD PRESSURE: 69 MMHG | OXYGEN SATURATION: 99 % | SYSTOLIC BLOOD PRESSURE: 113 MMHG | HEART RATE: 81 BPM | BODY MASS INDEX: 41.75 KG/M2 | RESPIRATION RATE: 16 BRPM

## 2025-07-31 PROCEDURE — 94660 CPAP INITIATION&MGMT: CPT

## 2025-07-31 PROCEDURE — 250N000013 HC RX MED GY IP 250 OP 250 PS 637: Performed by: INTERNAL MEDICINE

## 2025-07-31 PROCEDURE — 250N000011 HC RX IP 250 OP 636: Performed by: PHYSICIAN ASSISTANT

## 2025-07-31 PROCEDURE — 99239 HOSP IP/OBS DSCHRG MGMT >30: CPT | Performed by: STUDENT IN AN ORGANIZED HEALTH CARE EDUCATION/TRAINING PROGRAM

## 2025-07-31 PROCEDURE — 999N000157 HC STATISTIC RCP TIME EA 10 MIN

## 2025-07-31 PROCEDURE — 250N000013 HC RX MED GY IP 250 OP 250 PS 637: Performed by: STUDENT IN AN ORGANIZED HEALTH CARE EDUCATION/TRAINING PROGRAM

## 2025-07-31 RX ORDER — METOPROLOL SUCCINATE 25 MG/1
25 TABLET, EXTENDED RELEASE ORAL 2 TIMES DAILY
DISCHARGE
Start: 2025-07-31

## 2025-07-31 RX ADMIN — METHOCARBAMOL 500 MG: 500 TABLET ORAL at 14:32

## 2025-07-31 RX ADMIN — ENOXAPARIN SODIUM 40 MG: 40 INJECTION SUBCUTANEOUS at 14:32

## 2025-07-31 RX ADMIN — ACETAMINOPHEN 1000 MG: 500 TABLET, FILM COATED ORAL at 14:32

## 2025-07-31 RX ADMIN — PANTOPRAZOLE SODIUM 40 MG: 40 TABLET, DELAYED RELEASE ORAL at 09:03

## 2025-07-31 RX ADMIN — FLUOXETINE HYDROCHLORIDE 40 MG: 20 CAPSULE ORAL at 09:03

## 2025-07-31 RX ADMIN — ACETAMINOPHEN 1000 MG: 500 TABLET, FILM COATED ORAL at 06:05

## 2025-07-31 RX ADMIN — METOPROLOL SUCCINATE 25 MG: 25 TABLET, EXTENDED RELEASE ORAL at 09:03

## 2025-07-31 ASSESSMENT — ACTIVITIES OF DAILY LIVING (ADL)
ADLS_ACUITY_SCORE: 50

## 2025-07-31 NOTE — PLAN OF CARE
"Goal Outcome Evaluation:      Plan of Care Reviewed With: patient    Overall Patient Progress: no changeOverall Patient Progress: no change    Outcome Evaluation: A/Ox4, RA, CPAP used overnight, baseline numbness tingling, denies pain, voids via external cath, discharge to TCU today      Problem: Adult Inpatient Plan of Care  Goal: Plan of Care Review  Description: The Plan of Care Review/Shift note should be completed every shift.  The Outcome Evaluation is a brief statement about your assessment that the patient is improving, declining, or no change.  This information will be displayed automatically on your shift  note.  Outcome: Progressing  Flowsheets (Taken 7/31/2025 0048)  Outcome Evaluation: A/Ox4, RA, CPAP used overnight, baseline numbness tingling, denies pain, voids via external cath, discharge to TCU today  Plan of Care Reviewed With: patient  Overall Patient Progress: no change  Goal: Patient-Specific Goal (Individualized)  Description: You can add care plan individualizations to a care plan. Examples of Individualization might be:  \"Parent requests to be called daily at 9am for status\", \"I have a hard time hearing out of my right ear\", or \"Do not touch me to wake me up as it startles  me\".  Outcome: Progressing  Goal: Absence of Hospital-Acquired Illness or Injury  Outcome: Progressing  Intervention: Identify and Manage Fall Risk  Recent Flowsheet Documentation  Taken 7/31/2025 0029 by Altagracia Juarez RN  Safety Promotion/Fall Prevention:   assistive device/personal items within reach   safety round/check completed  Intervention: Prevent Skin Injury  Recent Flowsheet Documentation  Taken 7/31/2025 0029 by Altagracia Juarez RN  Body Position: supine, head elevated  Skin Protection: incontinence pads utilized  Taken 7/31/2025 0027 by Altagracia Juarez RN  Body Position:   supine, head elevated   position changed independently  Intervention: Prevent and Manage VTE (Venous Thromboembolism) Risk  Recent " Flowsheet Documentation  Taken 7/31/2025 0029 by Altagracia Juarez RN  VTE Prevention/Management: patient refused intervention  Intervention: Prevent Infection  Recent Flowsheet Documentation  Taken 7/31/2025 0029 by Altagracia Juarez RN  Infection Prevention:   single patient room provided   rest/sleep promoted  Goal: Optimal Comfort and Wellbeing  Outcome: Progressing  Intervention: Provide Person-Centered Care  Recent Flowsheet Documentation  Taken 7/31/2025 0029 by Altagracia Juarez RN  Trust Relationship/Rapport: care explained  Goal: Readiness for Transition of Care  Outcome: Progressing     Problem: Delirium  Goal: Optimal Coping  Outcome: Progressing  Goal: Improved Behavioral Control  Outcome: Progressing  Intervention: Minimize Safety Risk  Recent Flowsheet Documentation  Taken 7/31/2025 0029 by Altagracia Juarez RN  Enhanced Safety Measures:   assistive devices when indicated   room near unit station  Trust Relationship/Rapport: care explained  Goal: Improved Attention and Thought Clarity  Outcome: Progressing  Goal: Improved Sleep  Outcome: Progressing  Intervention: Promote Sleep  Recent Flowsheet Documentation  Taken 7/31/2025 0029 by Altagracia Juarez RN  Sleep/Rest Enhancement:   awakenings minimized   noise level reduced     Problem: Hip Fracture Surgical Repair  Goal: Optimal Coping with Change in Health Status  Outcome: Progressing  Goal: Absence of Bleeding  Outcome: Progressing  Intervention: Monitor and Manage Bleeding  Recent Flowsheet Documentation  Taken 7/31/2025 0029 by Altagracia Juarez RN  Bleeding Management: dressing monitored  Goal: Effective Bowel Elimination  Outcome: Progressing  Intervention: Enhance Bowel Motility and Elimination  Recent Flowsheet Documentation  Taken 7/31/2025 0029 by Altagracia Juarez RN  Bowel Elimination Promotion: adequate fluid intake promoted  Goal: Cognitive Function Maintained  Outcome: Progressing  Intervention: Maintain Cognitive Function  Recent Flowsheet  Documentation  Taken 7/31/2025 0029 by Altagracia Juarez RN  Sleep/Rest Enhancement:   awakenings minimized   noise level reduced  Goal: Fluid and Electrolyte Balance  Outcome: Progressing  Goal: Absence of Infection Signs and Symptoms  Outcome: Progressing  Goal: Optimal Functional Ability  Outcome: Progressing  Intervention: Protect Joint Integrity  Recent Flowsheet Documentation  Taken 7/31/2025 0029 by Altagracia Juarez RN  Compartment Syndrome Management: active flexion/extension encouraged  Intervention: Promote Optimal Functional Status  Recent Flowsheet Documentation  Taken 7/31/2025 0029 by Altagracia Juarez RN  Self-Care Promotion: independence encouraged  Activity Management: activity adjusted per tolerance  Taken 7/31/2025 0027 by Altagracia Juarez RN  Activity Management: activity adjusted per tolerance  Goal: Anesthesia/Sedation Recovery  Outcome: Progressing  Intervention: Optimize Anesthesia Recovery  Recent Flowsheet Documentation  Taken 7/31/2025 0029 by Altagracia Juarez RN  Safety Promotion/Fall Prevention:   assistive device/personal items within reach   safety round/check completed  Goal: Optimal Pain Control and Function  Outcome: Progressing  Goal: Nausea and Vomiting Relief  Outcome: Progressing  Goal: Effective Urinary Elimination  Outcome: Progressing  Intervention: Monitor and Manage Urinary Retention  Recent Flowsheet Documentation  Taken 7/31/2025 0029 by Altagracia Juarez RN  Urinary Elimination Promotion: catheter patency maintained  Goal: Effective Oxygenation and Ventilation  Outcome: Progressing  Intervention: Optimize Oxygenation and Ventilation  Recent Flowsheet Documentation  Taken 7/31/2025 0029 by Altagracia Juarez RN  Head of Bed (HOB) Positioning: HOB at 30 degrees  Taken 7/31/2025 0027 by Altagracia Juarez RN  Head of Bed (HOB) Positioning: HOB at 30 degrees

## 2025-07-31 NOTE — PROVIDER NOTIFICATION
07/30/25 2210   Tech Time   $Tech Time (10 minute increments) 3   Mode: CPAP/ BiPAP/ AVAPS/ AVAPS AE   CPAP/BiPAP/ AVAPS/ AVAPS AE Mode CPAP   Equipment   Device Resmed   CPAP/BiPAP/Settings   BIPAP/CPAP On Standby On   Oxygen (%) 21   CPAP/BiPAP Patient Parameters   CPAP (cm H2O) 14 cmh2o   CPAP/BiPAP/AVAPS/AVAPS AE Alarms   Humidifier Checked N/A   RT Device Skin Assessment   Oxygen Delivery Device CPAP/BiPAP Mask   Interface Face Mask - Large   Ventilator Arm In Place No   Site Appearance neck circumference Clean and dry   Site Appearance bridge of nose Clean and dry   Site Appearance occiput Clean and dry   Strap Tightness Finger Allowance between head and device strap   Device Skin Interventions Taken No adjustments needed     Robert Andersen RT, RT  7/30/2025 10:21 PM       Provider (A): NAI Landeros

## 2025-07-31 NOTE — PLAN OF CARE
"Goal Outcome Evaluation:      Plan of Care Reviewed With: patient    Overall Patient Progress: improvingOverall Patient Progress: improving    Outcome Evaluation: Discharge TCU - transport tomorrow      2A smiley-steady  A&Ox4  Pain managed with Robaxin  Makes needs known      Problem: Adult Inpatient Plan of Care  Goal: Plan of Care Review  Description: The Plan of Care Review/Shift note should be completed every shift.  The Outcome Evaluation is a brief statement about your assessment that the patient is improving, declining, or no change.  This information will be displayed automatically on your shift  note.  Outcome: Progressing  Flowsheets (Taken 7/30/2025 1902)  Outcome Evaluation: Discharge TCU - transport tomorrow  Plan of Care Reviewed With: patient  Overall Patient Progress: improving  Goal: Patient-Specific Goal (Individualized)  Description: You can add care plan individualizations to a care plan. Examples of Individualization might be:  \"Parent requests to be called daily at 9am for status\", \"I have a hard time hearing out of my right ear\", or \"Do not touch me to wake me up as it startles  me\".  Outcome: Progressing  Goal: Absence of Hospital-Acquired Illness or Injury  Outcome: Progressing  Intervention: Identify and Manage Fall Risk  Recent Flowsheet Documentation  Taken 7/30/2025 0900 by Reyes O'Connor, Bridget M, RN  Safety Promotion/Fall Prevention:   activity supervised   assistive device/personal items within reach   clutter free environment maintained   increased rounding and observation   increase visualization of patient   lighting adjusted   mobility aid in reach   nonskid shoes/slippers when out of bed   patient and family education   safety round/check completed   supervised activity  Intervention: Prevent Skin Injury  Recent Flowsheet Documentation  Taken 7/30/2025 0900 by Reyes O'Connor, Bridget M, RN  Body Position:   position maintained   supine, head elevated   supine, legs " elevated  Intervention: Prevent and Manage VTE (Venous Thromboembolism) Risk  Recent Flowsheet Documentation  Taken 7/30/2025 0900 by Reyes O'Connor, Bridget M, RN  VTE Prevention/Management: SCDs on (sequential compression devices)  Goal: Optimal Comfort and Wellbeing  Outcome: Progressing  Goal: Readiness for Transition of Care  Outcome: Progressing

## 2025-07-31 NOTE — PLAN OF CARE
Goal Outcome Evaluation:      Plan of Care Reviewed With: patient    Overall Patient Progress: improvingOverall Patient Progress: improving    Outcome Evaluation: Discharge TCU - transport tomorrow        Discharge Note    Patient discharged to TCU via transportation service  accompanied by other Cousin.  IV: Discontinued  Prescriptions sent with patient to discharge facility .   Belongings reviewed and sent with patient and family.   Home medications returned to patient: NA  Equipment sent with: patient, N/A.   patient verbalizes understanding of discharge instructions. AVS given to Transport.  Additional education completed? N/A - to TCU

## 2025-07-31 NOTE — PLAN OF CARE
"Goal Outcome Evaluation:      Plan of Care Reviewed With: patient    Overall Patient Progress: no changeOverall Patient Progress: no change    Outcome Evaluation: Discharge to TCU tomorrow        A&O x 4. Regular diet. CPAP overnight. Voiding. Up with sera steady 2A. Tylenol for pain. Dressing CDI. Transport to TCU tomorrow [id number].      Problem: Adult Inpatient Plan of Care  Goal: Plan of Care Review  Description: The Plan of Care Review/Shift note should be completed every shift.  The Outcome Evaluation is a brief statement about your assessment that the patient is improving, declining, or no change.  This information will be displayed automatically on your shift  note.  Outcome: Progressing  Flowsheets (Taken 7/30/2025 2212)  Outcome Evaluation: Discharge to TCU tomorrow  Plan of Care Reviewed With: patient  Overall Patient Progress: no change  Goal: Patient-Specific Goal (Individualized)  Description: You can add care plan individualizations to a care plan. Examples of Individualization might be:  \"Parent requests to be called daily at 9am for status\", \"I have a hard time hearing out of my right ear\", or \"Do not touch me to wake me up as it startles  me\".  Outcome: Progressing  Goal: Absence of Hospital-Acquired Illness or Injury  Outcome: Progressing  Intervention: Identify and Manage Fall Risk  Recent Flowsheet Documentation  Taken 7/30/2025 2042 by Tea Payton RN  Safety Promotion/Fall Prevention:   activity supervised   assistive device/personal items within reach   nonskid shoes/slippers when out of bed   safety round/check completed  Intervention: Prevent Infection  Recent Flowsheet Documentation  Taken 7/30/2025 2042 by Tea Payton RN  Infection Prevention: rest/sleep promoted  Goal: Optimal Comfort and Wellbeing  Outcome: Progressing  Intervention: Provide Person-Centered Care  Recent Flowsheet Documentation  Taken 7/30/2025 2042 by Tea Payton RN  Trust " Relationship/Rapport: care explained  Goal: Readiness for Transition of Care  Outcome: Progressing     Problem: Delirium  Goal: Optimal Coping  Outcome: Progressing  Goal: Improved Behavioral Control  Outcome: Progressing  Intervention: Minimize Safety Risk  Recent Flowsheet Documentation  Taken 7/30/2025 2042 by Tea Payton RN  Enhanced Safety Measures: assistive devices when indicated  Trust Relationship/Rapport: care explained  Goal: Improved Attention and Thought Clarity  Outcome: Progressing  Goal: Improved Sleep  Outcome: Progressing     Problem: Hip Fracture Surgical Repair  Goal: Optimal Coping with Change in Health Status  Outcome: Progressing  Goal: Absence of Bleeding  Outcome: Progressing  Goal: Effective Bowel Elimination  Outcome: Progressing  Goal: Cognitive Function Maintained  Outcome: Progressing  Goal: Fluid and Electrolyte Balance  Outcome: Progressing  Goal: Absence of Infection Signs and Symptoms  Outcome: Progressing  Goal: Optimal Functional Ability  Outcome: Progressing  Intervention: Promote Optimal Functional Status  Recent Flowsheet Documentation  Taken 7/30/2025 2042 by Tea Payton RN  Activity Management: back to bed  Goal: Anesthesia/Sedation Recovery  Outcome: Progressing  Intervention: Optimize Anesthesia Recovery  Recent Flowsheet Documentation  Taken 7/30/2025 2042 by Tea Payton RN  Safety Promotion/Fall Prevention:   activity supervised   assistive device/personal items within reach   nonskid shoes/slippers when out of bed   safety round/check completed  Goal: Optimal Pain Control and Function  Outcome: Progressing  Goal: Nausea and Vomiting Relief  Outcome: Progressing  Goal: Effective Urinary Elimination  Outcome: Progressing  Goal: Effective Oxygenation and Ventilation  Outcome: Progressing

## 2025-07-31 NOTE — PROGRESS NOTES
Care Management Discharge Note    Discharge Date: 07/31/2025       Discharge Disposition: Transitional Care    Discharge Services: None    Discharge DME:      Discharge Transportation: agency    Private pay costs discussed: Not applicable    Does the patient's insurance plan have a 3 day qualifying hospital stay waiver?  No    PAS Confirmation Code: [id number]  Patient/family educated on Medicare website which has current facility and service quality ratings: yes    Education Provided on the Discharge Plan:    Persons Notified of Discharge Plans: NST, patient, Charge RN. TCU  Patient/Family in Agreement with the Plan: yes    Handoff Referral Completed: No, handoff not indicated or clinically appropriate    Additional Information:  Patient discharging to [facility] TCU today via [facility] transport with a window of [phone]. Orders were faxed to [phone].  See above for those informed today.    Addendum [phone]  Orders were re faxed the orders per Hospitalist- made change.  CM left message for [facility] admissions.    Mercedes Tafoya RN, BSN, CM  Inpatient Care Coordination  [facility]  [phone]      Zeynep Tafoya RN

## 2025-07-31 NOTE — PLAN OF CARE
Physical Therapy Discharge Summary    Reason for therapy discharge:    Discharged to transitional care facility.    Progress towards therapy goal(s). See goals on Care Plan in [facility] electronic health record for goal details.  Goals not met.  Barriers to achieving goals:   limited tolerance for therapy and discharge from facility.    Therapy recommendation(s):    Continued therapy is recommended.  Rationale/Recommendations:  TCU to maximize return to PLOF.

## (undated) DEVICE — SUCTION TIP YANKAUER W/O VENT K86

## (undated) DEVICE — PACK VITRECTOMY PQ15VI57E

## (undated) DEVICE — SOL ADH LIQUID BENZOIN SWAB 0.6ML C1544

## (undated) DEVICE — BLADE KNIFE BEAVER MICROSHARP GREEN 377515

## (undated) DEVICE — GOWN XLG DISP 9545

## (undated) DEVICE — SU VICRYL 4-0 PS-2 18" UND J496H

## (undated) DEVICE — LINEN TOWEL PACK X5 5464

## (undated) DEVICE — NDL 18GA 1.5" 305196

## (undated) DEVICE — DRAPE MICROSCOPE DRAPE  EYE DI40001

## (undated) DEVICE — SUCTION CANISTER MEDIVAC LINER 3000ML W/LID 65651-530

## (undated) DEVICE — TAPE SILICONE KIND REMOVAL 1" 2770S-1

## (undated) DEVICE — LINEN ORTHO ACL PACK 5447

## (undated) DEVICE — LINEN HALF SHEET 5512

## (undated) DEVICE — TAPE MICROPORE 2"X1.5YD 1530S-2

## (undated) DEVICE — Device

## (undated) DEVICE — DRSG GAUZE 4X4" TRAY

## (undated) DEVICE — DRAIN JACKSON PRATT RESERVOIR 100ML SU130-1305

## (undated) DEVICE — REAMER SRG LG SCR NS LTX REUSE 1420-0240

## (undated) DEVICE — PACK TOTAL KNEE BOXED LATEX FREE PO15TKFCT

## (undated) DEVICE — TUBING SMOKE EVAC ATTACHMENT E3590

## (undated) DEVICE — PACK MINOR CUSTOM RIDGES SBA32RMRMA

## (undated) DEVICE — SU ETHILON 10-0 CS160-6 12" 9000G

## (undated) DEVICE — ESU ELEC BLADE 2.75" COATED/INSULATED E1455

## (undated) DEVICE — GLOVE PROTEXIS BLUE W/NEU-THERA 8.0  2D73EB80

## (undated) DEVICE — REAMER SHAFT MODIFIED TRINKLE 8X510MM 0227-8510S

## (undated) DEVICE — SU ETHILON 2-0 PS 18" 585H

## (undated) DEVICE — EYE HANDPIECE 23GA VITRECTOMY CENTURION 8065752134

## (undated) DEVICE — SUCTION CANISTER MEDIVAC LINER 1500ML W/LID 65651-515

## (undated) DEVICE — EYE NDL RETROBULBAR 25GA 1.5" 581275

## (undated) DEVICE — EYE CANN IRR 30GA  ANTERIOR CHAMBER 581273

## (undated) DEVICE — EYE SOL BSS 500ML

## (undated) DEVICE — TAPE MICROPORE 1"X1.5YD 1530S-1

## (undated) DEVICE — BNDG ELASTIC 6" DBL LENGTH UNSTERILE 6611-16

## (undated) DEVICE — PREP SCRUB SOL EXIDINE 4% CHG 4OZ 29002-404

## (undated) DEVICE — DRAIN JACKSON PRATT 15FR ROUND SIL LF JP-2229

## (undated) DEVICE — GAMMA REAMER

## (undated) DEVICE — SU VICRYL 8-0 TG160-8 18" J547G

## (undated) DEVICE — SU VICRYL 3-0 SH 27" UND J416H

## (undated) DEVICE — BAG CLEAR TRASH 1.3M 39X33" P4040C

## (undated) DEVICE — DRAPE LAP W/ARMBOARD 29410

## (undated) DEVICE — PACK CATARACT CUSTOM SO DALE SEY32CTFCX

## (undated) DEVICE — SYR 05ML SLIP TIP W/O NDL 309647

## (undated) DEVICE — GLOVE PROTEXIS MICRO 7.0  2D73PM70

## (undated) DEVICE — EYE SHIELD PLASTIC

## (undated) DEVICE — ESU EYE HIGH TEMP 65410-183

## (undated) DEVICE — LINEN FULL SHEET 5511

## (undated) DEVICE — EYE CANN SOFT TIP 25GA FOR VALVED SET 8065149530

## (undated) DEVICE — SYR 50ML LL W/O NDL 309653

## (undated) DEVICE — SU PLAIN 6-0 TG140-8 18" 1735G

## (undated) DEVICE — SYR 05ML SLIP TIP W/O NDL

## (undated) DEVICE — SYR 03ML LL W/O NDL

## (undated) DEVICE — GLOVE PROTEXIS MICRO 8.0  2D73PM80

## (undated) DEVICE — CAST PADDING 6IN COTTON WEBRIL STERILE 2554

## (undated) DEVICE — LINEN TOWEL PACK X10 5473

## (undated) DEVICE — BNDG ABDOMINAL BINDER 9X62-84" 79-89210

## (undated) DEVICE — SU DERMABOND ADVANCED .7ML DNX12

## (undated) DEVICE — GLOVE PROTEXIS MICRO 7.5  2D73PM75

## (undated) DEVICE — SU STRATAFIX PDS PLUS 2 CT-1 SPIRAL L9 IN SXPP2B412

## (undated) DEVICE — DRSG STERI STRIP 1/2X4" R1547

## (undated) DEVICE — SU PDS II 1 CT MONOFIL Z353H

## (undated) DEVICE — TUBING SUCTION 12"X1/4" N612

## (undated) DEVICE — PEN MARKING SKIN FINE 31145942

## (undated) DEVICE — EYE PACK 25GA PLUS CONSTELLATION PPK4253

## (undated) DEVICE — GLOVE PROTEXIS W/NEU-THERA 7.5  2D73TE75

## (undated) DEVICE — SU PDS II 2-0 SH 27" Z317H

## (undated) DEVICE — PREP SKIN SCRUB TRAY 4461A

## (undated) DEVICE — GLOVE PROTEXIS BLUE W/NEU-THERA 7.0  2D73EB70

## (undated) DEVICE — NDL 19GA 1.5" FILTER 305200

## (undated) DEVICE — SU PDS II 1 CTX 36" Z371T

## (undated) DEVICE — DRAIN BLAKE 19FR W/TROCAR SIL

## (undated) DEVICE — DRAPE C-ARMOR 5 SIDED 5523

## (undated) DEVICE — CLEANSER WOUND IRRISEPT 0.05% CHG IRRISEPT-403

## (undated) DEVICE — TUBING SMOKE EVAC 3/8"X10' E3645

## (undated) DEVICE — NDL 22GA 1.5"

## (undated) DEVICE — GLOVE BIOGEL PI SZ 7.5 40875

## (undated) DEVICE — SYR 20ML LL W/O NDL 302830

## (undated) DEVICE — SUTURE VICRYL+ 2-0 36IN CT-1 UND VCP945H

## (undated) DEVICE — ESU PENCIL W/HOLSTER E2350H

## (undated) DEVICE — EYE KNIFE SLIT XSTAR VISITEC 3.0MM 45DEG 373730

## (undated) DEVICE — ESU GROUND PAD ADULT W/CORD E7507

## (undated) DEVICE — SYR 01ML 25GA 5/8" TBC

## (undated) DEVICE — DRAPE X-RAY TUBE 00-901169-01-OEC

## (undated) DEVICE — CATH TRAY FOLEY COUDE 16FR STATLOCK LATEX 304416A

## (undated) DEVICE — GOWN LG DISP 9515

## (undated) DEVICE — GLOVE PROTEXIS MICRO 6.0  2D73PM60

## (undated) DEVICE — BLADE CLIPPER 3M 9670

## (undated) DEVICE — TUBING ANGIOGRAPHY 1200PSI 30"

## (undated) DEVICE — EYE PACK CUSTOM ANTERIOR 30DEG TIP CENTURION PPK6682-04

## (undated) DEVICE — DRAPE C-ARM 60X42" 1013

## (undated) DEVICE — SYR BULB IRRIG 50ML LATEX FREE 0035280

## (undated) DEVICE — TAPE SILICONE KIND REMOVAL 2" 2770S-2

## (undated) DEVICE — GLOVE PROTEXIS MICRO 8.5  2D73PM85

## (undated) DEVICE — EYE SPONGE SPEAR WECK CEL 0008685

## (undated) DEVICE — SUTURE MONOCRYL+ 2-0 CT-1 36" UNDYED MCP945H

## (undated) DEVICE — SUTURE VICRYL+ 0 CT-1 36IN VLT VCP346H

## (undated) DEVICE — EYE FORCEPS TIP ILM DISP 23GA 723.44

## (undated) DEVICE — GLOVE ESTEEM POWDER FREE SMT 6.5  2D72PT65

## (undated) DEVICE — SU VICRYL 2-0 TIE 12X18" J905T

## (undated) DEVICE — EYE PROBE LASER 25GA FLEX RFID 8065751114

## (undated) DEVICE — SPONGE LAP 18X18" X8435

## (undated) RX ORDER — ONDANSETRON 2 MG/ML
INJECTION INTRAMUSCULAR; INTRAVENOUS
Status: DISPENSED
Start: 2017-11-10

## (undated) RX ORDER — DEXAMETHASONE SODIUM PHOSPHATE 4 MG/ML
INJECTION, SOLUTION INTRA-ARTICULAR; INTRALESIONAL; INTRAMUSCULAR; INTRAVENOUS; SOFT TISSUE
Status: DISPENSED
Start: 2017-11-10

## (undated) RX ORDER — FENTANYL CITRATE-0.9 % NACL/PF 10 MCG/ML
PLASTIC BAG, INJECTION (ML) INTRAVENOUS
Status: DISPENSED
Start: 2025-07-23

## (undated) RX ORDER — CEFAZOLIN SODIUM/WATER 3 G/30 ML
SYRINGE (ML) INTRAVENOUS
Status: DISPENSED
Start: 2025-07-23

## (undated) RX ORDER — ONDANSETRON 2 MG/ML
INJECTION INTRAMUSCULAR; INTRAVENOUS
Status: DISPENSED
Start: 2017-05-12

## (undated) RX ORDER — ONDANSETRON 2 MG/ML
INJECTION INTRAMUSCULAR; INTRAVENOUS
Status: DISPENSED
Start: 2025-07-23

## (undated) RX ORDER — BUPIVACAINE HYDROCHLORIDE AND EPINEPHRINE 2.5; 5 MG/ML; UG/ML
INJECTION, SOLUTION EPIDURAL; INFILTRATION; INTRACAUDAL; PERINEURAL
Status: DISPENSED
Start: 2017-11-10

## (undated) RX ORDER — NEOSTIGMINE METHYLSULFATE 1 MG/ML
VIAL (ML) INJECTION
Status: DISPENSED
Start: 2017-11-10

## (undated) RX ORDER — GLYCOPYRROLATE 0.2 MG/ML
INJECTION INTRAMUSCULAR; INTRAVENOUS
Status: DISPENSED
Start: 2017-11-10

## (undated) RX ORDER — LIDOCAINE HYDROCHLORIDE 10 MG/ML
INJECTION, SOLUTION EPIDURAL; INFILTRATION; INTRACAUDAL; PERINEURAL
Status: DISPENSED
Start: 2025-07-23

## (undated) RX ORDER — FENTANYL CITRATE 50 UG/ML
INJECTION, SOLUTION INTRAMUSCULAR; INTRAVENOUS
Status: DISPENSED
Start: 2025-07-23

## (undated) RX ORDER — DEXAMETHASONE SODIUM PHOSPHATE 4 MG/ML
INJECTION, SOLUTION INTRA-ARTICULAR; INTRALESIONAL; INTRAMUSCULAR; INTRAVENOUS; SOFT TISSUE
Status: DISPENSED
Start: 2025-07-23

## (undated) RX ORDER — GLYCOPYRROLATE 0.2 MG/ML
INJECTION, SOLUTION INTRAMUSCULAR; INTRAVENOUS
Status: DISPENSED
Start: 2025-07-23

## (undated) RX ORDER — VANCOMYCIN HYDROCHLORIDE 1 G/20ML
INJECTION, POWDER, LYOPHILIZED, FOR SOLUTION INTRAVENOUS
Status: DISPENSED
Start: 2025-07-23

## (undated) RX ORDER — PROPOFOL 10 MG/ML
INJECTION, EMULSION INTRAVENOUS
Status: DISPENSED
Start: 2025-07-23

## (undated) RX ORDER — ONDANSETRON 2 MG/ML
INJECTION INTRAMUSCULAR; INTRAVENOUS
Status: DISPENSED
Start: 2017-08-14

## (undated) RX ORDER — TRANEXAMIC ACID 10 MG/ML
INJECTION, SOLUTION INTRAVENOUS
Status: DISPENSED
Start: 2025-07-23

## (undated) RX ORDER — FENTANYL CITRATE 50 UG/ML
INJECTION, SOLUTION INTRAMUSCULAR; INTRAVENOUS
Status: DISPENSED
Start: 2017-11-10

## (undated) RX ORDER — FENTANYL CITRATE 50 UG/ML
INJECTION, SOLUTION INTRAMUSCULAR; INTRAVENOUS
Status: DISPENSED
Start: 2017-05-12

## (undated) RX ORDER — LIDOCAINE HYDROCHLORIDE 10 MG/ML
INJECTION, SOLUTION EPIDURAL; INFILTRATION; INTRACAUDAL; PERINEURAL
Status: DISPENSED
Start: 2017-11-10

## (undated) RX ORDER — EPHEDRINE SULFATE 50 MG/ML
INJECTION, SOLUTION INTRAMUSCULAR; INTRAVENOUS; SUBCUTANEOUS
Status: DISPENSED
Start: 2025-07-23

## (undated) RX ORDER — BUPIVACAINE HYDROCHLORIDE AND EPINEPHRINE 5; 5 MG/ML; UG/ML
INJECTION, SOLUTION EPIDURAL; INTRACAUDAL; PERINEURAL
Status: DISPENSED
Start: 2025-07-23

## (undated) RX ORDER — PROPOFOL 10 MG/ML
INJECTION, EMULSION INTRAVENOUS
Status: DISPENSED
Start: 2017-11-10

## (undated) RX ORDER — BUPIVACAINE HYDROCHLORIDE 2.5 MG/ML
INJECTION, SOLUTION EPIDURAL; INFILTRATION; INTRACAUDAL; PERINEURAL
Status: DISPENSED
Start: 2025-07-23

## (undated) RX ORDER — FENTANYL CITRATE 50 UG/ML
INJECTION, SOLUTION INTRAMUSCULAR; INTRAVENOUS
Status: DISPENSED
Start: 2017-08-14

## (undated) RX ORDER — PROPOFOL 10 MG/ML
INJECTION, EMULSION INTRAVENOUS
Status: DISPENSED
Start: 2017-05-12